# Patient Record
Sex: FEMALE | Race: WHITE | NOT HISPANIC OR LATINO | Employment: OTHER | URBAN - METROPOLITAN AREA
[De-identification: names, ages, dates, MRNs, and addresses within clinical notes are randomized per-mention and may not be internally consistent; named-entity substitution may affect disease eponyms.]

---

## 2017-12-04 ENCOUNTER — OFFICE VISIT (OUTPATIENT)
Dept: MEDICAL GROUP | Facility: MEDICAL CENTER | Age: 62
End: 2017-12-04
Attending: NURSE PRACTITIONER
Payer: MEDICAID

## 2017-12-04 VITALS
TEMPERATURE: 98.3 F | HEART RATE: 68 BPM | SYSTOLIC BLOOD PRESSURE: 102 MMHG | BODY MASS INDEX: 20.83 KG/M2 | DIASTOLIC BLOOD PRESSURE: 60 MMHG | RESPIRATION RATE: 16 BRPM | WEIGHT: 125 LBS | OXYGEN SATURATION: 97 % | HEIGHT: 65 IN

## 2017-12-04 DIAGNOSIS — Z13.1 SCREENING FOR DIABETES MELLITUS: ICD-10-CM

## 2017-12-04 DIAGNOSIS — G40.909 SEIZURE DISORDER (HCC): ICD-10-CM

## 2017-12-04 DIAGNOSIS — Z13.220 SCREENING CHOLESTEROL LEVEL: ICD-10-CM

## 2017-12-04 DIAGNOSIS — E03.9 ACQUIRED HYPOTHYROIDISM: ICD-10-CM

## 2017-12-04 DIAGNOSIS — Z12.39 SCREENING FOR BREAST CANCER: ICD-10-CM

## 2017-12-04 DIAGNOSIS — Z12.11 SCREEN FOR COLON CANCER: ICD-10-CM

## 2017-12-04 DIAGNOSIS — G25.81 RESTLESS LEG SYNDROME: ICD-10-CM

## 2017-12-04 DIAGNOSIS — Z13.21 ENCOUNTER FOR VITAMIN DEFICIENCY SCREENING: ICD-10-CM

## 2017-12-04 DIAGNOSIS — Z13.0 SCREENING, IRON DEFICIENCY ANEMIA: ICD-10-CM

## 2017-12-04 DIAGNOSIS — M19.90 ARTHRITIS: ICD-10-CM

## 2017-12-04 DIAGNOSIS — Z00.00 ROUTINE HEALTH MAINTENANCE: ICD-10-CM

## 2017-12-04 DIAGNOSIS — Z13.29 SCREENING FOR THYROID DISORDER: ICD-10-CM

## 2017-12-04 PROCEDURE — 99204 OFFICE O/P NEW MOD 45 MIN: CPT | Performed by: NURSE PRACTITIONER

## 2017-12-04 PROCEDURE — 99203 OFFICE O/P NEW LOW 30 MIN: CPT | Performed by: NURSE PRACTITIONER

## 2017-12-04 RX ORDER — LEVOTHYROXINE SODIUM 0.05 MG/1
50 TABLET ORAL
COMMUNITY
End: 2017-12-04 | Stop reason: SDUPTHER

## 2017-12-04 RX ORDER — BUMETANIDE 1 MG/1
1 TABLET ORAL DAILY
COMMUNITY
End: 2017-12-04 | Stop reason: SDUPTHER

## 2017-12-04 RX ORDER — BUMETANIDE 1 MG/1
1 TABLET ORAL DAILY
Qty: 30 TAB | Refills: 2 | Status: SHIPPED | OUTPATIENT
Start: 2017-12-04 | End: 2023-01-01

## 2017-12-04 RX ORDER — CLONAZEPAM 1 MG/1
1 TABLET ORAL 2 TIMES DAILY
Qty: 30 TAB | Refills: 0 | Status: SHIPPED | OUTPATIENT
Start: 2017-12-04 | End: 2018-05-09

## 2017-12-04 RX ORDER — LEVOTHYROXINE SODIUM 0.05 MG/1
50 TABLET ORAL
Qty: 30 TAB | Refills: 2 | Status: SHIPPED | OUTPATIENT
Start: 2017-12-04 | End: 2018-01-02 | Stop reason: SDUPTHER

## 2017-12-04 RX ORDER — PRAMIPEXOLE DIHYDROCHLORIDE 1 MG/1
1 TABLET ORAL 2 TIMES DAILY
COMMUNITY
End: 2017-12-04 | Stop reason: SDUPTHER

## 2017-12-04 RX ORDER — CLONAZEPAM 1 MG/1
0.5 TABLET ORAL
COMMUNITY
End: 2018-05-09

## 2017-12-04 RX ORDER — IBUPROFEN 600 MG/1
600 TABLET ORAL EVERY 6 HOURS PRN
COMMUNITY
End: 2017-12-04 | Stop reason: SDUPTHER

## 2017-12-04 RX ORDER — PRAMIPEXOLE DIHYDROCHLORIDE 1 MG/1
1 TABLET ORAL 2 TIMES DAILY
Qty: 60 TAB | Refills: 2 | Status: SHIPPED | OUTPATIENT
Start: 2017-12-04 | End: 2018-03-02 | Stop reason: SDUPTHER

## 2017-12-04 RX ORDER — LEVETIRACETAM 500 MG/1
500 TABLET ORAL 2 TIMES DAILY
COMMUNITY
End: 2017-12-04 | Stop reason: SDUPTHER

## 2017-12-04 RX ORDER — LEVETIRACETAM 500 MG/1
500 TABLET ORAL 2 TIMES DAILY
Qty: 60 TAB | Refills: 2 | Status: SHIPPED | OUTPATIENT
Start: 2017-12-04 | End: 2018-03-02 | Stop reason: SDUPTHER

## 2017-12-04 RX ORDER — IBUPROFEN 600 MG/1
600 TABLET ORAL EVERY 6 HOURS PRN
Qty: 30 TAB | Refills: 2 | Status: SHIPPED | OUTPATIENT
Start: 2017-12-04 | End: 2019-03-11

## 2017-12-04 ASSESSMENT — PATIENT HEALTH QUESTIONNAIRE - PHQ9: CLINICAL INTERPRETATION OF PHQ2 SCORE: 0

## 2017-12-05 NOTE — ASSESSMENT & PLAN NOTE
"Hx of RLS 2008.  States legs move a lot in bed at night.  Pt reports over time \"like electricity going down the legs\"  Taking Klonopin at night and Mirapex at night.  Controls the RLS she reports.  We discussed that I would prefer to wean her off the Clonazepam as she   Also uses marijuana frequently and have discussed need for   Controlled Substance agreement and UDS at next visit  If Pt is to continue Clonazepam at hs.  Pt understands.  "

## 2017-12-05 NOTE — ASSESSMENT & PLAN NOTE
Partial thyroidectomy due to nodule on thyroid.  Benign thyroid nodule removed 1994.  Has been our of her Synthroid 50 mcg/day for about 1 week.  States feels slightly less energy but otherwise okay.  We discussed re-starting and getting labs a few days before next appt  In ~ 4 weeks, Pt agrees.

## 2017-12-05 NOTE — ASSESSMENT & PLAN NOTE
Pt reportst Aug 2013 in [place]. Had Sz with noknown hx.  States Petit mal Seizures. Started on Keppra in 2013.  Unsure if had Sz in middle of night at one time but other than that not sure.  Denies known head trauma or drug use except Marijuana.

## 2017-12-05 NOTE — PROGRESS NOTES
"    Chief Complaint: New Patient, Med Refills    HPI:  Malaika presents to the clinic as New Patient.    Moved from [facility] to [place] recently    Her PMH includes  Arthritis  Hypothyroid  Partial Thyroidectomy  Seizure Disorder as adult  Restless Leg Syndrome (RLS)  Tobacco use  Marijuana use    [place]  Report   No Results    Arthritis  Pt reports 1992 joint pains.   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  States her hands have nodules  Worried she may have rheumatoid arthritis.    Acquired hypothyroidism  Partial thyroidectomy due to nodule on thyroid.  Benign thyroid nodule removed 1994.  Has been our of her Synthroid 50 mcg/day for about 1 week.  States feels slightly less energy but otherwise okay.  We discussed re-starting and getting labs a few days before next appt  In ~ 4 weeks, Pt agrees.    Seizure disorder (CMS-HCC)  Pt reportst Aug 2013 in [place]. Had Sz with noknown hx.  States Petit mal Seizures. Started on Keppra in 2013.  Unsure if had Sz in middle of night at one time but other than that not sure.  Denies known head trauma or drug use except Marijuana.    Restless leg syndrome  Hx of RLS 2008.  States legs move a lot in bed at night.  Pt reports over time \"like electricity going down the legs\"  Taking Klonopin at night and Mirapex at night.  Controls the RLS she reports.  We discussed that I would prefer to wean her off the Clonazepam as she   Also uses marijuana frequently and have discussed need for   Controlled Substance agreement and UDS at next visit  If Pt is to continue Clonazepam at hs.  Pt understands.      Patient Active Problem List    Diagnosis Date Noted   • Seizure disorder (CMS-HCC) 12/04/2017   • Acquired hypothyroidism 12/04/2017   • Restless leg syndrome 12/04/2017   • Arthritis 12/04/2017       Allergies:Sulfa drugs    Current Outpatient Prescriptions   Medication Sig Dispense Refill   • clonazepam (KLONOPIN) 1 MG Tab Take 0.5 mg by mouth every bedtime.     • pramipexole (MIRAPEX) 1 MG Tab " "Take 1 Tab by mouth 2 Times a Day. 60 Tab 2   • levetiracetam (KEPPRA) 500 MG Tab Take 1 Tab by mouth 2 times a day. 60 Tab 2   • bumetanide (BUMEX) 1 MG Tab Take 1 Tab by mouth every day. 30 Tab 2   • levothyroxine (SYNTHROID) 50 MCG Tab Take 1 Tab by mouth Every morning on an empty stomach. 30 Tab 2   • ibuprofen (MOTRIN) 600 MG Tab Take 1 Tab by mouth every 6 hours as needed. 30 Tab 2   • clonazepam (KLONOPIN) 1 MG Tab Take 1 Tab by mouth 2 times a day. 30 Tab 0     No current facility-administered medications for this visit.        Social History   Substance Use Topics   • Smoking status: Current Every Day Smoker     Packs/day: 0.25     Years: 15.00     Types: Cigarettes   • Smokeless tobacco: Never Used   • Alcohol use Yes      Comment: about drinks, wine or mixed drink        Family History   Problem Relation Age of Onset   • Stroke Mother    • Cancer Mother    • Cancer Maternal Aunt    • Stroke Maternal Grandmother    • Cancer Paternal Grandfather        ROS:  Review of Systems   See HPI Above    Exam:  Blood pressure 102/60, pulse 68, temperature 36.8 °C (98.3 °F), resp. rate 16, height 1.638 m (5' 4.5\"), weight 56.7 kg (125 lb), SpO2 97 %.  General:  Well nourished, well developed female in NAD  HENT:Head is grossly normal. PERRL.  Neck: Supple. Trachea is midline.  Pulmonary: Clear to ausculation .  Normal effort. No rales, ronchi, or wheezing.   Cardiovascular: Regular rate and rhythm.  Abdomen-Abdomen is soft, No tenderness.  Upper extremities- WEAK = . Good ROM, fingers with some joint deformity/enlargement.  Lower extremities- neg for edema, redness, tenderness.  Neuro- A & O x 4. Speech clear and appropriate.     Current medications, allergies, and problem list reviewed with patient and updated in  [facility] today.    Assessment/Plan:  1. Screening, iron deficiency anemia  CBC WITH DIFFERENTIAL    IRON/TOTAL IRON BIND    FERRITIN   2. Screening for diabetes mellitus  COMP METABOLIC PANEL    HEMOGLOBIN " A1C   3. Screening for thyroid disorder  TSH   4. Encounter for vitamin deficiency screening  VITAMIN D,25 HYDROXY    VITAMIN B12   5. Screening cholesterol level  LIPID PROFILE   6. Screen for colon cancer  OCCULT BLOOD FECES IMMUNOASSAY (FIT)   7. Screening for breast cancer  MA-SCREEN MAMMO W/CAD-BILAT   8. Seizure disorder (CMS-HCC)  LEVETIRACETAM (KEPPRA), S    REFERRAL TO NEUROLOGY    levetiracetam (KEPPRA) 500 MG Tab   9. Acquired hypothyroidism  levothyroxine (SYNTHROID) 50 MCG Tab   10. Restless leg syndrome  pramipexole (MIRAPEX) 1 MG Tab    bumetanide (BUMEX) 1 MG Tab    clonazepam (KLONOPIN) 1 MG Tab at hs  If need refill next month Pt will need to give Urine and provide Urine for UDS. Pt instructed about over use of alcohol or marijuana not allowed on our agreement nor is it safe with this medication.   11. Arthritis  RHEUMATOID ARTHRITIS FACTOR    ibuprofen (MOTRIN) 600 MG Tab   12. Routine health maintenance  HEPATITIS PANEL ACUTE(4 COMPONENTS)    HIV ANTIBODIES   Follow up in 4 weeks. Call or return if questions, concerns, or worsening condition.

## 2017-12-05 NOTE — ASSESSMENT & PLAN NOTE
Pt reports 1992 joint pains.   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  States her hands have nodules  Worried she may have rheumatoid arthritis.

## 2017-12-22 LAB — HBA1C MFR BLD: 5.2 % (ref ?–5.8)

## 2018-01-02 ENCOUNTER — OFFICE VISIT (OUTPATIENT)
Dept: MEDICAL GROUP | Facility: MEDICAL CENTER | Age: 63
End: 2018-01-02
Attending: NURSE PRACTITIONER
Payer: MEDICAID

## 2018-01-02 VITALS
WEIGHT: 131.8 LBS | BODY MASS INDEX: 21.18 KG/M2 | HEART RATE: 88 BPM | OXYGEN SATURATION: 98 % | HEIGHT: 66 IN | SYSTOLIC BLOOD PRESSURE: 121 MMHG | DIASTOLIC BLOOD PRESSURE: 61 MMHG | RESPIRATION RATE: 17 BRPM | TEMPERATURE: 98.5 F

## 2018-01-02 DIAGNOSIS — G25.81 RESTLESS LEG SYNDROME: ICD-10-CM

## 2018-01-02 DIAGNOSIS — E03.9 ACQUIRED HYPOTHYROIDISM: ICD-10-CM

## 2018-01-02 DIAGNOSIS — G40.909 SEIZURE DISORDER (HCC): ICD-10-CM

## 2018-01-02 DIAGNOSIS — G89.4 CHRONIC PAIN SYNDROME: ICD-10-CM

## 2018-01-02 DIAGNOSIS — M19.90 ARTHRITIS: ICD-10-CM

## 2018-01-02 DIAGNOSIS — R05.9 COUGH: ICD-10-CM

## 2018-01-02 PROCEDURE — 99213 OFFICE O/P EST LOW 20 MIN: CPT | Performed by: NURSE PRACTITIONER

## 2018-01-02 PROCEDURE — 99214 OFFICE O/P EST MOD 30 MIN: CPT | Performed by: NURSE PRACTITIONER

## 2018-01-02 RX ORDER — BENZONATATE 100 MG/1
100 CAPSULE ORAL 3 TIMES DAILY PRN
Qty: 30 CAP | Refills: 0 | Status: SHIPPED | OUTPATIENT
Start: 2018-01-02 | End: 2018-05-09

## 2018-01-02 RX ORDER — CODEINE PHOSPHATE/GUAIFENESIN 10-100MG/5
5 LIQUID (ML) ORAL 3 TIMES DAILY PRN
Qty: 120 ML | Refills: 0 | Status: SHIPPED | OUTPATIENT
Start: 2018-01-02 | End: 2018-01-09

## 2018-01-02 RX ORDER — CYCLOBENZAPRINE HCL 10 MG
10 TABLET ORAL NIGHTLY PRN
Qty: 30 TAB | Refills: 1 | Status: SHIPPED | OUTPATIENT
Start: 2018-01-02 | End: 2018-05-09

## 2018-01-02 RX ORDER — LEVOTHYROXINE SODIUM 0.05 MG/1
50 TABLET ORAL
Qty: 30 TAB | Refills: 2 | Status: SHIPPED | OUTPATIENT
Start: 2018-01-02 | End: 2018-06-22 | Stop reason: SDUPTHER

## 2018-01-02 RX ORDER — GABAPENTIN 100 MG/1
100 CAPSULE ORAL 3 TIMES DAILY
Qty: 90 CAP | Refills: 2 | Status: SHIPPED | OUTPATIENT
Start: 2018-01-02 | End: 2018-04-02 | Stop reason: SDUPTHER

## 2018-01-02 RX ORDER — CLONAZEPAM 0.5 MG/1
0.5 TABLET ORAL NIGHTLY PRN
Qty: 30 TAB | Refills: 0 | Status: SHIPPED | OUTPATIENT
Start: 2018-01-02 | End: 2018-02-01

## 2018-01-02 NOTE — ASSESSMENT & PLAN NOTE
Pt reports has been doing well but Mirapex is helping.   States has been on Klonopin 1 mg at hs for years.  I discussed tapering her off Klonopin and trying addition of Muscle Relaxant at hs.  Is not iron deficient per labs.  Also if needs ongoing Klonopin at next visit I instructed her we would for sure  Need to do Controlled Substance Agreement and obtain Urine for UDS prior to  Any more Klonopin RX's

## 2018-01-02 NOTE — ASSESSMENT & PLAN NOTE
We discussed her slightly low Keppra level.   Pt denies any recent Sz's. She reports that she did get a call  From neurology and was told Dr Kirkland would review her chart and they would call in early Jan 2018 to schedule an appt.

## 2018-01-02 NOTE — PROGRESS NOTES
"Malaika presents to the clinic for cough, body aches, nasal congestion, diarrhea x 1 week, and results    Her PMH includes:    Arthritis  Hypothyroid  Partial Thyroidectomy  Seizure Disorder as adult  Restless Leg Syndrome (RLS)  Tobacco use  Marijuana use     [place]  Report   Clonazepam 1 mg # 30 prescribed by me.      Review of Records shows:  12/4/17 Clinic New Patient appt and Med Refills. Labs ordered, Refer to Neurology for Seizures and Restless legs.  RX for Clonazepam for restless legs along with Requip.    Results Review:  12/22/17 [facility]- CBC normal, CMp normal except BS= 107, BUN= 30 GFR normal.                                      TSh= 0.50, Iron Levels okay, Keppra level = 2.1, Vit D= 60, Vit B12 = 1,319.                                       Hepatitis and HIV tests all negative.    Chief Complaint:    HPI:      Cough  Cough, body aches, nasal congestion and diarrhea for about a week.  States coughing up clear phlegm. Did have some chills.   States diarrhea is better. Body aches better but tired now.  Denies SOB now.     Acquired hypothyroidism  WE discussed her TSH and is taking 50 mcg/day.  Denies palpitations or wt loss or gain.    Restless leg syndrome  Pt reports has been doing well but Mirapex is helping.   States has been on Klonopin 1 mg at hs for years.  I discussed tapering her off Klonopin and trying addition of Muscle Relaxant at hs.  Is not iron deficient per labs.  Also if needs ongoing Klonopin at next visit I instructed her we would for sure  Need to do Controlled Substance Agreement and obtain Urine for UDS prior to  Any more Klonopin RX's      Chronic pain syndrome/\"Arthritis\"  Pt reports has chronic pain in hands, feet, hips and lower back.  We discussed that her Rheumatoid Arthritis test was negative.  Is interested in Pain Management  Is taking Motrin and helps slightly. I instructed her that I did not believe narcotics   Would be appropriate for her chronic pain and recommended "   Motrin and Gabapentin, but will refer to pain management.  IN past has used Gabapentin and it helped in past especially w legs cramps.    Seizure disorder (CMS-HCC)  We discussed her slightly low Keppra level.   Pt denies any recent Sz's. She reports that she did get a call  From neurology and was told Dr Kirkland would review her chart and they would call in early Jan 2018 to schedule an appt.        Patient Active Problem List    Diagnosis Date Noted   • Cough 01/02/2018   • Seizure disorder (CMS-HCC) 12/04/2017   • Acquired hypothyroidism 12/04/2017   • Restless leg syndrome 12/04/2017   • Chronic pain syndrome 12/04/2017       Allergies:Sulfa drugs    Current Outpatient Prescriptions   Medication Sig Dispense Refill   • benzonatate (TESSALON) 100 MG Cap Take 1 Cap by mouth 3 times a day as needed. 30 Cap 0   • guaifenesin-codeine (TUSSI-ORGANIDIN NR) 100-10 MG/5ML syrup Take 5 mL by mouth 3 times a day as needed for up to 7 days. 120 mL 0   • levothyroxine (SYNTHROID) 50 MCG Tab Take 1 Tab by mouth Every morning on an empty stomach. 30 Tab 2   • cyclobenzaprine (FLEXERIL) 10 MG Tab Take 1 Tab by mouth at bedtime as needed (to prevent restless legs). 30 Tab 1   • clonazepam (KLONOPIN) 0.5 MG Tab Take 1 Tab by mouth at bedtime as needed for up to 30 days. 30 Tab 0   • gabapentin (NEURONTIN) 100 MG Cap Take 1 Cap by mouth 3 times a day. 90 Cap 2   • clonazepam (KLONOPIN) 1 MG Tab Take 0.5 mg by mouth every bedtime.     • pramipexole (MIRAPEX) 1 MG Tab Take 1 Tab by mouth 2 Times a Day. 60 Tab 2   • levetiracetam (KEPPRA) 500 MG Tab Take 1 Tab by mouth 2 times a day. 60 Tab 2   • bumetanide (BUMEX) 1 MG Tab Take 1 Tab by mouth every day. 30 Tab 2   • ibuprofen (MOTRIN) 600 MG Tab Take 1 Tab by mouth every 6 hours as needed. 30 Tab 2   • clonazepam (KLONOPIN) 1 MG Tab Take 1 Tab by mouth 2 times a day. 30 Tab 0     No current facility-administered medications for this visit.        Social History   Substance Use  "Topics   • Smoking status: Current Every Day Smoker     Packs/day: 0.25     Years: 15.00     Types: Cigarettes   • Smokeless tobacco: Never Used   • Alcohol use Yes      Comment: about drinks, wine or mixed drink        Family History   Problem Relation Age of Onset   • Stroke Mother    • Cancer Mother    • Cancer Maternal Aunt    • Stroke Maternal Grandmother    • Cancer Paternal Grandfather        ROS:  Review of Systems   See HPI Above        Exam:  Blood pressure 121/61, pulse 88, temperature 36.9 °C (98.5 °F), resp. rate 17, height 1.676 m (5' 6\"), weight 59.8 kg (131 lb 12.8 oz), SpO2 98 %.  General:  Well nourished, well developed female in NAD  HENT:Head is grossly normal. PERRL.  Neck: Supple. Trachea is midline.  Pulmonary: Clear to ausculation .  Normal effort. No rales, ronchi, or wheezing.   Cardiovascular: Regular rate and rhythm.  Abdomen-Abdomen is soft, No tenderness.  Upper extremities- Strong = . Good ROM  Lower extremities- neg for edema, redness, tenderness.  Neuro- A & O x 4. Speech clear and appropriate.     Current medications, allergies, and problem list reviewed with patient and updated in  [facility] today.    Assessment/Plan:  1. Cough  benzonatate (TESSALON) 100 MG Cap    guaifenesin-codeine (TUSSI-ORGANIDIN NR) 100-10 MG/5ML syrup   2. Acquired hypothyroidism  levothyroxine (SYNTHROID) 50 MCG Tab-refill   3. Restless leg syndrome  cyclobenzaprine (FLEXERIL) 10 MG Tab at hs.  Continue Mirapex    clonazepam (KLONOPIN) 0.5 MG Tab-Reduction in dose to taper off.  If not able, then at next visit will need UDS and To agree and Sign Controlled Substance Agreement.   4. Arthritis  REFERRAL TO PAIN CLINIC    gabapentin (NEURONTIN) 100 MG Cap-START   5. Chronic pain syndrome  Refer To Pain Clinic, Gabapentin, Motrin   6. Seizure disorder (CMS-HCC)  Continue [facility].   Follow up in 1 month. Call or return if questions, concerns, or worsening condition.      "

## 2018-01-02 NOTE — ASSESSMENT & PLAN NOTE
Pt reports has chronic pain in hands, feet, hips and lower back.  We discussed that her Rheumatoid Arthritis test was negative.  Is interested in Pain Management  Is taking Motrin and helps slightly. I instructed her that I did not believe narcotics   Would be appropriate for her chronic pain and recommended   Motrin and Gabapentin, but will refer to pain management.  IN past has used Gabapentin and it helped in past especially w legs cramps.

## 2018-01-02 NOTE — ASSESSMENT & PLAN NOTE
Cough, body aches, nasal congestion and diarrhea for about a week.  States coughing up clear phlegm. Did have some chills.   States diarrhea is better. Body aches better but tired now.  Denies SOB now.

## 2018-01-03 ENCOUNTER — HOSPITAL ENCOUNTER (OUTPATIENT)
Dept: RADIOLOGY | Facility: MEDICAL CENTER | Age: 63
End: 2018-01-03
Attending: NURSE PRACTITIONER
Payer: MEDICAID

## 2018-01-03 DIAGNOSIS — Z12.39 SCREENING FOR BREAST CANCER: ICD-10-CM

## 2018-01-03 PROCEDURE — 77067 SCR MAMMO BI INCL CAD: CPT

## 2018-02-06 ENCOUNTER — OFFICE VISIT (OUTPATIENT)
Dept: MEDICAL GROUP | Facility: MEDICAL CENTER | Age: 63
End: 2018-02-06
Attending: NURSE PRACTITIONER
Payer: MEDICARE

## 2018-02-06 VITALS
OXYGEN SATURATION: 98 % | HEIGHT: 66 IN | RESPIRATION RATE: 16 BRPM | BODY MASS INDEX: 20.73 KG/M2 | WEIGHT: 129 LBS | DIASTOLIC BLOOD PRESSURE: 70 MMHG | HEART RATE: 80 BPM | TEMPERATURE: 97.8 F | SYSTOLIC BLOOD PRESSURE: 120 MMHG

## 2018-02-06 DIAGNOSIS — Z12.11 SCREEN FOR COLON CANCER: ICD-10-CM

## 2018-02-06 DIAGNOSIS — G89.4 CHRONIC PAIN SYNDROME: ICD-10-CM

## 2018-02-06 DIAGNOSIS — G47.9 SLEEP DIFFICULTIES: ICD-10-CM

## 2018-02-06 DIAGNOSIS — G25.81 RESTLESS LEG SYNDROME: ICD-10-CM

## 2018-02-06 DIAGNOSIS — M79.674 PAIN OF TOE OF RIGHT FOOT: ICD-10-CM

## 2018-02-06 DIAGNOSIS — E03.9 ACQUIRED HYPOTHYROIDISM: ICD-10-CM

## 2018-02-06 PROCEDURE — 99213 OFFICE O/P EST LOW 20 MIN: CPT | Performed by: NURSE PRACTITIONER

## 2018-02-06 PROCEDURE — 99214 OFFICE O/P EST MOD 30 MIN: CPT | Performed by: NURSE PRACTITIONER

## 2018-02-06 RX ORDER — TRAZODONE HYDROCHLORIDE 50 MG/1
50 TABLET ORAL NIGHTLY PRN
Qty: 30 TAB | Refills: 1 | Status: SHIPPED | OUTPATIENT
Start: 2018-02-06 | End: 2018-06-22 | Stop reason: SDUPTHER

## 2018-02-06 RX ORDER — HYDROXYZINE HYDROCHLORIDE 25 MG/1
25 TABLET, FILM COATED ORAL NIGHTLY PRN
Qty: 30 TAB | Refills: 1 | Status: SHIPPED | OUTPATIENT
Start: 2018-02-06 | End: 2018-11-27

## 2018-02-06 NOTE — ASSESSMENT & PLAN NOTE
Hx of chronic pain to hands, fee, hips and lower back, Neg Rh Factor blood test.  Previously referred on 1/2/18 to Pain Management, but on hold to process as requires   MRI Imaging prior to approval of Referral.   Pt reports

## 2018-02-06 NOTE — PROGRESS NOTES
Malaika presents to the clinic for f/u on Results, Chronic Pain, REstless legs    Her PMH includes:  Arthritis  Chronic Pain ( Hands, Feet, Low Back, Hips)  Hypothyroid  Partial Thyroidectomy  Seizure Disorder as adult  Restless Leg Syndrome (RLS)  Tobacco use  Marijuana use     [place]  Report   1/10/18 Robitussin w Codeine cough syrup 120 cc by me  1/2/18 Clonazepam 0.5 mg # 30 by me  12/4/17 Clonazepam 1 mg # 30 prescribed by me.       Referrals Approved:  Neurology- Dr Kirkland  Pain Management pending MRI, as will not process without this imaging.     Review of Records shows:  1/2/18 Clinc Appt for cough, Body aches, RX Tessalon, Robitussin w codeine, Klonopin and to taper down and off.  Refer to Pain Clinic, To start RX Gabapentin. To Call Neurology for appt r/t Seizure.  12/4/17 Clinic New Patient appt and Med Refills. Labs ordered, Refer to Neurology for Seizures and Restless legs.  RX for Clonazepam for restless legs along with Requip.     Results Review:  12/22/17 [facility]- CBC normal, CMp normal except BS= 107, BUN= 30 GFR normal.                                      TSh= 0.50, Iron Levels okay, Keppra level = 2.1, Vit D= 60, Vit B12 = 1,319.                                       Hepatitis and HIV tests all negative. A1c= 5.2    1/3/18 Mammogram Results - no signs of malignancy, f/u 1 yr for Mammogram.    Chief Complaint:    HPI:      Chronic pain syndrome  Hx of chronic pain to hands, fee, hips and lower back, Neg Rh Factor blood test.  Previously referred on 1/2/18 to Pain Management, but on hold to process as requires   MRI Imaging prior to approval of Referral.   Pt reports is taking Gabapentin and Motrin. Reports pain is tolerable.      Acquired hypothyroidism  Last TSH = 0.50 on 12/22/17 while on Levothyroxine 50 mcg/day.  She continues to take this amt. Denies fatigue or palpitations.        Restless leg syndrome  At last visit we discussed tapering of Klonopin and relying on Mirapex and adding Muscle  RElaxant( Flexeril) at bed time.  Pt reports Flexeril along w Mirapex is not working well enough for her RLS and she is having difficulty sleeping.  Discussed options possible - Atarax or Zanaflex but Pt asking for Clonazepam.    Pain of toe of right foot  Pt reports right 2nd toe pain.  Started as blister and then became larger.  Appears to be a corn.  Discussed tx w Liq Nitrogen and then polysporin and cotton to keep pressure or friction off.      Sleep difficulties  Pt reports she is having difficulty sleeping.  She reports flexeril is not helping.  Discussed not starting back on Clonazepam, but trial of   Atarax and if not helpful, trial of trazodone.  If unsuccessful, Pt to return and discuss other options.        Patient Active Problem List    Diagnosis Date Noted   • Pain of toe of right foot 02/06/2018   • Sleep difficulties 02/06/2018   • Cough 01/02/2018   • Seizure disorder (CMS-HCC) 12/04/2017   • Acquired hypothyroidism 12/04/2017   • Restless leg syndrome 12/04/2017   • Chronic pain syndrome 12/04/2017       Allergies:Sulfa drugs    Current Outpatient Prescriptions   Medication Sig Dispense Refill   • hydrOXYzine HCl (ATARAX) 25 MG Tab Take 1 Tab by mouth at bedtime as needed (for insomnia and restless legs). 30 Tab 1   • traZODone (DESYREL) 50 MG Tab Take 1 Tab by mouth at bedtime as needed for Sleep. 30 Tab 1   • levothyroxine (SYNTHROID) 50 MCG Tab Take 1 Tab by mouth Every morning on an empty stomach. 30 Tab 2   • cyclobenzaprine (FLEXERIL) 10 MG Tab Take 1 Tab by mouth at bedtime as needed (to prevent restless legs). 30 Tab 1   • gabapentin (NEURONTIN) 100 MG Cap Take 1 Cap by mouth 3 times a day. 90 Cap 2   • pramipexole (MIRAPEX) 1 MG Tab Take 1 Tab by mouth 2 Times a Day. 60 Tab 2   • levetiracetam (KEPPRA) 500 MG Tab Take 1 Tab by mouth 2 times a day. 60 Tab 2   • ibuprofen (MOTRIN) 600 MG Tab Take 1 Tab by mouth every 6 hours as needed. 30 Tab 2   • benzonatate (TESSALON) 100 MG Cap Take 1  "Cap by mouth 3 times a day as needed. 30 Cap 0   • clonazepam (KLONOPIN) 1 MG Tab Take 0.5 mg by mouth every bedtime.     • bumetanide (BUMEX) 1 MG Tab Take 1 Tab by mouth every day. 30 Tab 2   • clonazepam (KLONOPIN) 1 MG Tab Take 1 Tab by mouth 2 times a day. 30 Tab 0     No current facility-administered medications for this visit.        Social History   Substance Use Topics   • Smoking status: Current Every Day Smoker     Packs/day: 0.25     Years: 15.00     Types: Cigarettes   • Smokeless tobacco: Never Used   • Alcohol use Yes      Comment: about drinks, wine or mixed drink        Family History   Problem Relation Age of Onset   • Stroke Mother    • Cancer Mother    • Cancer Maternal Aunt    • Stroke Maternal Grandmother    • Cancer Paternal Grandfather        ROS:  Review of Systems   See HPI Above        Exam:  Blood pressure 120/70, pulse 80, temperature 36.6 °C (97.8 °F), resp. rate 16, height 1.676 m (5' 5.98\"), weight 58.5 kg (129 lb), SpO2 98 %.  General:  Well nourished, well developed female in NAD  HENT:Head is grossly normal. PERRL.  Neck: Supple. Trachea is midline.  Pulmonary: Clear to ausculation .  Normal effort. No rales, ronchi, or wheezing.   Cardiovascular: Regular rate and rhythm.  Abdomen-Abdomen is soft, No tenderness.  Upper extremities- Strong = . Good ROM  Lower extremities- neg for edema, redness, tenderness.  Neuro- A & O x 4. Speech clear and appropriate.     Current medications, allergies, and problem list reviewed with patient and updated in  [facility] today.    Assessment/Plan:  1. Chronic pain syndrome  Continue Motrin and Gabapentin.    2. Acquired hypothyroidism  Continue Synthroid 50 mcg/day   3. Screen for colon cancer  OCCULT BLOOD FECES IMMUNOASSAY (FIT)   4. Restless leg syndrome  Continue Mirapex, Stop Flexeril as not helping.   5. Pain of toe of right foot  Procedure: liquid Nitrogen to Corn/callous until blanched white x 2, polysporin applied, and non -adherent gauze " between adjacent toe to decrease friction. Pt tolerated well.  Wound care reviewed.   6. Sleep difficulties  hydrOXYzine HCl (ATARAX) 25 MG Tab Trial    traZODone (DESYREL) 50 MG Tab Trial   Patient given Contact info for Neurology-Dr Kirkland r/t seizures and instructed to call again for appt.  Follow up in 3 months. Call or return if questions, concerns, or worsening condition.

## 2018-02-06 NOTE — ASSESSMENT & PLAN NOTE
Last TSH = 0.50 on 12/22/17 while on Levothyroxine 50 mcg/day.  She continues to take this amt.

## 2018-02-06 NOTE — ASSESSMENT & PLAN NOTE
At last visit we discussed tapering of Klonopin and relying on Mirapex and adding Muscle RElaxant( Flexeril) at bed time.  Pt reports Flexeril along w Mirapex is not working well enough for her RLS and she is having difficulty sleeping.  Discussed options possible - Atarax or Zanaflex but Pt asking for Clonazepam.

## 2018-02-06 NOTE — ASSESSMENT & PLAN NOTE
Pt reports right 2nd toe pain.  Started as blister and then became larger.  Appears to be a corn.  Discussed tx w Liq Nitrogen and then polysporin and cotton to keep pressure or friction off.

## 2018-03-02 DIAGNOSIS — G40.909 SEIZURE DISORDER (HCC): ICD-10-CM

## 2018-03-02 DIAGNOSIS — G25.81 RESTLESS LEG SYNDROME: ICD-10-CM

## 2018-03-05 RX ORDER — LEVETIRACETAM 500 MG/1
500 TABLET ORAL 2 TIMES DAILY
Qty: 60 TAB | Refills: 2 | Status: SHIPPED | OUTPATIENT
Start: 2018-03-05 | End: 2018-05-09 | Stop reason: SDUPTHER

## 2018-03-05 RX ORDER — PRAMIPEXOLE DIHYDROCHLORIDE 1 MG/1
1 TABLET ORAL 2 TIMES DAILY
Qty: 60 TAB | Refills: 2 | Status: SHIPPED | OUTPATIENT
Start: 2018-03-05 | End: 2018-06-01 | Stop reason: SDUPTHER

## 2018-04-02 DIAGNOSIS — M19.90 ARTHRITIS: ICD-10-CM

## 2018-04-02 RX ORDER — GABAPENTIN 100 MG/1
100 CAPSULE ORAL 3 TIMES DAILY
Qty: 90 CAP | Refills: 2 | Status: SHIPPED | OUTPATIENT
Start: 2018-04-02 | End: 2018-07-02 | Stop reason: SDUPTHER

## 2018-05-09 ENCOUNTER — OFFICE VISIT (OUTPATIENT)
Dept: MEDICAL GROUP | Facility: MEDICAL CENTER | Age: 63
End: 2018-05-09
Payer: MEDICARE

## 2018-05-09 VITALS
SYSTOLIC BLOOD PRESSURE: 118 MMHG | RESPIRATION RATE: 16 BRPM | HEIGHT: 65 IN | HEART RATE: 76 BPM | TEMPERATURE: 97.5 F | BODY MASS INDEX: 23.49 KG/M2 | WEIGHT: 141 LBS | OXYGEN SATURATION: 97 % | DIASTOLIC BLOOD PRESSURE: 72 MMHG

## 2018-05-09 DIAGNOSIS — Z13.220 SCREENING CHOLESTEROL LEVEL: ICD-10-CM

## 2018-05-09 DIAGNOSIS — G25.81 RESTLESS LEG SYNDROME: ICD-10-CM

## 2018-05-09 DIAGNOSIS — E03.9 ACQUIRED HYPOTHYROIDISM: ICD-10-CM

## 2018-05-09 DIAGNOSIS — G40.909 SEIZURE DISORDER (HCC): ICD-10-CM

## 2018-05-09 DIAGNOSIS — Z00.00 MEDICARE ANNUAL WELLNESS VISIT, SUBSEQUENT: ICD-10-CM

## 2018-05-09 DIAGNOSIS — G89.29 CHRONIC PAIN OF LEFT KNEE: ICD-10-CM

## 2018-05-09 DIAGNOSIS — M25.50 ARTHRALGIA OF MULTIPLE JOINTS: ICD-10-CM

## 2018-05-09 DIAGNOSIS — Z78.0 MENOPAUSE: ICD-10-CM

## 2018-05-09 DIAGNOSIS — G89.4 CHRONIC PAIN SYNDROME: ICD-10-CM

## 2018-05-09 DIAGNOSIS — Z12.11 SCREEN FOR COLON CANCER: ICD-10-CM

## 2018-05-09 DIAGNOSIS — M25.562 CHRONIC PAIN OF LEFT KNEE: ICD-10-CM

## 2018-05-09 PROBLEM — R05.9 COUGH: Status: RESOLVED | Noted: 2018-01-02 | Resolved: 2018-05-09

## 2018-05-09 PROCEDURE — 99213 OFFICE O/P EST LOW 20 MIN: CPT | Mod: 25 | Performed by: NURSE PRACTITIONER

## 2018-05-09 PROCEDURE — G0438 PPPS, INITIAL VISIT: HCPCS | Performed by: NURSE PRACTITIONER

## 2018-05-09 RX ORDER — LEVETIRACETAM 500 MG/1
500 TABLET ORAL DAILY
Status: SHIPPED | DISCHARGE
Start: 2018-05-09 | End: 2018-08-27

## 2018-05-09 ASSESSMENT — PATIENT HEALTH QUESTIONNAIRE - PHQ9: CLINICAL INTERPRETATION OF PHQ2 SCORE: 0

## 2018-05-09 ASSESSMENT — ACTIVITIES OF DAILY LIVING (ADL): BATHING_REQUIRES_ASSISTANCE: 0

## 2018-05-09 NOTE — PROGRESS NOTES
CC: This is a former patient of the [facility] with renown who switches over because of coming off Medicaid and changing the Medicare. She is here for her annual Medicare wellness visit as well as need of referrals for her seizures and joint pain.    HPI:   Malaika presents today with the following.    1. Medicare annual wellness visit, subsequent  Screening performed below.    2. Seizure disorder (HCC)  Patient reports history of seizures 4 years ago and has had no seizures since then although she is on once a day Keppra. She has not seen a neurologist in a while. She feels she no longer has seizure problems and would like to get her 's license back but needs to see neurology first.    3. Acquired hypothyroidism  Patient currently on levothyroxine 50 µg and states her TSH has been stable for years.    4. Restless leg syndrome  Patient currently on Mirapex for this and finds it helpful.    5. Chronic pain syndrome  Patient has history of chronic pain syndrome which she states is related to her osteoarthritis. She states it affects multiple joints but mostly her hands and knees. She has had previous workup for rheumatoid arthritis which was negative. She currently treats with ibuprofen, trazodone and Neurontin but does not find this is helpful in relieving her pain. She has been using medical marijuana with limited success. She states she did well with Norco in the past but has been off the medicine since she is not going to pain management.    6. Arthralgia of multiple joints  Patient reports her pain is worse in her hand joints bilaterally but she also has been having issues with her knees. She has never been to rheumatology because she does not have rheumatoid arthritis.    7. Menopause  Patient due for bone density scan.    8. Screen for colon cancer  Patient reports her last colonoscopy was over 10 years ago.    9. Chronic pain of left knee  Patient has multiple joint pain as described above  but most recently she has been having more pain than usual in her left knee. It is with bending and flexing the knee and mostly on the medial aspect. She has not noticed redness or swelling. It has not given out on her. Ibuprofen helps with pain.    10. Screening cholesterol level  Patient will be due for yearly screening.      Depression Screening    Little interest or pleasure in doing things?  0 - not at all  Feeling down, depressed , or hopeless? 0 - not at all  Patient Health Questionnaire Score: 0     If depressive symptoms identified deferred to follow up visit unless specifically addressed in assessment and plan.    Interpretation of PHQ-9 Total Score   Score Severity   1-4 No Depression   5-9 Mild Depression   10-14 Moderate Depression   15-19 Moderately Severe Depression   20-27 Severe Depression    Screening for Cognitive Impairment    Three Minute Recall (apple, watch, denise)  3/3    Draw clock face with all 12 numbers set to the hand to show 10 minutes past 11 o'clock  1    Cognitive concerns identified deferred for follow up unless specifically addressed in assessment and plan.    Fall Risk Assessment    Has the patient had two or more falls in the last year or any fall with injury in the last year?  No    Safety Assessment    Throw rugs on floor.  Yes  Handrails on all stairs.  Yes  Good lighting in all hallways.  Yes  Difficulty hearing.     Patient counseled about all safety risks that were identified.    Functional Assessment ADLs    Are there any barriers preventing you from cooking for yourself or meeting nutritional needs?  No.    Are there any barriers preventing you from driving safely or obtaining transportation?  No.    Are there any barriers preventing you from using a telephone or calling for help?  No.    Are there any barriers preventing you from shopping?  No.    Are there any barriers preventing you from taking care of your own finances?  No.    Are there any barriers preventing you  from managing your medications?  No.    Are there any barriers preventing you from showering, bathing or dressing yourself?  No.    Are currently engaging any exercise or physical activity?  Yes.       Health Maintenance Summary                Annual Wellness Visit Overdue 1955     COLONOSCOPY Overdue 2/24/2003     BONE DENSITY Overdue 2/24/2018     IMM INFLUENZA Postponed 12/1/2018 Originally 9/1/2018. Patient Refused    IMM DTaP/Tdap/Td Vaccine Postponed 5/14/2021 Originally 2/24/1972. Patient Refused    IMM PNEUMOCOCCAL 65+ (ADULT) LOW/MEDIUM RISK SERIES Postponed 5/23/2023 Originally 2/24/2018. Patient Refused    MAMMOGRAM Next Due 1/3/2019      Done 1/3/2018 MA-SCREEN MAMMO W/CAD-BILAT          Patient Care Team:  SAVAGE Luna as PCP - General (Family Medicine)          Patient Active Problem List    Diagnosis Date Noted   • Arthralgia of multiple joints 05/09/2018   • Pain of toe of right foot 02/06/2018   • Sleep difficulties 02/06/2018   • Seizure disorder (HCC) 12/04/2017   • Acquired hypothyroidism 12/04/2017   • Restless leg syndrome 12/04/2017   • Chronic pain syndrome 12/04/2017       Current Outpatient Prescriptions   Medication Sig Dispense Refill   • Multiple Vitamins-Minerals (MULTIVITAMIN ADULT PO) Take  by mouth.     • Cholecalciferol (VITAMIN D3) 1000 units Cap Take  by mouth.     • levETIRAcetam (KEPPRA) 500 MG Tab Take 1 Tab by mouth every day.     • gabapentin (NEURONTIN) 100 MG Cap TAKE 1 CAP BY MOUTH 3 TIMES A DAY. 90 Cap 2   • pramipexole (MIRAPEX) 1 MG Tab TAKE 1 TAB BY MOUTH 2 TIMES A DAY. 60 Tab 2   • hydrOXYzine HCl (ATARAX) 25 MG Tab Take 1 Tab by mouth at bedtime as needed (for insomnia and restless legs). 30 Tab 1   • traZODone (DESYREL) 50 MG Tab Take 1 Tab by mouth at bedtime as needed for Sleep. 30 Tab 1   • levothyroxine (SYNTHROID) 50 MCG Tab Take 1 Tab by mouth Every morning on an empty stomach. 30 Tab 2   • bumetanide (BUMEX) 1 MG Tab Take 1 Tab by mouth every  "day. 30 Tab 2   • ibuprofen (MOTRIN) 600 MG Tab Take 1 Tab by mouth every 6 hours as needed. 30 Tab 2     No current facility-administered medications for this visit.          Allergies as of 05/09/2018 - Reviewed 05/09/2018   Allergen Reaction Noted   • Keflex  05/09/2018   • Sulfa drugs Hives 12/04/2017        ROS: As per HPI.    /72   Pulse 76   Temp 36.4 °C (97.5 °F)   Resp 16   Ht 1.651 m (5' 5\")   Wt 64 kg (141 lb)   SpO2 97%   BMI 23.46 kg/m²     Physical Exam:  Gen:         Alert and oriented, No apparent distress.  Neck:        No Lymphadenopathy or Bruits.  Lungs:     Clear to auscultation bilaterally  CV:          Regular rate and rhythm. No murmurs, rubs or gallops.  Abd:         Soft non tender, non distended. Normal active bowel sounds.  No  Hepatosplenomegaly, No pulsatile masses.                   Ext:          No clubbing, cyanosis, edema.  MS:           Joints of the hands bilaterally show swelling without calor but are tender to touch. Inspection of left knee reveals no edema or erythema with pain present on the medial aspect with flexion.      Assessment and Plan.   63 y.o. female with the following issues.    1. Medicare annual wellness visit, subsequent  Annual wellness topics discussed, review of chronic medical problems completed    - Initial Wellness Visit - Includes PPPS ()    2. Seizure disorder (HCC)  Patient is overdue for follow-up with neurology to see if she needs to continue on medication. She also would like to speak with them about getting her 's license back and states she has not had a seizure in 4 years.  - levETIRAcetam (KEPPRA) 500 MG Tab; Take 1 Tab by mouth every day.  - REFERRAL TO NEUROLOGY  - COMP METABOLIC PANEL; Future  - CBC WITHOUT DIFFERENTIAL; Future    3. Acquired hypothyroidism    - TSH; Future    4. Restless leg syndrome  Patient may continue on Mirapex which I will refill when due.    5. Chronic pain syndrome  Patient does not feel she is " getting adequate pain control with her gabapentin, trazodone, ibuprofen and marijuana and would like to be seen at a pain clinic now that she has insurance.  - REFERRAL TO PAIN CLINIC    6. Arthralgia of multiple joints  Patient's previous lab work apparently was negative for rheumatoid arthritis but she does appear to have osteoarthritis especially of her hands bilaterally. I will get x-rays and refer her to a pain clinic. She will continue with her current medicines.  - REFERRAL TO PAIN CLINIC  - DX-JOINT SURVEY-HANDS SINGLE VIEW; Future    7. Menopause    - DS-BONE DENSITY STUDY (DEXA); Future    8. Screen for colon cancer    - REFERRAL TO GI FOR COLONOSCOPY    9. Chronic pain of left knee  Patient will do x-ray and physical therapy and will also be following with pain management.  - DX-KNEE COMPLETE 4+ LEFT; Future  - REFERRAL TO PHYSICAL THERAPY Reason for Therapy: Eval/Treat/Report    10. Screening cholesterol level    - LIPID PROFILE; Future

## 2018-05-15 ENCOUNTER — HOSPITAL ENCOUNTER (OUTPATIENT)
Dept: RADIOLOGY | Facility: MEDICAL CENTER | Age: 63
End: 2018-05-15
Attending: NURSE PRACTITIONER
Payer: MEDICARE

## 2018-05-15 DIAGNOSIS — M25.562 CHRONIC PAIN OF LEFT KNEE: ICD-10-CM

## 2018-05-15 DIAGNOSIS — G89.29 CHRONIC PAIN OF LEFT KNEE: ICD-10-CM

## 2018-05-15 DIAGNOSIS — M25.50 ARTHRALGIA OF MULTIPLE JOINTS: ICD-10-CM

## 2018-05-15 PROCEDURE — 73564 X-RAY EXAM KNEE 4 OR MORE: CPT | Mod: LT

## 2018-05-15 PROCEDURE — 77077 JOINT SURVEY SINGLE VIEW: CPT

## 2018-05-22 ENCOUNTER — APPOINTMENT (OUTPATIENT)
Dept: RADIOLOGY | Facility: MEDICAL CENTER | Age: 63
End: 2018-05-22
Attending: NURSE PRACTITIONER
Payer: MEDICARE

## 2018-06-01 DIAGNOSIS — G25.81 RESTLESS LEG SYNDROME: ICD-10-CM

## 2018-06-01 DIAGNOSIS — G40.909 SEIZURE DISORDER (HCC): ICD-10-CM

## 2018-06-01 RX ORDER — LEVETIRACETAM 500 MG/1
500 TABLET ORAL 2 TIMES DAILY
Qty: 60 TAB | Refills: 2 | Status: SHIPPED | OUTPATIENT
Start: 2018-06-01 | End: 2018-11-27

## 2018-06-01 RX ORDER — PRAMIPEXOLE DIHYDROCHLORIDE 1 MG/1
1 TABLET ORAL 2 TIMES DAILY
Qty: 60 TAB | Refills: 2 | Status: SHIPPED | OUTPATIENT
Start: 2018-06-01 | End: 2018-09-17 | Stop reason: SDUPTHER

## 2018-06-20 ENCOUNTER — HOSPITAL ENCOUNTER (OUTPATIENT)
Dept: RADIOLOGY | Facility: MEDICAL CENTER | Age: 63
End: 2018-06-20
Attending: NURSE PRACTITIONER
Payer: MEDICARE

## 2018-06-20 DIAGNOSIS — Z78.0 MENOPAUSE: ICD-10-CM

## 2018-06-20 PROCEDURE — 77080 DXA BONE DENSITY AXIAL: CPT

## 2018-06-22 DIAGNOSIS — G47.9 SLEEP DIFFICULTIES: ICD-10-CM

## 2018-06-22 DIAGNOSIS — E03.9 ACQUIRED HYPOTHYROIDISM: ICD-10-CM

## 2018-06-22 RX ORDER — TRAZODONE HYDROCHLORIDE 50 MG/1
50 TABLET ORAL NIGHTLY PRN
Qty: 30 TAB | Refills: 0 | Status: SHIPPED | OUTPATIENT
Start: 2018-06-22 | End: 2018-08-27

## 2018-06-22 RX ORDER — LEVOTHYROXINE SODIUM 0.05 MG/1
50 TABLET ORAL
Qty: 30 TAB | Refills: 0 | Status: SHIPPED | OUTPATIENT
Start: 2018-06-22 | End: 2018-07-19 | Stop reason: SDUPTHER

## 2018-07-02 DIAGNOSIS — G47.9 SLEEP DIFFICULTIES: ICD-10-CM

## 2018-07-02 DIAGNOSIS — M19.90 ARTHRITIS: ICD-10-CM

## 2018-07-02 RX ORDER — GABAPENTIN 100 MG/1
CAPSULE ORAL
Qty: 90 CAP | Refills: 9 | Status: SHIPPED | OUTPATIENT
Start: 2018-07-02 | End: 2019-05-22 | Stop reason: SDUPTHER

## 2018-07-02 RX ORDER — TRAZODONE HYDROCHLORIDE 50 MG/1
50 TABLET ORAL NIGHTLY PRN
Qty: 90 TAB | Refills: 3 | Status: SHIPPED | OUTPATIENT
Start: 2018-07-02 | End: 2019-06-08 | Stop reason: SDUPTHER

## 2018-07-19 DIAGNOSIS — E03.9 ACQUIRED HYPOTHYROIDISM: ICD-10-CM

## 2018-07-19 RX ORDER — LEVOTHYROXINE SODIUM 0.05 MG/1
50 TABLET ORAL
Qty: 30 TAB | Refills: 9 | Status: SHIPPED | OUTPATIENT
Start: 2018-07-19 | End: 2019-04-19 | Stop reason: SDUPTHER

## 2018-08-27 ENCOUNTER — OFFICE VISIT (OUTPATIENT)
Dept: MEDICAL GROUP | Facility: MEDICAL CENTER | Age: 63
End: 2018-08-27
Payer: MEDICARE

## 2018-08-27 VITALS
OXYGEN SATURATION: 96 % | BODY MASS INDEX: 23.49 KG/M2 | TEMPERATURE: 98.2 F | HEART RATE: 75 BPM | WEIGHT: 141 LBS | SYSTOLIC BLOOD PRESSURE: 128 MMHG | RESPIRATION RATE: 16 BRPM | DIASTOLIC BLOOD PRESSURE: 70 MMHG | HEIGHT: 65 IN

## 2018-08-27 DIAGNOSIS — M81.0 AGE-RELATED OSTEOPOROSIS WITHOUT CURRENT PATHOLOGICAL FRACTURE: ICD-10-CM

## 2018-08-27 DIAGNOSIS — M25.50 ARTHRALGIA OF MULTIPLE JOINTS: ICD-10-CM

## 2018-08-27 DIAGNOSIS — G40.909 SEIZURE DISORDER (HCC): ICD-10-CM

## 2018-08-27 PROCEDURE — 99213 OFFICE O/P EST LOW 20 MIN: CPT | Performed by: NURSE PRACTITIONER

## 2018-08-27 RX ORDER — HYDROCODONE BITARTRATE AND ACETAMINOPHEN 7.5; 325 MG/1; MG/1
1-2 TABLET ORAL EVERY 6 HOURS PRN
COMMUNITY
End: 2021-07-21

## 2018-08-27 NOTE — PROGRESS NOTES
Subjective:      Malaika Garcia is a 63 y.o. female who presents with Follow-Up        CC: Patient here today for follow-up on seizure disorder and myalgias and arthralgias.    HPI Malaika Garcia      1. Age-related osteoporosis without current pathological fracture  Patient reports that she is now on some sort of daily injection through pain management for her osteoporosis but she cannot remember the name of it. She did have a bone density scan done in June which showed osteoporosis but did not follow back with us.    2. Seizure disorder (HCC)  Patient states she has not had a seizure since 2013 when she had her initial seizure and has not felt like she is going to have a seizure. She has been on Keppra for this but has been weaning it off herself has had no problems. Her main concern is being able to drive again so she needs to see neurology to discuss this.    3. Arthralgia of multiple joints  Patient was referred to pain management and is now on low-dose Norco for her chronic pain. Her hand joint survey showed diffuse osteoarthritis changes of both hands but no erosions. Her lab work was negative for CCP rheumatoid factor. She states her pain management doctor talked about the possibility of seeing rheumatology in the future.  Social History   Substance Use Topics   • Smoking status: Current Every Day Smoker     Packs/day: 0.50     Years: 15.00     Types: Cigarettes   • Smokeless tobacco: Never Used   • Alcohol use Yes      Comment: about drinks, wine or mixed drink      Past Medical History:   Diagnosis Date   • Arthritis    • RLS (restless legs syndrome)    • Seizure (HCC) 08/2013     Family History   Problem Relation Age of Onset   • Stroke Mother    • Cancer Mother    • Cancer Maternal Aunt    • Stroke Maternal Grandmother    • Cancer Paternal Grandfather        Review of Systems   Musculoskeletal: Positive for joint pain.   All other systems reviewed and are negative.         Objective:     /70   Pulse  "75   Temp 36.8 °C (98.2 °F)   Resp 16   Ht 1.651 m (5' 5\")   Wt 64 kg (141 lb)   SpO2 96%   BMI 23.46 kg/m²      Physical Exam   Constitutional: She is oriented to person, place, and time. She appears well-developed and well-nourished. No distress.   HENT:   Head: Normocephalic and atraumatic.   Right Ear: External ear normal.   Left Ear: External ear normal.   Nose: Nose normal.   Eyes: Right eye exhibits no discharge. Left eye exhibits no discharge.   Neck: Normal range of motion. Neck supple. No thyromegaly present.   Cardiovascular: Normal rate, regular rhythm and normal heart sounds.  Exam reveals no gallop and no friction rub.    No murmur heard.  Pulmonary/Chest: Effort normal and breath sounds normal. She has no wheezes. She has no rales.   Musculoskeletal: She exhibits no edema or tenderness.   Neurological: She is alert and oriented to person, place, and time. She displays normal reflexes.   Skin: Skin is warm and dry. No rash noted. She is not diaphoretic.   Psychiatric: She has a normal mood and affect. Her behavior is normal. Judgment and thought content normal.   Nursing note and vitals reviewed.              Assessment/Plan:     1. Age-related osteoporosis without current pathological fracture  I advised patient to contact me later regarding the medication she is being prescribed for another office for this. We discussed the risks for increased fracture until her bones improve.    2. Seizure disorder (HCC)  Patient states she has not had a seizure since 2013 and is coming off Keppra on her own. She would like to be able to drive again so she was referred to neurology to discuss this.  - REFERRAL TO NEUROLOGY    3. Arthralgia of multiple joints  Patient will continue to follow with pain management and I explained that with negative testing for rheumatoid arthritis, our rheumatologist typically will not see patient. They stated may be a referral through pain management would work best.      "

## 2018-09-17 DIAGNOSIS — G25.81 RESTLESS LEG SYNDROME: ICD-10-CM

## 2018-09-17 RX ORDER — PRAMIPEXOLE DIHYDROCHLORIDE 1 MG/1
TABLET ORAL
Qty: 60 TAB | Refills: 11 | Status: SHIPPED | OUTPATIENT
Start: 2018-09-17 | End: 2019-10-25 | Stop reason: SDUPTHER

## 2018-11-27 ENCOUNTER — OFFICE VISIT (OUTPATIENT)
Dept: NEUROLOGY | Facility: MEDICAL CENTER | Age: 63
End: 2018-11-27
Payer: MEDICARE

## 2018-11-27 VITALS
TEMPERATURE: 98 F | HEART RATE: 77 BPM | HEIGHT: 66 IN | OXYGEN SATURATION: 95 % | RESPIRATION RATE: 16 BRPM | DIASTOLIC BLOOD PRESSURE: 76 MMHG | WEIGHT: 141.8 LBS | SYSTOLIC BLOOD PRESSURE: 122 MMHG | BODY MASS INDEX: 22.79 KG/M2

## 2018-11-27 DIAGNOSIS — G40.909 SEIZURE DISORDER (HCC): Primary | ICD-10-CM

## 2018-11-27 PROCEDURE — 99205 OFFICE O/P NEW HI 60 MIN: CPT | Performed by: PSYCHIATRY & NEUROLOGY

## 2018-11-27 ASSESSMENT — ENCOUNTER SYMPTOMS
NECK PAIN: 1
MEMORY LOSS: 0
SEIZURES: 0
BACK PAIN: 1
LOSS OF CONSCIOUSNESS: 0
HEADACHES: 0
FALLS: 0
DIZZINESS: 0
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
## 2019-02-12 ENCOUNTER — PATIENT OUTREACH (OUTPATIENT)
Dept: HEALTH INFORMATION MANAGEMENT | Facility: OTHER | Age: 64
End: 2019-02-12

## 2019-02-12 NOTE — PROGRESS NOTES
Outcome: Left Message    Please transfer to Patient Outreach Team at [phone] when patient returns call.    WebIZ Checked & Epic Updated:  yes    HealthConnect Verified: yes    Attempt # 1

## 2019-02-21 NOTE — PROGRESS NOTES
1. Attempt #:1    2. HealthConnect Verified: yes    3. Verify PCP: yes    4. Review Care Team: yes    5. WebIZ Checked & Epic Updated: Yes      6. Reviewed/Updated the following with patient:       •   Communication Preference Obtained? YES       •   Preferred Pharmacy? YES       •   Preferred Lab? YES       •   Family History (document living status of immediate family members and if + hx of cancer, diabetes, hypertension, hyperlipidemia, heart attack, stroke) YES    7. Annual Wellness Visit Scheduling  · Scheduling Status:Scheduled     8. Care Gap Scheduling (Attempt to Schedule EACH Overdue Care Gap!)     Health Maintenance Due   Topic Date Due   • COLONOSCOPY  02/24/2003   • IMM ZOSTER VACCINES (2 of 2) 08/22/2018   • IMM INFLUENZA (1) 09/01/2018   • MAMMOGRAM  01/03/2019        Scheduled patient for Annual Wellness Visit/ mammogram     9. Zerply Activation: already active    10. Zerply Rosibel: no    11. Virtual Visits: no    12. Opt In to Text Messages: yes    13. Patient was advised: “This is a free wellness visit. The provider will screen for medical conditions to help you stay healthy. If you have other concerns to address you may be asked to discuss these at a separate visit or there may be an additional fee.”     14. Patient was informed to arrive 15 min prior to their scheduled appointment and bring in their medication bottles.

## 2019-03-07 ENCOUNTER — HOSPITAL ENCOUNTER (OUTPATIENT)
Dept: RADIOLOGY | Facility: REHABILITATION | Age: 64
End: 2019-03-07
Attending: PHYSICAL MEDICINE & REHABILITATION

## 2019-03-07 ENCOUNTER — HOSPITAL ENCOUNTER (OUTPATIENT)
Dept: PAIN MANAGEMENT | Facility: REHABILITATION | Age: 64
End: 2019-03-07
Attending: PHYSICAL MEDICINE & REHABILITATION
Payer: MEDICARE

## 2019-03-07 VITALS
OXYGEN SATURATION: 97 % | RESPIRATION RATE: 15 BRPM | TEMPERATURE: 97.2 F | WEIGHT: 147.27 LBS | HEART RATE: 60 BPM | DIASTOLIC BLOOD PRESSURE: 80 MMHG | HEIGHT: 66 IN | SYSTOLIC BLOOD PRESSURE: 145 MMHG | BODY MASS INDEX: 23.67 KG/M2

## 2019-03-07 PROCEDURE — 99152 MOD SED SAME PHYS/QHP 5/>YRS: CPT

## 2019-03-07 PROCEDURE — 700111 HCHG RX REV CODE 636 W/ 250 OVERRIDE (IP)

## 2019-03-07 PROCEDURE — G0260 INJ FOR SACROILIAC JT ANESTH: HCPCS

## 2019-03-07 PROCEDURE — 700117 HCHG RX CONTRAST REV CODE 255

## 2019-03-07 RX ORDER — LIDOCAINE HYDROCHLORIDE 10 MG/ML
INJECTION, SOLUTION EPIDURAL; INFILTRATION; INTRACAUDAL; PERINEURAL
Status: COMPLETED
Start: 2019-03-07 | End: 2019-03-07

## 2019-03-07 RX ORDER — MIDAZOLAM HYDROCHLORIDE 1 MG/ML
INJECTION INTRAMUSCULAR; INTRAVENOUS
Status: COMPLETED
Start: 2019-03-07 | End: 2019-03-07

## 2019-03-07 RX ORDER — DEXAMETHASONE SODIUM PHOSPHATE 10 MG/ML
INJECTION, SOLUTION INTRAMUSCULAR; INTRAVENOUS
Status: COMPLETED
Start: 2019-03-07 | End: 2019-03-07

## 2019-03-07 RX ORDER — KETOROLAC TROMETHAMINE 30 MG/ML
INJECTION, SOLUTION INTRAMUSCULAR; INTRAVENOUS
Status: COMPLETED
Start: 2019-03-07 | End: 2019-03-07

## 2019-03-07 RX ORDER — TRIAMCINOLONE ACETONIDE 40 MG/ML
INJECTION, SUSPENSION INTRA-ARTICULAR; INTRAMUSCULAR
Status: COMPLETED
Start: 2019-03-07 | End: 2019-03-07

## 2019-03-07 RX ORDER — LIDOCAINE HYDROCHLORIDE 20 MG/ML
INJECTION, SOLUTION EPIDURAL; INFILTRATION; INTRACAUDAL; PERINEURAL
Status: COMPLETED
Start: 2019-03-07 | End: 2019-03-07

## 2019-03-07 RX ADMIN — LIDOCAINE HYDROCHLORIDE 5 ML: 10 INJECTION, SOLUTION EPIDURAL; INFILTRATION; INTRACAUDAL; PERINEURAL at 13:13

## 2019-03-07 RX ADMIN — FENTANYL CITRATE 50 MCG: 50 INJECTION, SOLUTION INTRAMUSCULAR; INTRAVENOUS at 13:06

## 2019-03-07 RX ADMIN — IOHEXOL 5 ML: 240 INJECTION, SOLUTION INTRATHECAL; INTRAVASCULAR; INTRAVENOUS; ORAL at 13:16

## 2019-03-07 RX ADMIN — TRIAMCINOLONE ACETONIDE 40 MG: 40 INJECTION, SUSPENSION INTRA-ARTICULAR; INTRAMUSCULAR at 13:22

## 2019-03-07 RX ADMIN — DEXAMETHASONE SODIUM PHOSPHATE 10 MG: 10 INJECTION, SOLUTION INTRAMUSCULAR; INTRAVENOUS at 13:22

## 2019-03-07 RX ADMIN — MIDAZOLAM HYDROCHLORIDE 1 MG: 1 INJECTION, SOLUTION INTRAMUSCULAR; INTRAVENOUS at 13:06

## 2019-03-07 NOTE — NON-PROVIDER
Medication reconciliation reviewed with patient. Denied taking any blood thinners and  any anti- inflammatories medications. Home care form and verbal instruction given to patient and verbalized understanding.Patient had a .Stop bang score # 2  She's been off  Motrin for 2 days.  Dr. iMchaud made aware and assessed patient. . Hand off reported to . Gentiluomo WINSTON.

## 2019-03-07 NOTE — NON-PROVIDER
1330 PM    . Received ambulatory accompanied by RN.  Patient awake, alert and verbally responsive. Tolerated fluids well.  Ice pack applied to affected area. Patient able to  move all extremities without difficulty voluntarily and on command. Reviewed home care instructions and understood by patient.

## 2019-03-08 NOTE — NON-PROVIDER
Patient positioned pre-procedure by RNs and ,xray tech. Pillow placed under lower legs and feet for support.

## 2019-03-11 ENCOUNTER — OFFICE VISIT (OUTPATIENT)
Dept: MEDICAL GROUP | Facility: MEDICAL CENTER | Age: 64
End: 2019-03-11
Payer: MEDICARE

## 2019-03-11 ENCOUNTER — HOSPITAL ENCOUNTER (OUTPATIENT)
Dept: LAB | Facility: MEDICAL CENTER | Age: 64
End: 2019-03-11
Attending: NURSE PRACTITIONER
Payer: MEDICARE

## 2019-03-11 VITALS
BODY MASS INDEX: 22.82 KG/M2 | WEIGHT: 142 LBS | SYSTOLIC BLOOD PRESSURE: 118 MMHG | HEIGHT: 66 IN | RESPIRATION RATE: 16 BRPM | DIASTOLIC BLOOD PRESSURE: 72 MMHG | HEART RATE: 67 BPM | OXYGEN SATURATION: 95 %

## 2019-03-11 DIAGNOSIS — B07.0 PLANTAR WART: ICD-10-CM

## 2019-03-11 DIAGNOSIS — G40.909 SEIZURE DISORDER (HCC): ICD-10-CM

## 2019-03-11 DIAGNOSIS — E03.9 ACQUIRED HYPOTHYROIDISM: ICD-10-CM

## 2019-03-11 DIAGNOSIS — Z23 NEED FOR SHINGLES VACCINE: ICD-10-CM

## 2019-03-11 DIAGNOSIS — Z13.220 SCREENING CHOLESTEROL LEVEL: ICD-10-CM

## 2019-03-11 DIAGNOSIS — M46.1 SACROILIITIS, NOT ELSEWHERE CLASSIFIED (HCC): ICD-10-CM

## 2019-03-11 DIAGNOSIS — Z12.39 SCREENING FOR BREAST CANCER: ICD-10-CM

## 2019-03-11 PROBLEM — G89.4 CHRONIC PAIN SYNDROME: Status: RESOLVED | Noted: 2017-12-04 | Resolved: 2019-03-11

## 2019-03-11 LAB
ALBUMIN SERPL BCP-MCNC: 4.1 G/DL (ref 3.2–4.9)
ALBUMIN/GLOB SERPL: 1.5 G/DL
ALP SERPL-CCNC: 71 U/L (ref 30–99)
ALT SERPL-CCNC: 32 U/L (ref 2–50)
ANION GAP SERPL CALC-SCNC: 7 MMOL/L (ref 0–11.9)
AST SERPL-CCNC: 30 U/L (ref 12–45)
BILIRUB SERPL-MCNC: 0.6 MG/DL (ref 0.1–1.5)
BUN SERPL-MCNC: 18 MG/DL (ref 8–22)
CALCIUM SERPL-MCNC: 9.3 MG/DL (ref 8.5–10.5)
CHLORIDE SERPL-SCNC: 103 MMOL/L (ref 96–112)
CHOLEST SERPL-MCNC: 174 MG/DL (ref 100–199)
CO2 SERPL-SCNC: 27 MMOL/L (ref 20–33)
CREAT SERPL-MCNC: 0.82 MG/DL (ref 0.5–1.4)
FASTING STATUS PATIENT QL REPORTED: NORMAL
GLOBULIN SER CALC-MCNC: 2.8 G/DL (ref 1.9–3.5)
GLUCOSE SERPL-MCNC: 76 MG/DL (ref 65–99)
HDLC SERPL-MCNC: 75 MG/DL
LDLC SERPL CALC-MCNC: 83 MG/DL
POTASSIUM SERPL-SCNC: 3.9 MMOL/L (ref 3.6–5.5)
PROT SERPL-MCNC: 6.9 G/DL (ref 6–8.2)
SODIUM SERPL-SCNC: 137 MMOL/L (ref 135–145)
TRIGL SERPL-MCNC: 81 MG/DL (ref 0–149)
TSH SERPL DL<=0.005 MIU/L-ACNC: 0.66 UIU/ML (ref 0.38–5.33)

## 2019-03-11 PROCEDURE — 99213 OFFICE O/P EST LOW 20 MIN: CPT | Mod: 25 | Performed by: NURSE PRACTITIONER

## 2019-03-11 PROCEDURE — 80053 COMPREHEN METABOLIC PANEL: CPT

## 2019-03-11 PROCEDURE — 84443 ASSAY THYROID STIM HORMONE: CPT

## 2019-03-11 PROCEDURE — 90471 IMMUNIZATION ADMIN: CPT | Performed by: NURSE PRACTITIONER

## 2019-03-11 PROCEDURE — 8041 PR SCP AHA: Performed by: NURSE PRACTITIONER

## 2019-03-11 PROCEDURE — 36415 COLL VENOUS BLD VENIPUNCTURE: CPT

## 2019-03-11 PROCEDURE — 90750 HZV VACC RECOMBINANT IM: CPT | Performed by: NURSE PRACTITIONER

## 2019-03-11 PROCEDURE — 80061 LIPID PANEL: CPT

## 2019-03-11 ASSESSMENT — PATIENT HEALTH QUESTIONNAIRE - PHQ9: CLINICAL INTERPRETATION OF PHQ2 SCORE: 0

## 2019-03-11 ASSESSMENT — ENCOUNTER SYMPTOMS: BACK PAIN: 1

## 2019-03-11 NOTE — PROGRESS NOTES
Subjective:      Malaika Garcia is a 64 y.o. female who presents with Follow-Up (6 month )        CC: Patient here today for follow-up on seizure disorder, sacroiliitis, hypothyroidism, AHA visit, and wanting a wart treated with liquid nitrogen.    HPI Malaika Garcia        1. Seizure disorder (HCC)  Patient has history of possible seizure disorder for which she has been using antiseizure medicines for years.  She was referred to neurology recently for follow-up on this and there was some question as to whether she truly had seizures or syncopal episodes.  She was to go for a EEG study but she states it has not been ordered as of yet.  She reports no seizure activity    2. Sacroiliitis, not elsewhere classified (HCC)  Patient continues to follow with pain management which is prescribing her Norco and gabapentin for her chronic back pain and finds it helpful.    3. Acquired hypothyroidism  Patient on low-dose levothyroxine 50 mcg and did blood work today but results are not available as of time of visit.  She reports no fatigue.    4. Plantar wart  Patient states she has a wart like area on her left fourth toe in the web area where it rubs against her large toe.  She states she has had this frozen in the past with success and would like this done again because it is painful when it rubs.    5. Screening for breast cancer  Patient due for mammogram.    6. Need for shingles vaccine  Patient reports she had her first shingles vaccine elsewhere and needs her second one today.  Social History   Substance Use Topics   • Smoking status: Current Every Day Smoker     Packs/day: 0.50     Years: 15.00     Types: Cigarettes   • Smokeless tobacco: Never Used   • Alcohol use Yes      Comment: about drinks, wine or mixed drink      Current Outpatient Prescriptions   Medication Sig Dispense Refill   • pramipexole (MIRAPEX) 1 MG Tab TAKE 1 TABLET BY MOUTH TWICE A DAY 60 Tab 11   • HYDROcodone-acetaminophen (NORCO) 7.5-325 MG per tablet  "Take 1-2 Tabs by mouth every 6 hours as needed.     • levothyroxine (SYNTHROID) 50 MCG Tab TAKE 1 TAB BY MOUTH EVERY MORNING ON AN EMPTY STOMACH. 30 Tab 9   • gabapentin (NEURONTIN) 100 MG Cap TAKE 1 CAPSULE BY MOUTH THREE TIMES A DAY 90 Cap 9   • traZODone (DESYREL) 50 MG Tab TAKE 1 TAB BY MOUTH AT BEDTIME AS NEEDED FOR SLEEP. 90 Tab 3   • bumetanide (BUMEX) 1 MG Tab Take 1 Tab by mouth every day. 30 Tab 2   • Multiple Vitamins-Minerals (MULTIVITAMIN ADULT PO) Take  by mouth.     • Cholecalciferol (VITAMIN D3) 1000 units Cap Take  by mouth.       No current facility-administered medications for this visit.      Family History   Problem Relation Age of Onset   • Stroke Mother    • Cancer Mother    • Hypertension Mother    • Cancer Maternal Aunt    • Stroke Maternal Grandmother    • Hypertension Maternal Grandmother    • Cancer Paternal Grandfather      Past Medical History:   Diagnosis Date   • Arthritis    • RLS (restless legs syndrome)    • Seizure (HCC) 08/2013       Review of Systems   Musculoskeletal: Positive for back pain.   Skin: Positive for rash.   All other systems reviewed and are negative.         Objective:     /72 (BP Location: Right arm, Patient Position: Sitting, BP Cuff Size: Adult)   Pulse 67   Resp 16   Ht 1.676 m (5' 6\")   Wt 64.4 kg (142 lb)   SpO2 95%   BMI 22.92 kg/m²      Physical Exam   Constitutional: She is oriented to person, place, and time. She appears well-developed and well-nourished. No distress.   HENT:   Head: Normocephalic and atraumatic.   Right Ear: External ear normal.   Left Ear: External ear normal.   Nose: Nose normal.   Eyes: Right eye exhibits no discharge. Left eye exhibits no discharge.   Neck: Normal range of motion. Neck supple. No thyromegaly present.   Cardiovascular: Normal rate, regular rhythm and normal heart sounds.  Exam reveals no gallop and no friction rub.    No murmur heard.  Pulmonary/Chest: Effort normal and breath sounds normal. She has no " wheezes. She has no rales.   Musculoskeletal: She exhibits no edema or tenderness.   Neurological: She is alert and oriented to person, place, and time. She displays normal reflexes.   Skin: Skin is warm and dry. No rash noted. She is not diaphoretic.   There is a wartlike growth on the left fourth toe next to the fifth toe which is mildly tender but not erythematous.   Psychiatric: She has a normal mood and affect. Her behavior is normal. Judgment and thought content normal.   Nursing note and vitals reviewed.         Annual Health Assessment Questions:    1.  Are you currently engaging in any exercise or physical activity? Yes    2.  How would you describe your mood or emotional well-being today? good    3.  Have you had any falls in the last year? No    4.  Have you noticed any problems with your balance or had difficulty walking? No    5.  In the last six months have you experienced any leakage of urine? Yes    6. DPA/Advanced Directive: Patient does not have an Advanced Directive.  A packet and workshop information was given on Advanced Directives.     Assessment/Plan:     1. Seizure disorder (HCC)  Patient reports no further seizures for years but I do not see the EEG ordered as recommended by neurology so I did advise her to contact their office to set this up.    2. Sacroiliitis, not elsewhere classified (HCC)  Patient will continue to follow with pain management which is prescribing her Norco and she feels it is helping with her back pain.  She also is on gabapentin and trazodone.    3. Acquired hypothyroidism  I am awaiting her lab work to see if we need to adjust medication dosage.    4. Plantar wart  Area on her toe was treated with liquid nitrogen and if it does not improve I recommended podiatry.    5. Screening for breast cancer    - MA-SCREENING MAMMO BILAT W/TOMOSYNTHESIS W/CAD; Future    6. Need for shingles vaccine  I have placed the below orders and discussed them with an approved delegating  provider. The MA is performing the below orders under the direction of Dr. Nickerson    - Monico Vaccine (Shingrix)

## 2019-03-12 ENCOUNTER — APPOINTMENT (OUTPATIENT)
Dept: OTHER | Facility: IMAGING CENTER | Age: 64
End: 2019-03-12

## 2019-04-19 DIAGNOSIS — E03.9 ACQUIRED HYPOTHYROIDISM: ICD-10-CM

## 2019-04-19 RX ORDER — LEVOTHYROXINE SODIUM 0.05 MG/1
50 TABLET ORAL
Qty: 30 TAB | Refills: 10 | Status: SHIPPED | OUTPATIENT
Start: 2019-04-19 | End: 2020-02-26

## 2019-05-15 ENCOUNTER — OFFICE VISIT (OUTPATIENT)
Dept: MEDICAL GROUP | Facility: MEDICAL CENTER | Age: 64
End: 2019-05-15
Payer: MEDICARE

## 2019-05-15 VITALS
BODY MASS INDEX: 22.02 KG/M2 | SYSTOLIC BLOOD PRESSURE: 112 MMHG | DIASTOLIC BLOOD PRESSURE: 62 MMHG | OXYGEN SATURATION: 96 % | HEART RATE: 76 BPM | HEIGHT: 66 IN | WEIGHT: 137 LBS | TEMPERATURE: 97.2 F

## 2019-05-15 DIAGNOSIS — M46.1 SACROILIITIS, NOT ELSEWHERE CLASSIFIED (HCC): ICD-10-CM

## 2019-05-15 DIAGNOSIS — E03.9 ACQUIRED HYPOTHYROIDISM: ICD-10-CM

## 2019-05-15 DIAGNOSIS — G25.81 RESTLESS LEG SYNDROME: ICD-10-CM

## 2019-05-15 DIAGNOSIS — M81.0 AGE-RELATED OSTEOPOROSIS WITHOUT CURRENT PATHOLOGICAL FRACTURE: ICD-10-CM

## 2019-05-15 DIAGNOSIS — Z12.39 SCREENING FOR BREAST CANCER: ICD-10-CM

## 2019-05-15 DIAGNOSIS — Z86.69 HISTORY OF SEIZURE DISORDER: ICD-10-CM

## 2019-05-15 DIAGNOSIS — M25.50 ARTHRALGIA OF MULTIPLE JOINTS: ICD-10-CM

## 2019-05-15 DIAGNOSIS — Z00.00 MEDICARE ANNUAL WELLNESS VISIT, SUBSEQUENT: ICD-10-CM

## 2019-05-15 PROBLEM — M79.674 PAIN OF TOE OF RIGHT FOOT: Status: RESOLVED | Noted: 2018-02-06 | Resolved: 2019-05-15

## 2019-05-15 PROBLEM — G40.909 SEIZURE DISORDER (HCC): Status: RESOLVED | Noted: 2017-12-04 | Resolved: 2019-05-15

## 2019-05-15 PROBLEM — G47.9 SLEEP DIFFICULTIES: Status: RESOLVED | Noted: 2018-02-06 | Resolved: 2019-05-15

## 2019-05-15 PROCEDURE — G0439 PPPS, SUBSEQ VISIT: HCPCS | Performed by: NURSE PRACTITIONER

## 2019-05-15 RX ORDER — ALENDRONATE SODIUM 70 MG/1
70 TABLET ORAL
Qty: 4 TAB | Status: SHIPPED | DISCHARGE
Start: 2019-05-15 | End: 2020-10-20

## 2019-05-15 ASSESSMENT — ENCOUNTER SYMPTOMS: GENERAL WELL-BEING: GOOD

## 2019-05-15 ASSESSMENT — PATIENT HEALTH QUESTIONNAIRE - PHQ9: CLINICAL INTERPRETATION OF PHQ2 SCORE: 0

## 2019-05-15 ASSESSMENT — ACTIVITIES OF DAILY LIVING (ADL): BATHING_REQUIRES_ASSISTANCE: 0

## 2019-05-15 NOTE — PROGRESS NOTES
No chief complaint on file.        HPI:  Malaika is a 64 y.o. here for Medicare Annual Wellness Visit    1. Medicare annual wellness visit, subsequent  Screening performed below.    2. Acquired hypothyroidism  Patient currently on levothyroxine 50 mcg and her TSH from March was therapeutic at 0.6.  She reports no excessive anxiety or fatigue.    3. Restless leg syndrome  Patient on Mirapex and it appears to be working well for her.    4. Arthralgia of multiple joints  Patient currently on trazodone, Norco and gabapentin for this and feels it is helpful.    5. Age-related osteoporosis without current pathological fracture  Patient had bone density scan last year which showed osteoporosis and she is currently on Fosamax through another office.  She states they normally order her bone density scan and she has an appointment with them in the next month.  She reports no side effects from the Fosamax.    6. Sacroiliitis, not elsewhere classified (HCC)  Patient's follows with [facility] for this which is prescribing her opiates and she states that is unchanged.    7. Screening for breast cancer  Patient due for yearly screening.    8. History of seizure disorder  Patient has history of seizure disorder and she has not had a seizure since her initial one in the past and she states she did see neurology in the last year and was cleared to drive and does not need to take medications.    Patient Active Problem List    Diagnosis Date Noted   • Sacroiliitis, not elsewhere classified (HCC) 03/11/2019   • Age-related osteoporosis without current pathological fracture 08/27/2018   • Arthralgia of multiple joints 05/09/2018   • Sleep difficulties 02/06/2018   • Seizure disorder (HCC) 12/04/2017   • Acquired hypothyroidism 12/04/2017   • Restless leg syndrome 12/04/2017       Current Outpatient Prescriptions   Medication Sig Dispense Refill   • levothyroxine (SYNTHROID) 50 MCG Tab TAKE 1 TAB BY MOUTH EVERY MORNING ON AN EMPTY  STOMACH. 30 Tab 10   • pramipexole (MIRAPEX) 1 MG Tab TAKE 1 TABLET BY MOUTH TWICE A DAY 60 Tab 11   • HYDROcodone-acetaminophen (NORCO) 7.5-325 MG per tablet Take 1-2 Tabs by mouth every 6 hours as needed.     • gabapentin (NEURONTIN) 100 MG Cap TAKE 1 CAPSULE BY MOUTH THREE TIMES A DAY 90 Cap 9   • traZODone (DESYREL) 50 MG Tab TAKE 1 TAB BY MOUTH AT BEDTIME AS NEEDED FOR SLEEP. 90 Tab 3   • Multiple Vitamins-Minerals (MULTIVITAMIN ADULT PO) Take  by mouth.     • Cholecalciferol (VITAMIN D3) 1000 units Cap Take  by mouth.     • bumetanide (BUMEX) 1 MG Tab Take 1 Tab by mouth every day. 30 Tab 2     No current facility-administered medications for this visit.         Patient is taking medications as noted in medication list.  Current supplements as per medication list.     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 No, due for PREVNAR (PCV13) , TDAP and SHINGRIX (Shingles). Patient is interested in receiving NONE today.    She  reports that she has been smoking Cigarettes.  She has a 7.50 pack-year smoking history. She has never used smokeless tobacco. She reports that she drinks alcohol. She reports that she uses drugs, including Marijuana.  Ready to quit: No  Counseling given: Not Answered        DPA/Advanced directive:     ROS:    Gait: Uses no assistive device   Ostomy: No   Other tubes: No   Amputations: No   Chronic oxygen use No   Last eye exam 3 years ago   Wears hearing aids: No   : Denies any urinary leakage during the last 6 months        Depression Screening    Little interest or pleasure in doing things?  0 - not at all  Feeling down, depressed, or hopeless? 0 - not at all  Patient Health Questionnaire Score: 0    If depressive symptoms identified deferred to follow up visit unless specifically addressed in assessment and plan.    Interpretation of PHQ-9 Total  Score   Score Severity   1-4 No Depression   5-9 Mild Depression   10-14 Moderate Depression   15-19 Moderately Severe Depression   20-27 Severe Depression    Screening for Cognitive Impairment    Three Minute Recall (village, kitchen, baby)  3/3    Rich clock face with all 12 numbers and set the hands to show 10 past 10.  Yes 5/5  If cognitive concerns identified, deferred for follow up unless specifically addressed in assessment and plan.    Fall Risk Assessment    Has the patient had two or more falls in the last year or any fall with injury in the last year?  No  If fall risk identified, deferred for follow up unless specifically addressed in assessment and plan.    Safety Assessment    Throw rugs on floor.  No  Handrails on all stairs.  Yes  Good lighting in all hallways.  Yes  Difficulty hearing.  No  Patient counseled about all safety risks that were identified.    Functional Assessment ADLs    Are there any barriers preventing you from cooking for yourself or meeting nutritional needs?  No.    Are there any barriers preventing you from driving safely or obtaining transportation?  No.    Are there any barriers preventing you from using a telephone or calling for help?  No.    Are there any barriers preventing you from shopping?  No.    Are there any barriers preventing you from taking care of your own finances?  No.    Are there any barriers preventing you from managing your medications?  No.    Are there any barriers preventing you from showering, bathing or dressing yourself?  No.    Are you currently engaging in any exercise or physical activity?  Yes.  3 x per week   What is your perception of your health?  Good.    Health Maintenance Summary                IMM DTaP/Tdap/Td Vaccine Overdue 2/24/1972     IMM PNEUMOCOCCAL 65+ (ADULT) LOW/MEDIUM RISK SERIES Overdue 2/24/2018     MAMMOGRAM Overdue 1/3/2019      Done 1/3/2018 MA-SCREEN MAMMO W/CAD-BILAT    Annual Wellness Visit Overdue 5/10/2019      Done  "5/9/2018 Visit Dx: Medicare annual wellness visit, subsequent    IMM INFLUENZA Next Due 9/1/2019     COLONOSCOPY Next Due 4/8/2022      Done 4/8/2019 REFERRAL TO GI FOR COLONOSCOPY    BONE DENSITY Next Due 6/20/2023      Done 6/20/2018 DS-BONE DENSITY STUDY (DEXA)          Patient Care Team:  SAVAGE Luna as PCP - General (Family Medicine)  [facility]    Social History   Substance Use Topics   • Smoking status: Current Every Day Smoker     Packs/day: 0.50     Years: 15.00     Types: Cigarettes   • Smokeless tobacco: Never Used   • Alcohol use Yes      Comment: about drinks, wine or mixed drink      Family History   Problem Relation Age of Onset   • Stroke Mother    • Cancer Mother    • Hypertension Mother    • Cancer Maternal Aunt    • Stroke Maternal Grandmother    • Hypertension Maternal Grandmother    • Cancer Paternal Grandfather      She  has a past medical history of Arthritis; RLS (restless legs syndrome); and Seizure (HCC) (08/2013).   Past Surgical History:   Procedure Laterality Date   • THYROIDECTOMY  1994    partial           Exam:     /62 (BP Location: Left arm, Patient Position: Sitting, BP Cuff Size: Adult)   Pulse 76   Temp 36.2 °C (97.2 °F) (Temporal)   Ht 1.676 m (5' 6\")   Wt 62.1 kg (137 lb)   SpO2 96%  Body mass index is 22.11 kg/m².    Hearing good.    Dentition good  Alert, oriented in no acute distress.  Eye contact is good, speech goal directed, affect calm      Assessment and Plan. The following treatment and monitoring plan is recommended:         1. Medicare annual wellness visit, subsequent  Annual wellness topics discussed, review of chronic medical problems completed    - Subsequent Annual Wellness Visit - Includes PPPS ()    2. Acquired hypothyroidism  Patient's TSH therapeutic on current dose of levothyroxine 50 mcg and I recommended yearly testing.    3. Restless leg syndrome  Patient currently on Mirapex and feels it is helping and no change in dosing " needed.    4. Arthralgia of multiple joints  Patient currently being treated through [facility].    5. Age-related osteoporosis without current pathological fracture  Medication is coming through [facility] and I recommended she get her bone density scan next month either through their office or our office to see if the Fosamax is working.  I explained we usually keep people on this for 5 years and then stop and evaluate.  - alendronate (FOSAMAX) 70 MG Tab; Take 1 Tab by mouth every 7 days.  Dispense: 4 Tab    6. Sacroiliitis, not elsewhere classified (HCC)  Patient getting her Norco 3 times a day through [facility] and she is also taking gabapentin and trazodone for pain.    7. Screening for breast cancer    - MA-SCREENING MAMMO BILAT W/TOMOSYNTHESIS W/CAD; Future    8. History of seizure disorder  Patient no longer needing to use seizure medication and is cleared through neurology.    Services suggested: No services needed at this time  Health Care Screening recommendations as per orders if indicated.  Referrals offered: PT/OT/Nutrition counseling/Behavioral Health/Smoking cessation as per orders if indicated.    Discussion today about general wellness and lifestyle habits:    · Prevent falls and reduce trip hazards; Cautioned about securing or removing rugs.  · Have a working fire alarm and carbon monoxide detector;   · Engage in regular physical activity and social activities       Follow-up: No Follow-up on file.

## 2019-05-22 DIAGNOSIS — M19.90 ARTHRITIS: ICD-10-CM

## 2019-05-22 RX ORDER — GABAPENTIN 100 MG/1
CAPSULE ORAL
Qty: 90 CAP | Refills: 10 | Status: SHIPPED | OUTPATIENT
Start: 2019-05-22 | End: 2020-02-21

## 2019-06-08 DIAGNOSIS — G47.9 SLEEP DIFFICULTIES: ICD-10-CM

## 2019-06-10 RX ORDER — TRAZODONE HYDROCHLORIDE 50 MG/1
50 TABLET ORAL NIGHTLY PRN
Qty: 90 TAB | Refills: 3 | Status: SHIPPED | OUTPATIENT
Start: 2019-06-10 | End: 2020-07-13

## 2019-07-01 ENCOUNTER — OFFICE VISIT (OUTPATIENT)
Dept: MEDICAL GROUP | Facility: MEDICAL CENTER | Age: 64
End: 2019-07-01
Payer: MEDICARE

## 2019-07-01 VITALS
BODY MASS INDEX: 22.02 KG/M2 | RESPIRATION RATE: 16 BRPM | WEIGHT: 137 LBS | HEIGHT: 66 IN | OXYGEN SATURATION: 97 % | DIASTOLIC BLOOD PRESSURE: 72 MMHG | SYSTOLIC BLOOD PRESSURE: 124 MMHG | HEART RATE: 81 BPM

## 2019-07-01 DIAGNOSIS — S91.331A PUNCTURE WOUND OF RIGHT FOOT, INITIAL ENCOUNTER: ICD-10-CM

## 2019-07-01 DIAGNOSIS — Z23 NEED FOR TDAP VACCINATION: ICD-10-CM

## 2019-07-01 PROCEDURE — 90715 TDAP VACCINE 7 YRS/> IM: CPT | Performed by: NURSE PRACTITIONER

## 2019-07-01 PROCEDURE — 90471 IMMUNIZATION ADMIN: CPT | Performed by: NURSE PRACTITIONER

## 2019-07-01 PROCEDURE — 99213 OFFICE O/P EST LOW 20 MIN: CPT | Mod: 25 | Performed by: NURSE PRACTITIONER

## 2019-07-02 NOTE — PROGRESS NOTES
Subjective:      Malaika Garcia is a 64 y.o. female who presents with Other (pt step on a nail)        CC: Patient here today for puncture wound of her right foot.    HPI Malaika Garcia      1. Puncture wound of right foot, initial encounter  Patient reports that yesterday she was working in her yard when she excellently stepped on a nail that was in a board on the ground in her yard.  It went through her flip-flop and entered upper plantar surface of her right foot.  She states it bled slightly and she treated it with peroxide and has been trying to keep pressure off the area.  She did not see any foreign body entering her foot.  She denies spreading redness, purulent discharge or fever.    2. Need for Tdap vaccination  Patient believes it is been over 5 years since her last TD AP.  Current Outpatient Prescriptions   Medication Sig Dispense Refill   • traZODone (DESYREL) 50 MG Tab TAKE 1 TAB BY MOUTH AT BEDTIME AS NEEDED FOR SLEEP. 90 Tab 3   • gabapentin (NEURONTIN) 100 MG Cap TAKE 1 CAPSULE BY MOUTH THREE TIMES A DAY 90 Cap 10   • alendronate (FOSAMAX) 70 MG Tab Take 1 Tab by mouth every 7 days. 4 Tab    • levothyroxine (SYNTHROID) 50 MCG Tab TAKE 1 TAB BY MOUTH EVERY MORNING ON AN EMPTY STOMACH. 30 Tab 10   • pramipexole (MIRAPEX) 1 MG Tab TAKE 1 TABLET BY MOUTH TWICE A DAY 60 Tab 11   • HYDROcodone-acetaminophen (NORCO) 7.5-325 MG per tablet Take 1-2 Tabs by mouth every 6 hours as needed.     • Multiple Vitamins-Minerals (MULTIVITAMIN ADULT PO) Take  by mouth.     • Cholecalciferol (VITAMIN D3) 1000 units Cap Take  by mouth.     • bumetanide (BUMEX) 1 MG Tab Take 1 Tab by mouth every day. 30 Tab 2     No current facility-administered medications for this visit.      Social History   Substance Use Topics   • Smoking status: Current Every Day Smoker     Packs/day: 0.50     Years: 15.00     Types: Cigarettes   • Smokeless tobacco: Never Used   • Alcohol use Yes      Comment: about drinks, wine or mixed drink      Past  "Medical History:   Diagnosis Date   • Arthritis    • RLS (restless legs syndrome)    • Seizure (HCC) 08/2013     Family History   Problem Relation Age of Onset   • Stroke Mother    • Cancer Mother    • Hypertension Mother    • Cancer Maternal Aunt    • Stroke Maternal Grandmother    • Hypertension Maternal Grandmother    • Cancer Paternal Grandfather        Review of Systems   All other systems reviewed and are negative.         Objective:     /72 (BP Location: Right arm, Patient Position: Sitting, BP Cuff Size: Adult)   Pulse 81   Resp 16   Ht 1.676 m (5' 6\")   Wt 62.1 kg (137 lb)   SpO2 97%   BMI 22.11 kg/m²      Physical Exam   Constitutional: She is oriented to person, place, and time. She appears well-developed and well-nourished. No distress.   HENT:   Head: Normocephalic and atraumatic.   Right Ear: External ear normal.   Left Ear: External ear normal.   Nose: Nose normal.   Eyes: Right eye exhibits no discharge. Left eye exhibits no discharge.   Neck: Normal range of motion. Neck supple. No thyromegaly present.   Cardiovascular: Normal rate, regular rhythm and normal heart sounds.  Exam reveals no gallop and no friction rub.    No murmur heard.  Pulmonary/Chest: Effort normal and breath sounds normal. She has no wheezes. She has no rales.   Musculoskeletal: She exhibits no edema or tenderness.   Neurological: She is alert and oriented to person, place, and time. She displays normal reflexes.   Skin: Skin is warm and dry. No rash noted. She is not diaphoretic.   There is a puncture area on the plantar surface of the right foot.  There is scant discharge and mild tenderness.  No obvious foreign body seen.   Psychiatric: She has a normal mood and affect. Her behavior is normal. Judgment and thought content normal.   Nursing note and vitals reviewed.              Assessment/Plan:     1. Puncture wound of right foot, initial encounter  I reviewed with patient the dangers with a puncture to the foot " and she will receive a TD AP today.  I explained that the nail going through her foot where can introduce bacteria into the area and she will be placed on Cipro 500 mg twice daily for 5 days to cover for Pseudomonas.  Advised to stop the medicine if she develops any joint pain on the antibiotic.  The area was irrigated with sterile saline and then redressed.  Signs and symptoms of infection discussed.  I advised her to go to urgent care to have this probed if it does not improve.    2. Need for Tdap vaccination  I have placed the below orders and discussed them with an approved delegating provider. The MA is performing the below orders under the direction of Dr. Jenkins    - Tdap =>6yo IM

## 2019-07-25 ENCOUNTER — HOSPITAL ENCOUNTER (OUTPATIENT)
Dept: RADIOLOGY | Facility: MEDICAL CENTER | Age: 64
End: 2019-07-25
Attending: NURSE PRACTITIONER
Payer: MEDICARE

## 2019-07-25 DIAGNOSIS — Z12.39 SCREENING FOR BREAST CANCER: ICD-10-CM

## 2019-07-25 PROCEDURE — 77063 BREAST TOMOSYNTHESIS BI: CPT

## 2020-01-07 ENCOUNTER — PATIENT OUTREACH (OUTPATIENT)
Dept: HEALTH INFORMATION MANAGEMENT | Facility: OTHER | Age: 65
End: 2020-01-07

## 2020-01-07 NOTE — PROGRESS NOTES
1. HealthConnect Verified: yes    2. Verify PCP: yes    3. Review and add  to Care Team: yes    4. Reviewed/Updated the following with patient:       •   Communication Preference Obtained? YES  • MyChart Activation: already active       •   E-Mail Address Obtained? YES       •   Appointment Day and Time Preferences? YES       •   Preferred Pharmacy? YES       •   Preferred Lab? YES    6. Care Gap Scheduling (Attempt to Schedule EACH Overdue Care Gap!)        SCP PA introduction completed/ Pt declined flu shot/ Pt stated that has no questions or concerns at this time.

## 2020-05-20 DIAGNOSIS — Z13.6 SCREENING FOR CARDIOVASCULAR CONDITION: ICD-10-CM

## 2020-05-20 DIAGNOSIS — E03.9 ACQUIRED HYPOTHYROIDISM: ICD-10-CM

## 2020-05-20 DIAGNOSIS — G25.81 RESTLESS LEG SYNDROME: ICD-10-CM

## 2020-06-03 ENCOUNTER — PATIENT OUTREACH (OUTPATIENT)
Dept: HEALTH INFORMATION MANAGEMENT | Facility: OTHER | Age: 65
End: 2020-06-03

## 2020-07-01 ENCOUNTER — HOSPITAL ENCOUNTER (OUTPATIENT)
Dept: LAB | Facility: MEDICAL CENTER | Age: 65
End: 2020-07-01
Attending: NURSE PRACTITIONER
Payer: MEDICARE

## 2020-07-01 DIAGNOSIS — Z13.6 SCREENING FOR CARDIOVASCULAR CONDITION: ICD-10-CM

## 2020-07-01 DIAGNOSIS — E03.9 ACQUIRED HYPOTHYROIDISM: ICD-10-CM

## 2020-07-01 DIAGNOSIS — G25.81 RESTLESS LEG SYNDROME: ICD-10-CM

## 2020-07-01 LAB
ALBUMIN SERPL BCP-MCNC: 4.2 G/DL (ref 3.2–4.9)
ALBUMIN/GLOB SERPL: 1.5 G/DL
ALP SERPL-CCNC: 86 U/L (ref 30–99)
ALT SERPL-CCNC: 23 U/L (ref 2–50)
ANION GAP SERPL CALC-SCNC: 12 MMOL/L (ref 7–16)
AST SERPL-CCNC: 31 U/L (ref 12–45)
BILIRUB SERPL-MCNC: 0.3 MG/DL (ref 0.1–1.5)
BUN SERPL-MCNC: 31 MG/DL (ref 8–22)
CALCIUM SERPL-MCNC: 9.2 MG/DL (ref 8.5–10.5)
CHLORIDE SERPL-SCNC: 106 MMOL/L (ref 96–112)
CHOLEST SERPL-MCNC: 161 MG/DL (ref 100–199)
CO2 SERPL-SCNC: 22 MMOL/L (ref 20–33)
CREAT SERPL-MCNC: 0.86 MG/DL (ref 0.5–1.4)
FASTING STATUS PATIENT QL REPORTED: NORMAL
GLOBULIN SER CALC-MCNC: 2.8 G/DL (ref 1.9–3.5)
GLUCOSE SERPL-MCNC: 95 MG/DL (ref 65–99)
HDLC SERPL-MCNC: 92 MG/DL
LDLC SERPL CALC-MCNC: 61 MG/DL
POTASSIUM SERPL-SCNC: 4.5 MMOL/L (ref 3.6–5.5)
PROT SERPL-MCNC: 7 G/DL (ref 6–8.2)
SODIUM SERPL-SCNC: 140 MMOL/L (ref 135–145)
TRIGL SERPL-MCNC: 40 MG/DL (ref 0–149)
TSH SERPL DL<=0.005 MIU/L-ACNC: 0.88 UIU/ML (ref 0.38–5.33)

## 2020-07-01 PROCEDURE — 80061 LIPID PANEL: CPT

## 2020-07-01 PROCEDURE — 80053 COMPREHEN METABOLIC PANEL: CPT

## 2020-07-01 PROCEDURE — 36415 COLL VENOUS BLD VENIPUNCTURE: CPT

## 2020-07-01 PROCEDURE — 84443 ASSAY THYROID STIM HORMONE: CPT

## 2020-07-02 ENCOUNTER — APPOINTMENT (OUTPATIENT)
Dept: MEDICAL GROUP | Facility: MEDICAL CENTER | Age: 65
End: 2020-07-02
Payer: MEDICARE

## 2020-08-07 ENCOUNTER — APPOINTMENT (OUTPATIENT)
Dept: RADIOLOGY | Facility: MEDICAL CENTER | Age: 65
End: 2020-08-07
Attending: NURSE PRACTITIONER
Payer: MEDICARE

## 2020-08-07 DIAGNOSIS — Z12.31 VISIT FOR SCREENING MAMMOGRAM: ICD-10-CM

## 2020-08-25 ENCOUNTER — APPOINTMENT (OUTPATIENT)
Dept: RADIOLOGY | Facility: MEDICAL CENTER | Age: 65
End: 2020-08-25
Attending: NURSE PRACTITIONER
Payer: MEDICARE

## 2020-08-25 DIAGNOSIS — Z12.31 VISIT FOR SCREENING MAMMOGRAM: ICD-10-CM

## 2020-09-14 ENCOUNTER — HOSPITAL ENCOUNTER (OUTPATIENT)
Dept: RADIOLOGY | Facility: MEDICAL CENTER | Age: 65
End: 2020-09-14
Attending: NURSE PRACTITIONER
Payer: MEDICARE

## 2020-09-14 DIAGNOSIS — Z12.31 VISIT FOR SCREENING MAMMOGRAM: ICD-10-CM

## 2020-09-14 PROCEDURE — 77067 SCR MAMMO BI INCL CAD: CPT

## 2020-10-05 DIAGNOSIS — G25.81 RESTLESS LEG SYNDROME: ICD-10-CM

## 2020-10-05 RX ORDER — PRAMIPEXOLE DIHYDROCHLORIDE 1 MG/1
TABLET ORAL
Qty: 180 TAB | Refills: 2 | Status: CANCELLED | OUTPATIENT
Start: 2020-10-05

## 2020-10-05 NOTE — TELEPHONE ENCOUNTER
Patient not seen since July of last year and warnings have been sent on refills for other medicines that she needs to come in for any further refills.  I therefore cannot refill this medicine without a visit.

## 2020-10-08 DIAGNOSIS — G25.81 RESTLESS LEG SYNDROME: ICD-10-CM

## 2020-10-08 RX ORDER — PRAMIPEXOLE DIHYDROCHLORIDE 1 MG/1
1 TABLET ORAL 2 TIMES DAILY
Qty: 28 TAB | Refills: 0 | Status: SHIPPED | OUTPATIENT
Start: 2020-10-08 | End: 2020-10-20 | Stop reason: SDUPTHER

## 2020-10-08 NOTE — TELEPHONE ENCOUNTER
Patient not seen since July of last year and warnings have been sent with 3 other medication refills that she needs a visit and I do not see she has made an appointment yet.  I will fill this medicine for 14 days but I cannot fill any further medicines without a visit.

## 2020-10-15 ENCOUNTER — PATIENT OUTREACH (OUTPATIENT)
Dept: SCHEDULING | Facility: IMAGING CENTER | Age: 65
End: 2020-10-15

## 2020-10-15 SDOH — ECONOMIC STABILITY: TRANSPORTATION INSECURITY
IN THE PAST 12 MONTHS, HAS LACK OF TRANSPORTATION KEPT YOU FROM MEETINGS, WORK, OR FROM GETTING THINGS NEEDED FOR DAILY LIVING?: NO

## 2020-10-15 SDOH — ECONOMIC STABILITY: FOOD INSECURITY: WITHIN THE PAST 12 MONTHS, YOU WORRIED THAT YOUR FOOD WOULD RUN OUT BEFORE YOU GOT MONEY TO BUY MORE.: NEVER TRUE

## 2020-10-15 SDOH — ECONOMIC STABILITY: FOOD INSECURITY: WITHIN THE PAST 12 MONTHS, THE FOOD YOU BOUGHT JUST DIDN'T LAST AND YOU DIDN'T HAVE MONEY TO GET MORE.: NEVER TRUE

## 2020-10-15 SDOH — ECONOMIC STABILITY: TRANSPORTATION INSECURITY
IN THE PAST 12 MONTHS, HAS THE LACK OF TRANSPORTATION KEPT YOU FROM MEDICAL APPOINTMENTS OR FROM GETTING MEDICATIONS?: NO

## 2020-10-15 NOTE — PROGRESS NOTES
1. Attempt #:1    2. HealthConnect Verified: yes    3. Verify PCP: yes    4. Review Care Team: yes    5. WebIZ Checked & Epic Updated:   · Is patient due for Tdap? NO  · Is patient due for Shingles? NO    6. Reviewed/Updated the following with patient:       •   Communication Preference Obtained? YES       •   Preferred Pharmacy? YES       •   Preferred Lab? YES       •   Family History (document living status of immediate family members and if + hx of cancer, diabetes, hypertension, hyperlipidemia, heart attack, stroke) YES    7. Annual Wellness Visit Scheduling  · Scheduling Status:Scheduled     8. Care Gap Scheduling (Attempt to Schedule EACH Overdue Care Gap!)     Health Maintenance Due   Topic Date Due   • IMM PNEUMOCOCCAL VACCINE: 65+ Years (1 of 1 - PPSV23) 02/24/2018   • Annual Wellness Visit  05/15/2020   • IMM INFLUENZA (1) 09/01/2020        Scheduled patient for Annual Wellness Visit     9. MOGL Activation: already active    10. MOGL Rosibel: no    11. Virtual Visits: no    12. Opt In to Text Messages: yes    13. Patient was advised: “This is a free wellness visit. The provider will screen for medical conditions to help you stay healthy. If you have other concerns to address you may be asked to discuss these at a separate visit or there may be an additional fee.”     14. Patient was informed to arrive 15 min prior to their scheduled appointment and bring in their medication bottles.

## 2020-10-20 ENCOUNTER — OFFICE VISIT (OUTPATIENT)
Dept: MEDICAL GROUP | Facility: MEDICAL CENTER | Age: 65
End: 2020-10-20
Payer: MEDICARE

## 2020-10-20 ENCOUNTER — HOSPITAL ENCOUNTER (OUTPATIENT)
Dept: LAB | Facility: MEDICAL CENTER | Age: 65
End: 2020-10-20
Attending: NURSE PRACTITIONER
Payer: MEDICARE

## 2020-10-20 ENCOUNTER — HOSPITAL ENCOUNTER (OUTPATIENT)
Dept: RADIOLOGY | Facility: MEDICAL CENTER | Age: 65
End: 2020-10-20
Attending: NURSE PRACTITIONER
Payer: MEDICARE

## 2020-10-20 VITALS
WEIGHT: 146.7 LBS | OXYGEN SATURATION: 96 % | DIASTOLIC BLOOD PRESSURE: 68 MMHG | HEART RATE: 68 BPM | SYSTOLIC BLOOD PRESSURE: 112 MMHG | BODY MASS INDEX: 23.58 KG/M2 | TEMPERATURE: 97.6 F | HEIGHT: 66 IN

## 2020-10-20 DIAGNOSIS — F41.1 GAD (GENERALIZED ANXIETY DISORDER): ICD-10-CM

## 2020-10-20 DIAGNOSIS — Z86.69 HISTORY OF SEIZURE DISORDER: ICD-10-CM

## 2020-10-20 DIAGNOSIS — M46.1 SACROILIITIS, NOT ELSEWHERE CLASSIFIED (HCC): ICD-10-CM

## 2020-10-20 DIAGNOSIS — M25.50 ARTHRALGIA OF MULTIPLE JOINTS: ICD-10-CM

## 2020-10-20 DIAGNOSIS — Z00.00 MEDICARE ANNUAL WELLNESS VISIT, SUBSEQUENT: ICD-10-CM

## 2020-10-20 DIAGNOSIS — G25.81 RESTLESS LEG SYNDROME: ICD-10-CM

## 2020-10-20 DIAGNOSIS — E03.9 ACQUIRED HYPOTHYROIDISM: ICD-10-CM

## 2020-10-20 DIAGNOSIS — M81.0 AGE-RELATED OSTEOPOROSIS WITHOUT CURRENT PATHOLOGICAL FRACTURE: ICD-10-CM

## 2020-10-20 DIAGNOSIS — N39.3 STRESS INCONTINENCE OF URINE: ICD-10-CM

## 2020-10-20 DIAGNOSIS — G47.9 SLEEP DIFFICULTIES: ICD-10-CM

## 2020-10-20 DIAGNOSIS — M19.90 ARTHRITIS: ICD-10-CM

## 2020-10-20 LAB
APPEARANCE UR: ABNORMAL
BACTERIA #/AREA URNS HPF: NEGATIVE /HPF
BILIRUB UR QL STRIP.AUTO: NEGATIVE
COLOR UR: YELLOW
CRP SERPL HS-MCNC: 1.5 MG/L (ref 0–7.5)
EPI CELLS #/AREA URNS HPF: NEGATIVE /HPF
ERYTHROCYTE [SEDIMENTATION RATE] IN BLOOD BY WESTERGREN METHOD: 3 MM/HOUR (ref 0–30)
GLUCOSE UR STRIP.AUTO-MCNC: NEGATIVE MG/DL
HYALINE CASTS #/AREA URNS LPF: NORMAL /LPF
KETONES UR STRIP.AUTO-MCNC: NEGATIVE MG/DL
LEUKOCYTE ESTERASE UR QL STRIP.AUTO: NEGATIVE
MICRO URNS: ABNORMAL
NITRITE UR QL STRIP.AUTO: NEGATIVE
PH UR STRIP.AUTO: 7 [PH] (ref 5–8)
PROT UR QL STRIP: NEGATIVE MG/DL
RBC # URNS HPF: NORMAL /HPF
RBC UR QL AUTO: NEGATIVE
RHEUMATOID FACT SER IA-ACNC: 10 IU/ML (ref 0–14)
SP GR UR STRIP.AUTO: 1.02
UROBILINOGEN UR STRIP.AUTO-MCNC: 0.2 MG/DL
WBC #/AREA URNS HPF: NORMAL /HPF

## 2020-10-20 PROCEDURE — 85652 RBC SED RATE AUTOMATED: CPT

## 2020-10-20 PROCEDURE — 86431 RHEUMATOID FACTOR QUANT: CPT

## 2020-10-20 PROCEDURE — 36415 COLL VENOUS BLD VENIPUNCTURE: CPT

## 2020-10-20 PROCEDURE — 99213 OFFICE O/P EST LOW 20 MIN: CPT | Mod: 25 | Performed by: NURSE PRACTITIONER

## 2020-10-20 PROCEDURE — 86200 CCP ANTIBODY: CPT

## 2020-10-20 PROCEDURE — 86141 C-REACTIVE PROTEIN HS: CPT

## 2020-10-20 PROCEDURE — 77077 JOINT SURVEY SINGLE VIEW: CPT

## 2020-10-20 PROCEDURE — 81001 URINALYSIS AUTO W/SCOPE: CPT

## 2020-10-20 PROCEDURE — G0439 PPPS, SUBSEQ VISIT: HCPCS | Performed by: NURSE PRACTITIONER

## 2020-10-20 RX ORDER — LEVOTHYROXINE SODIUM 0.05 MG/1
50 TABLET ORAL
Qty: 90 TAB | Refills: 3 | Status: SHIPPED | OUTPATIENT
Start: 2020-10-20 | End: 2021-07-21 | Stop reason: SDUPTHER

## 2020-10-20 RX ORDER — MAGNESIUM GLUCONATE 27 MG(500)
500 TABLET ORAL 3 TIMES DAILY
COMMUNITY
End: 2023-01-01

## 2020-10-20 RX ORDER — GABAPENTIN 100 MG/1
100 CAPSULE ORAL 3 TIMES DAILY
Qty: 270 CAP | Refills: 3 | Status: SHIPPED | OUTPATIENT
Start: 2020-10-20 | End: 2021-07-21 | Stop reason: SDUPTHER

## 2020-10-20 RX ORDER — PRAMIPEXOLE DIHYDROCHLORIDE 1 MG/1
1 TABLET ORAL 2 TIMES DAILY
Qty: 180 TAB | Refills: 3 | Status: SHIPPED | OUTPATIENT
Start: 2020-10-20 | End: 2021-07-21 | Stop reason: SDUPTHER

## 2020-10-20 RX ORDER — TRAZODONE HYDROCHLORIDE 50 MG/1
TABLET ORAL
Qty: 90 TAB | Refills: 3 | Status: SHIPPED | OUTPATIENT
Start: 2020-10-20 | End: 2021-07-21 | Stop reason: SDUPTHER

## 2020-10-20 RX ORDER — HYDROCODONE BITARTRATE AND ACETAMINOPHEN 10; 325 MG/1; MG/1
1 TABLET ORAL EVERY 6 HOURS PRN
COMMUNITY
End: 2020-11-09

## 2020-10-20 ASSESSMENT — ACTIVITIES OF DAILY LIVING (ADL): BATHING_REQUIRES_ASSISTANCE: 0

## 2020-10-20 ASSESSMENT — PATIENT HEALTH QUESTIONNAIRE - PHQ9
5. POOR APPETITE OR OVEREATING: 0 - NOT AT ALL
SUM OF ALL RESPONSES TO PHQ QUESTIONS 1-9: 2
CLINICAL INTERPRETATION OF PHQ2 SCORE: 2

## 2020-10-20 ASSESSMENT — ENCOUNTER SYMPTOMS: GENERAL WELL-BEING: GOOD

## 2020-10-20 NOTE — PROGRESS NOTES
Chief Complaint   Patient presents with   • Annual Wellness Visit         HPI:  Malaika is a 65 y.o. here for Medicare Annual Wellness Visit as well as problems with worsening hand pain, stress incontinence of urine, anxiety, and osteoporosis treatment    1. Medicare annual wellness visit, subsequent  Screening performed below.    2. Stress incontinence of urine  Patient reports over the past year she has been having more issues with stress incontinence.  She states when she sneezes or coughs she will wet herself.  She has been having to use incontinence briefs.  She denies dysuria, hematuria or frequency.    3. Age-related osteoporosis without current pathological fracture  Patient has history of osteoporosis based on a bone density scan from 2018 showing osteoporosis with high risk of fracture.  She was placed on Fosamax but only took it for about 3 months because she had GI upset.  She has not been in for follow-up in a while.    4. Arthralgia of multiple joints  Patient states for a few years now she has been having bilateral hand joint pain.  She had lab work-up when she was going to the [facility] in 2017 but apparently she was rheumatoid negative.  Joint survey in 2018 showed multifocal diffuse osteoarthritis in both hands but no definite erosions.  She states her hand swelling and deformities are getting worse.  She would like to see a rheumatologist    5. YOSSI (generalized anxiety disorder)  Patient states she has been having more issues with anxiety over the last year and wonders if there is something she can take for this.  She is already on Norco through pain management.  She is not on an SSRI although she states she was on one in the past with limited success.    6. Acquired hypothyroidism  Most recent TSH therapeutic on current dose of levothyroxine    7. Restless leg syndrome  Patient using Mirapex 1 mg twice a day for years and states this is helpful.    8. Sacroiliitis, not elsewhere classified  ([facility])  Patient goes to pain management which is prescribing her Norco 3 times daily but she is also on gabapentin and trazodone.    9. History of seizure disorder  Patient reports she has not had a seizure since 2013 and was discharged from neurology    10. Arthritis  Patient continues to have arthritis type symptoms, mostly of the hands.    11. Sleep difficulties  Patient would like refills on her trazodone which he uses for sleep.  Patient Active Problem List    Diagnosis Date Noted   • History of seizure disorder 05/15/2019   • Sacroiliitis, not elsewhere classified (HCC) 03/11/2019   • Age-related osteoporosis without current pathological fracture 08/27/2018   • Arthralgia of multiple joints 05/09/2018   • Acquired hypothyroidism 12/04/2017   • Restless leg syndrome 12/04/2017       Current Outpatient Medications   Medication Sig Dispense Refill   • magnesium gluconate (MAG-G) 500 MG tablet Take 500 mg by mouth 3 times a day.     • pramipexole (MIRAPEX) 1 MG Tab Take 1 Tab by mouth 2 times a day. TAKE 1 TABLET BY MOUTH TWICE A DAY 28 Tab 0   • levothyroxine (SYNTHROID) 50 MCG Tab TAKE 1 TAB BY MOUTH EVERY MORNING ON AN EMPTY STOMACH. NEEDS APPT 14 Tab 0   • gabapentin (NEURONTIN) 100 MG Cap TAKE 1 CAPSULE BY MOUTH THREE TIMES A DAY 90 Cap 0   • traZODone (DESYREL) 50 MG Tab TAKE 1 TABLET BY MOUTH EVERY DAY AT BEDTIME AS NEEDED FOR SLEEP 14 Tab 0   • HYDROcodone-acetaminophen (NORCO) 7.5-325 MG per tablet Take 1-2 Tabs by mouth every 6 hours as needed.     • Multiple Vitamins-Minerals (MULTIVITAMIN ADULT PO) Take  by mouth.     • Cholecalciferol (VITAMIN D3) 1000 units Cap Take  by mouth.     • bumetanide (BUMEX) 1 MG Tab Take 1 Tab by mouth every day. 30 Tab 2   • alendronate (FOSAMAX) 70 MG Tab Take 1 Tab by mouth every 7 days. (Patient not taking: Reported on 10/20/2020) 4 Tab      No current facility-administered medications for this visit.         Patient is taking medications as noted in medication  list.  Current supplements as per medication list.     Allergies: Keflex and Sulfa drugs    Current social contact/activities:  PT states she lives with her fiance and they are looking to get a dog.    Is patient current with immunizations? No, due for FLU and PNEUMOVAX (PPSV23). Patient is interested in receiving NONE today.    She  reports that she has been smoking cigarettes. She has a 7.50 pack-year smoking history. She has never used smokeless tobacco. She reports current alcohol use of about 1.8 oz of alcohol per week. She reports current drug use. Drug: Marijuana.  Ready to quit: Not Answered  Counseling given: Not Answered        DPA/Advanced directive: Patient has Advanced Directive on file.     ROS:    Gait: Uses no assistive device   Ostomy: No   Other tubes: No   Amputations: No   Chronic oxygen use No   Last eye exam - 3 years ago  Wears hearing aids: No   : Reports urinary leakage during the last 6 months that has somewhat interfered with their daily activities or sleep.        Depression Screening    Little interest or pleasure in doing things?  1 - several days  Feeling down, depressed, or hopeless? 1 - several days  Patient Health Questionnaire Score: 2    If depressive symptoms identified deferred to follow up visit unless specifically addressed in assessment and plan.    Interpretation of PHQ-9 Total Score   Score Severity   1-4 No Depression   5-9 Mild Depression   10-14 Moderate Depression   15-19 Moderately Severe Depression   20-27 Severe Depression    Screening for Cognitive Impairment    Three Minute Recall (river, nation, finger)  3/3    Rich clock face with all 12 numbers and set the hands to show 10 past 11.  Yes 5/5  If cognitive concerns identified, deferred for follow up unless specifically addressed in assessment and plan.    Fall Risk Assessment    Has the patient had two or more falls in the last year or any fall with injury in the last year?  No  If fall risk identified, deferred  for follow up unless specifically addressed in assessment and plan.    Safety Assessment    Throw rugs on floor.  No  Handrails on all stairs.  Yes  Good lighting in all hallways.  Yes  Difficulty hearing.  No  Patient counseled about all safety risks that were identified.    Functional Assessment ADLs    Are there any barriers preventing you from cooking for yourself or meeting nutritional needs?  No.   Are there any barriers preventing you from driving safely or obtaining transportation?  No.   Are there any barriers preventing you from using a telephone or calling for help?  No.   Are there any barriers preventing you from shopping?  No.    Are there any barriers preventing you from taking care of your own finances?  No.   Are there any barriers preveting you from managing your medications?  No.   Are there any barriers preventing you from showering, bathing or dressing yourself?  No.    Are you currently engaging in any exercise or physical activity?  Yes.  Yard work  What is your perception of your health?  Good.    Health Maintenance Summary                IMM PNEUMOCOCCAL VACCINE: 65+ Years Overdue 2/24/2018     Annual Wellness Visit Overdue 5/15/2020      Done 5/15/2019 SUBSEQUENT ANNUAL WELLNESS VISIT-INCLUDES PPPS ()     Patient has more history with this topic...    IMM INFLUENZA Overdue 9/1/2020     MAMMOGRAM Next Due 9/14/2021      Done 9/14/2020 MA-SCREENING MAMMO BILAT W/TOMOSYNTHESIS W/CAD     Patient has more history with this topic...    COLONOSCOPY Next Due 4/8/2022      Done 4/8/2019 REFERRAL TO GI FOR COLONOSCOPY    BONE DENSITY Next Due 6/20/2023      Done 6/20/2018 DS-BONE DENSITY STUDY (DEXA)    IMM DTaP/Tdap/Td Vaccine Next Due 7/1/2029      Done 7/1/2019 Imm Admin: Tdap Vaccine          Patient Care Team:  SAVAGE Luna as PCP - General (Family Medicine)  Spine Milindada (Inactive)  Tashia Schaefer as      Social History     Tobacco Use   • Smoking status:  "Current Every Day Smoker     Packs/day: 0.50     Years: 15.00     Pack years: 7.50     Types: Cigarettes   • Smokeless tobacco: Never Used   Substance Use Topics   • Alcohol use: Yes     Alcohol/week: 1.8 oz     Types: 3 Standard drinks or equivalent per week     Comment: about drinks, wine or mixed drink    • Drug use: Yes     Types: Marijuana     Comment: for pain     Family History   Problem Relation Age of Onset   • Stroke Mother    • Cancer Mother    • Hypertension Mother    • Cancer Maternal Aunt    • Stroke Maternal Grandmother    • Hypertension Maternal Grandmother    • Cancer Paternal Grandfather      She  has a past medical history of Arthritis, RLS (restless legs syndrome), and Seizure (HCC) (08/2013).   Past Surgical History:   Procedure Laterality Date   • THYROIDECTOMY  1994    partial           Exam:     /68 (BP Location: Right arm, Patient Position: Sitting, BP Cuff Size: Adult)   Pulse 68   Temp 36.4 °C (97.6 °F) (Temporal)   Ht 1.676 m (5' 6\")   Wt 66.5 kg (146 lb 11.2 oz)   SpO2 96%  Body mass index is 23.68 kg/m².    Hearing good.    Dentition fair  Alert, oriented in no acute distress.  Eye contact is good, speech goal directed, affect calm  HEENT: TMs show no erythema, neck supple negative lymphadenopathy or thyromegaly  Chest: Clear bilaterally to A&P without wheeze or rhonchi.  Heart: Regular rate and rhythm without murmur  Musculoskeletal: Swelling deformity of the hand joints bilaterally.  Skin: Some scarring of the skin on the neck and arms.  No definitive rash.    Assessment and Plan. The following treatment and monitoring plan is recommended:          1. Medicare annual wellness visit, subsequent  Annual wellness topics discussed, review of chronic medical problems completed    - Subsequent Annual Wellness Visit - Includes PPPS ()    2. Stress incontinence of urine  I will check a urine but this sounds like a long-term problem and she will be referred to urology  - " REFERRAL TO UROLOGY  - URINALYSIS,CULTURE IF INDICATED; Future    3. Age-related osteoporosis without current pathological fracture  Patient could not tolerate Fosamax so we will send a referral to the infusion center and have her start on Prolia.  She will continue with calcium and vitamin D  - denosumab (PROLIA) 60 MG/ML Solution Prefilled Syringe; Inject 1 mL as instructed Once for 1 dose. Inject 1 mL as instructed once for 1 dose.  Inject 1 mL as instructed every 6 months for 3 doses.  Inject subcutaneously.  Time signed 1:52 PM.  Start date 10/20/20.  Dispense: 1.8 mL; Refill: 11    4. Arthralgia of multiple joints  Patient showing worsening deformities of the hands bilaterally so I will have her do a new x-ray of the hands as well as rheumatoid work-up and refer her to rheumatology.  - DX-JOINT SURVEY-HANDS SINGLE VIEW; Future  - RHEUMATOID ARTHRITIS FACTOR; Future  - CCP ANTIBODY; Future  - Sed Rate; Future  - CRP HIGH SENSITIVE (CARDIAC); Future  - REFERRAL TO RHEUMATOLOGY    5. YOSSI (generalized anxiety disorder)  Anxiety teaching included:    Discussed in depth with patient the pro's and con's of antidepressant therapy. I explained that it may take 2-4 wekks for the medication to take effect. Side effects discussed. Some people can have increased depression on these medications. If you should develope any homicidal or suicidal thoughts, you need to go to the emergency room immediatly for evaluation and possible admission. Otherwise I would like to see you back in two weeks to evaluate treatment success.    You should also start or continue counseling while on medication because antidepressents are a adjunct to treatment, not a substitute.  - sertraline (ZOLOFT) 50 MG Tab; Take 1 Tab by mouth every day.  Dispense: 30 Tab; Refill: 11    6. Acquired hypothyroidism    - levothyroxine (SYNTHROID) 50 MCG Tab; Take 1 Tab by mouth Every morning on an empty stomach.  Dispense: 90 Tab; Refill: 3    7. Restless leg  syndrome    - pramipexole (MIRAPEX) 1 MG Tab; Take 1 Tab by mouth 2 times a day. TAKE 1 TABLET BY MOUTH TWICE A DAY  Dispense: 180 Tab; Refill: 3    8. Sacroiliitis, not elsewhere classified (HCC)  Patient will continue to follow with pain management which is treating this.    9. History of seizure disorder  No reports of recent seizures    10. Arthritis  Patient feels the gabapentin has been helpful  - gabapentin (NEURONTIN) 100 MG Cap; Take 1 Cap by mouth 3 times a day.  Dispense: 270 Cap; Refill: 3    11. Sleep difficulties    - traZODone (DESYREL) 50 MG Tab; TAKE 1 TABLET BY MOUTH EVERY DAY AT BEDTIME AS NEEDED FOR SLEEP  Dispense: 90 Tab; Refill: 3    Services suggested: No services needed at this time  Health Care Screening recommendations as per orders if indicated.  Referrals offered: PT/OT/Nutrition counseling/Behavioral Health/Smoking cessation as per orders if indicated.    Discussion today about general wellness and lifestyle habits:    · Prevent falls and reduce trip hazards; Cautioned about securing or removing rugs.  · Have a working fire alarm and carbon monoxide detector;   · Engage in regular physical activity and social activities       Follow-up: No follow-ups on file.

## 2020-10-22 LAB — CCP IGG SERPL-ACNC: 10 UNITS (ref 0–19)

## 2020-11-09 DIAGNOSIS — F41.1 GAD (GENERALIZED ANXIETY DISORDER): ICD-10-CM

## 2020-11-23 ENCOUNTER — TELEPHONE (OUTPATIENT)
Dept: ONCOLOGY | Facility: MEDICAL CENTER | Age: 65
End: 2020-11-23

## 2020-11-23 NOTE — TELEPHONE ENCOUNTER
Patient unavailable to answer COVID-19 screening questions at this time. Left voicemail. Provided number for  along with education regarding updated check-in process.

## 2020-11-24 ENCOUNTER — HOSPITAL ENCOUNTER (OUTPATIENT)
Facility: MEDICAL CENTER | Age: 65
End: 2020-11-24
Attending: PHYSICIAN ASSISTANT
Payer: MEDICARE

## 2020-11-24 ENCOUNTER — OUTPATIENT INFUSION SERVICES (OUTPATIENT)
Dept: ONCOLOGY | Facility: MEDICAL CENTER | Age: 65
End: 2020-11-24
Attending: NURSE PRACTITIONER
Payer: MEDICARE

## 2020-11-24 VITALS
OXYGEN SATURATION: 98 % | WEIGHT: 151.46 LBS | SYSTOLIC BLOOD PRESSURE: 122 MMHG | TEMPERATURE: 97.2 F | DIASTOLIC BLOOD PRESSURE: 71 MMHG | HEART RATE: 72 BPM | RESPIRATION RATE: 18 BRPM | BODY MASS INDEX: 25.86 KG/M2 | HEIGHT: 64 IN

## 2020-11-24 DIAGNOSIS — M81.0 AGE-RELATED OSTEOPOROSIS WITHOUT CURRENT PATHOLOGICAL FRACTURE: ICD-10-CM

## 2020-11-24 LAB
CA-I BLD ISE-SCNC: 1.04 MMOL/L (ref 1.1–1.3)
CREAT BLD-MCNC: 0.8 MG/DL (ref 0.5–1.4)

## 2020-11-24 PROCEDURE — 96372 THER/PROPH/DIAG INJ SC/IM: CPT

## 2020-11-24 PROCEDURE — 700111 HCHG RX REV CODE 636 W/ 250 OVERRIDE (IP): Mod: JG | Performed by: NURSE PRACTITIONER

## 2020-11-24 PROCEDURE — 36415 COLL VENOUS BLD VENIPUNCTURE: CPT

## 2020-11-24 PROCEDURE — 82565 ASSAY OF CREATININE: CPT

## 2020-11-24 PROCEDURE — 87086 URINE CULTURE/COLONY COUNT: CPT

## 2020-11-24 PROCEDURE — 82330 ASSAY OF CALCIUM: CPT

## 2020-11-24 RX ADMIN — DENOSUMAB 60 MG: 60 INJECTION SUBCUTANEOUS at 14:17

## 2020-11-24 ASSESSMENT — PAIN DESCRIPTION - PAIN TYPE: TYPE: CHRONIC PAIN

## 2020-11-24 NOTE — LETTER
Infusion Services   [address] [place] [address]  ASHLEY Garza [address]  Phone: [phone]  Fax: [phone]              Dear Dr. Marcos,    Your patient, Malaika Garcia (: 1955), was scheduled at [facility].  Malaika's encounter diagnosis is:  1. Age-related osteoporosis without current pathological fracture  Nursing Communication    Nursing Communication    Nursing Communication    Calcium monitoring per protocol    denosumab (PROLIA) injection 60 mg    Nursing Communication    Nursing Communication    Nursing Communication    Calcium monitoring per protocol    ISTAT CREATININE    ISTAT IONIZED CA    ISTAT CREATININE    ISTAT IONIZED CA     She arrived for her appointment, and  the scheduled treatment was   given. These medications were administered to the patient: We administered denosumab..  Malaika Garcia  tolerated treatment. In addition, the following labs were drawn    Recent Results (from the past 24 hour(s))   ISTAT CREATININE    Collection Time: 20  2:02 PM   Result Value Ref Range    Istat Creatinine 0.8 0.5 - 1.4 mg/dL   ISTAT IONIZED CA    Collection Time: 20  2:02 PM   Result Value Ref Range    Istat Ionized Calcium 1.04 (L) 1.10 - 1.30 mmol/L            Her next appointment is rescheduled for 21.    For more information, you may review the nurse's progress notes in chart review under the notes section.       Sincerely,  Infusion Services

## 2020-11-24 NOTE — PROGRESS NOTES
Prolia administered per MD orders to L back of arm via subcutaneous injection.  Pt tolerated injection well and without incident. Band aid applied to injection site.  Pt observed for approximately 10 minutes for first dose.  No adverse effects observed or expressed.  Pt left infusion services in no apparent distress after completion of treatment, ambulatory.  Next appointment scheduled.

## 2020-11-24 NOTE — PROGRESS NOTES
Pt to infusion services ambulatory per self.  Pt here for scheduled Prolia injection.  Plan of care reviewed.  Pt verbalizes understanding.  Pt denies any s/sx of infection today.  Medication and possible side effects reviewed; medication handout provided.  Pt denies any recent or upcoming dental procedures or oral surgery.  Pt reports taking Calcium 1000 mg PO daily and Vitamin D 1000 I.U. PO daily.  Lab drawn from R-AC using #25G BD needle.  Pt tolerated well.  Pressure dressing applied.  Blood sample to lab for iSTAT calcium and creatinine.  Ionized calcium = 1.04.  Pt asymptomatic and denies any s/sx of hypocalcemia today.  Chart to pharmacy.

## 2020-11-27 LAB
BACTERIA UR CULT: NORMAL
SIGNIFICANT IND 70042: NORMAL
SITE SITE: NORMAL
SOURCE SOURCE: NORMAL

## 2021-03-03 DIAGNOSIS — Z23 NEED FOR VACCINATION: ICD-10-CM

## 2021-05-26 ENCOUNTER — OUTPATIENT INFUSION SERVICES (OUTPATIENT)
Dept: ONCOLOGY | Facility: MEDICAL CENTER | Age: 66
End: 2021-05-26
Attending: NURSE PRACTITIONER
Payer: MEDICARE

## 2021-05-26 VITALS
HEART RATE: 70 BPM | RESPIRATION RATE: 18 BRPM | SYSTOLIC BLOOD PRESSURE: 112 MMHG | WEIGHT: 157.19 LBS | HEIGHT: 65 IN | TEMPERATURE: 97.8 F | OXYGEN SATURATION: 95 % | DIASTOLIC BLOOD PRESSURE: 65 MMHG | BODY MASS INDEX: 26.19 KG/M2

## 2021-05-26 DIAGNOSIS — M81.0 AGE-RELATED OSTEOPOROSIS WITHOUT CURRENT PATHOLOGICAL FRACTURE: ICD-10-CM

## 2021-05-26 LAB
CA-I BLD ISE-SCNC: 1.16 MMOL/L (ref 1.1–1.3)
CREAT BLD-MCNC: 1 MG/DL (ref 0.5–1.4)

## 2021-05-26 PROCEDURE — 36415 COLL VENOUS BLD VENIPUNCTURE: CPT

## 2021-05-26 PROCEDURE — 82565 ASSAY OF CREATININE: CPT

## 2021-05-26 PROCEDURE — 82330 ASSAY OF CALCIUM: CPT

## 2021-05-26 PROCEDURE — 700111 HCHG RX REV CODE 636 W/ 250 OVERRIDE (IP): Mod: JG | Performed by: NURSE PRACTITIONER

## 2021-05-26 PROCEDURE — 96372 THER/PROPH/DIAG INJ SC/IM: CPT

## 2021-05-26 RX ADMIN — DENOSUMAB 60 MG: 60 INJECTION SUBCUTANEOUS at 14:37

## 2021-05-26 NOTE — PROGRESS NOTES
Pt arrives to \[facility]\"" for Prolia.  Pt denies any s/sx of infection or recent/planned dental procedures.  ISTAT calcium and creatinine drawn via 23g butterfly needle to R-AC.  Labs reviewed and ok to give Prolia per pharmacy.  Prolia given SC to back of [facility].  Scheduled pt for next appt in November.  Informed pt of appt time.  Pt dc home to self care.

## 2021-07-21 ENCOUNTER — OFFICE VISIT (OUTPATIENT)
Dept: MEDICAL GROUP | Facility: MEDICAL CENTER | Age: 66
End: 2021-07-21
Payer: MEDICARE

## 2021-07-21 VITALS
HEART RATE: 73 BPM | TEMPERATURE: 97.6 F | RESPIRATION RATE: 16 BRPM | OXYGEN SATURATION: 98 % | BODY MASS INDEX: 24.79 KG/M2 | DIASTOLIC BLOOD PRESSURE: 56 MMHG | SYSTOLIC BLOOD PRESSURE: 115 MMHG | WEIGHT: 147 LBS

## 2021-07-21 DIAGNOSIS — E03.9 ACQUIRED HYPOTHYROIDISM: ICD-10-CM

## 2021-07-21 DIAGNOSIS — G25.81 RESTLESS LEG SYNDROME: ICD-10-CM

## 2021-07-21 DIAGNOSIS — G47.9 SLEEP DIFFICULTIES: ICD-10-CM

## 2021-07-21 DIAGNOSIS — Z00.00 ENCOUNTER FOR MEDICARE ANNUAL WELLNESS EXAM: ICD-10-CM

## 2021-07-21 DIAGNOSIS — M46.1 SACROILIITIS, NOT ELSEWHERE CLASSIFIED (HCC): ICD-10-CM

## 2021-07-21 DIAGNOSIS — M81.0 AGE-RELATED OSTEOPOROSIS WITHOUT CURRENT PATHOLOGICAL FRACTURE: ICD-10-CM

## 2021-07-21 DIAGNOSIS — M19.90 ARTHRITIS: ICD-10-CM

## 2021-07-21 DIAGNOSIS — F41.1 GAD (GENERALIZED ANXIETY DISORDER): ICD-10-CM

## 2021-07-21 DIAGNOSIS — Z72.0 TOBACCO ABUSE: ICD-10-CM

## 2021-07-21 DIAGNOSIS — M25.50 ARTHRALGIA OF MULTIPLE JOINTS: ICD-10-CM

## 2021-07-21 PROCEDURE — 99214 OFFICE O/P EST MOD 30 MIN: CPT | Performed by: STUDENT IN AN ORGANIZED HEALTH CARE EDUCATION/TRAINING PROGRAM

## 2021-07-21 RX ORDER — PRAMIPEXOLE DIHYDROCHLORIDE 1 MG/1
1 TABLET ORAL 2 TIMES DAILY
Qty: 180 TABLET | Refills: 3 | Status: SHIPPED | OUTPATIENT
Start: 2021-07-21 | End: 2022-08-01 | Stop reason: SDUPTHER

## 2021-07-21 RX ORDER — LEVOTHYROXINE SODIUM 0.05 MG/1
50 TABLET ORAL
Qty: 90 TABLET | Refills: 3 | Status: SHIPPED | OUTPATIENT
Start: 2021-07-21 | End: 2022-08-29

## 2021-07-21 RX ORDER — TRAZODONE HYDROCHLORIDE 50 MG/1
TABLET ORAL
Qty: 90 TABLET | Refills: 3 | Status: SHIPPED | OUTPATIENT
Start: 2021-07-21 | End: 2022-08-01 | Stop reason: SDUPTHER

## 2021-07-21 RX ORDER — GABAPENTIN 100 MG/1
100 CAPSULE ORAL 3 TIMES DAILY
Qty: 270 CAPSULE | Refills: 3 | Status: SHIPPED | OUTPATIENT
Start: 2021-07-21 | End: 2022-05-10

## 2021-07-21 ASSESSMENT — PATIENT HEALTH QUESTIONNAIRE - PHQ9: CLINICAL INTERPRETATION OF PHQ2 SCORE: 0

## 2021-07-21 ASSESSMENT — ACTIVITIES OF DAILY LIVING (ADL): BATHING_REQUIRES_ASSISTANCE: 0

## 2021-07-21 ASSESSMENT — ENCOUNTER SYMPTOMS: GENERAL WELL-BEING: GOOD

## 2021-07-21 NOTE — PROGRESS NOTES
Chief Complaint   Patient presents with   • Annual Exam         HPI:  Malaika is a 66 y.o. here for Medicare Annual Wellness Visit    Medicare annual wellness visit, subsequent  Screening performed below.     Stress incontinence of urine  Patient continues to have stress incontinence.  Patient is following with urology [place] and working on solutions for this problem.  Patient continues to wear briefs.     Age-related osteoporosis without current pathological fracture  Patient has history of osteoporosis based on a bone density scan from 2018 showing osteoporosis with high risk of fracture.  Patient previously using Fosamax but stopped due to GI upset.  Patient has been seeing infusion clinic for Prolia injections.  Patient continues on calcium and vitamin D supplement.     Arthralgia of multiple joints  Patient states for a few years now she has been having bilateral hand joint pain.    Patient with lab work-up and joint survey last year.  Patient previously referred to rheumatology but was unable to follow-up Due to Covid and other life events.  She had lab work-up when she was going to the [facility] in 2017 but apparently she was rheumatoid negative.       YOSSI (generalized anxiety disorder)  Patient was started on sertraline 50 mg last year for anxiety.  Patient was previously on Norco's but is no longer taking these.  Patient has increased sertraline dose to 75 mg daily and states that she feels well maintained on this dose.  Patient denies any problems with this medication.       Acquired hypothyroidism  Most recent TSH therapeutic on current dose of levothyroxine     Restless leg syndrome  Patient using Mirapex 1 mg twice a day for years and states this is helpful.  Patient states that she needs trazodone in order to sleep because if she lays there awake the restless leg syndrome increases and makes it impossible to sleep.     Sacroiliitis, not elsewhere classified (HCC)  Patient goes to pain management  which is prescribing her Norco 3 times daily but she is also on gabapentin and trazodone.     History of seizure disorder  Patient reports she has not had a seizure since 2013 and was discharged from neurology     Arthritis  Patient continues to have arthritis type symptoms, mostly of the hands.     Sleep difficulties  Patient would like refills on her trazodone which shee continues to use for sleep    Tobacco abuse  Patient quit smoking in February 2021.  Patient states that she has not smoked since then.  Patient used patch to quit and stated it took for less than 6 weeks to completely stop smoking cigarettes.        Patient Active Problem List    Diagnosis Date Noted   • Tobacco abuse 07/21/2021   • History of seizure disorder 05/15/2019   • Sacroiliitis, not elsewhere classified (HCC) 03/11/2019   • Age-related osteoporosis without current pathological fracture 08/27/2018   • Arthralgia of multiple joints 05/09/2018   • Acquired hypothyroidism 12/04/2017   • Restless leg syndrome 12/04/2017       Current Outpatient Medications   Medication Sig Dispense Refill   • levothyroxine (SYNTHROID) 50 MCG Tab Take 1 tablet by mouth every morning on an empty stomach. 90 tablet 3   • gabapentin (NEURONTIN) 100 MG Cap Take 1 capsule by mouth 3 times a day. 270 capsule 3   • sertraline (ZOLOFT) 50 MG Tab Take 1.5 Tablets by mouth every day. 135 tablet 3   • traZODone (DESYREL) 50 MG Tab TAKE 1 TABLET BY MOUTH EVERY DAY AT BEDTIME AS NEEDED FOR SLEEP 90 tablet 3   • pramipexole (MIRAPEX) 1 MG Tab Take 1 tablet by mouth 2 times a day. TAKE 1 TABLET BY MOUTH TWICE A  tablet 3   • CALCIUM PO Take  by mouth.     • magnesium gluconate (MAG-G) 500 MG tablet Take 500 mg by mouth 3 times a day.     • Multiple Vitamins-Minerals (MULTIVITAMIN ADULT PO) Take  by mouth.     • Cholecalciferol (VITAMIN D3) 1000 units Cap Take  by mouth.     • bumetanide (BUMEX) 1 MG Tab Take 1 Tab by mouth every day. 30 Tab 2     No current  facility-administered medications for this visit.    Annual Health Assessment Questions:    1.  Are you currently engaging in any exercise or physical activity? No    2.  How would you describe your mood or emotional well-being today? good    3.  Have you had any falls in the last year? No    4.  Have you noticed any problems with your balance or had difficulty walking? Yes    5.  In the last six months have you experienced any leakage of urine? No    6. DPA/Advanced Directive: Patient has Durable Power of  on file.     Patient is taking medications as noted in medication list.  Current supplements as per medication list.     Allergies: Keflex and Sulfa drugs    Current social contact/activities: camping, motorcycle, lakes    Is patient current with immunizations? Yes.    She  reports that she has quit smoking. Her smoking use included cigarettes. She quit after 15.00 years of use. She has never used smokeless tobacco. She reports current alcohol use of about 1.8 oz of alcohol per week. She reports current drug use. Drug: Marijuana.  Counseling given: Not Answered  Comment: quit 4 months        DPA/Advanced directive: Patient has Durable Power of  on file.     ROS:    Gait: Uses no assistive device   Ostomy: No   Other tubes: No   Amputations: No   Chronic oxygen use No   Last eye exam    Wears hearing aids: No   : Reports urinary leakage during the last 6 months that has somewhat interfered with their daily activities or sleep.      Screening:    Depression Screening    Little interest or pleasure in doing things?  0 - not at all  Feeling down, depressed, or hopeless? 0 - not at all  Patient Health Questionnaire Score: 0    If depressive symptoms identified deferred to follow up visit unless specifically addressed in assessment and plan.    Interpretation of PHQ-9 Total Score   Score Severity   1-4 No Depression   5-9 Mild Depression   10-14 Moderate Depression   15-19 Moderately Severe Depression    20-27 Severe Depression    Screening for Cognitive Impairment    Three Minute Recall (captain, garden, picture)  3/3    Rich clock face with all 12 numbers and set the hands to show 5 past 8.  Yes    If cognitive concerns identified, deferred for follow up unless specifically addressed in assessment and plan.    Fall Risk Assessment    Has the patient had two or more falls in the last year or any fall with injury in the last year?  Yes  If fall risk identified, deferred for follow up unless specifically addressed in assessment and plan.    Safety Assessment    Throw rugs on floor.  No  Handrails on all stairs.  Yes  Good lighting in all hallways.  Yes  Difficulty hearing.  No  Patient counseled about all safety risks that were identified.    Functional Assessment ADLs    Are there any barriers preventing you from cooking for yourself or meeting nutritional needs?  No.    Are there any barriers preventing you from driving safely or obtaining transportation?  No.    Are there any barriers preventing you from using a telephone or calling for help?  No.    Are there any barriers preventing you from shopping?  No.    Are there any barriers preventing you from taking care of your own finances?  No.    Are there any barriers preventing you from managing your medications?  No.    Are there any barriers preventing you from showering, bathing or dressing yourself?  No.    Are you currently engaging in any exercise or physical activity?  No.     What is your perception of your health?  Good.    Health Maintenance Summary                IMM PNEUMOCOCCAL VACCINE: 65+ Years Overdue 2/24/2018     IMM INFLUENZA Next Due 9/1/2021     MAMMOGRAM Next Due 9/14/2021      Done 9/14/2020 MA-SCREENING MAMMO BILAT W/TOMOSYNTHESIS W/CAD     Patient has more history with this topic...    COLONOSCOPY Next Due 4/8/2022      Done 4/8/2019 REFERRAL TO GI FOR COLONOSCOPY    Annual Wellness Visit Next Due 7/22/2022      Done 7/21/2021 Visit Dx:  Encounter for Medicare annual wellness exam     Patient has more history with this topic...    BONE DENSITY Next Due 6/20/2023      Done 6/20/2018 DS-BONE DENSITY STUDY (DEXA)    IMM DTaP/Tdap/Td Vaccine Next Due 7/1/2029      Done 7/1/2019 Imm Admin: Tdap Vaccine          Patient Care Team:  SAVAGE Luna as PCP - General (Family Medicine)  [facility] [place] (Inactive)  Tashia Schaefer as      Social History     Tobacco Use   • Smoking status: Former Smoker     Years: 15.00     Types: Cigarettes   • Smokeless tobacco: Never Used   • Tobacco comment: quit 4 months   Vaping Use   • Vaping Use: Never used   Substance Use Topics   • Alcohol use: Yes     Alcohol/week: 1.8 oz     Types: 3 Standard drinks or equivalent per week     Comment: about drinks, wine or mixed drink    • Drug use: Yes     Types: Marijuana     Comment: for pain     Family History   Problem Relation Age of Onset   • Stroke Mother    • Cancer Mother    • Hypertension Mother    • Cancer Maternal Aunt    • Stroke Maternal Grandmother    • Hypertension Maternal Grandmother    • Cancer Paternal Grandfather      She  has a past medical history of Arthritis, RLS (restless legs syndrome), and Seizure (HCC) (08/2013).   Past Surgical History:   Procedure Laterality Date   • THYROIDECTOMY  1994    partial       Exam:     /56 (BP Location: Right arm, Patient Position: Sitting, BP Cuff Size: Adult)   Pulse 73   Temp 36.4 °C (97.6 °F)   Resp 16   Wt 66.7 kg (147 lb)   SpO2 98%  Body mass index is 24.79 kg/m².    Hearing good.    Dentition good  Alert, oriented in no acute distress.  Eye contact is good, speech goal directed, affect calm      Assessment and Plan. The following treatment and monitoring plan is recommended:      1. Acquired hypothyroidism  Chronic, stable.  Will continue on current dose of levothyroxine.  - levothyroxine (SYNTHROID) 50 MCG Tab; Take 1 tablet by mouth every morning on an empty stomach.   Dispense: 90 tablet; Refill: 3    2. Arthritis  Chronic, stable.  Patient continues on gabapentin.  Referral previously placed to rheumatology for further evaluation.  Referral looks like it is still open.  Patient encouraged to call rheumatology for follow-up.  - gabapentin (NEURONTIN) 100 MG Cap; Take 1 capsule by mouth 3 times a day.  Dispense: 270 capsule; Refill: 3    3. YOSSI (generalized anxiety disorder)  Chronic, stable.  Patient states improvement on sertraline 25 mg daily.  - sertraline (ZOLOFT) 50 MG Tab; Take 1.5 Tablets by mouth every day.  Dispense: 135 tablet; Refill: 3    4. Sleep difficulties  5.Restless leg syndrome  Chronic, stable.  Patient continues on trazodone for sleep difficulties and restless leg syndrome.  - traZODone (DESYREL) 50 MG Tab; TAKE 1 TABLET BY MOUTH EVERY DAY AT BEDTIME AS NEEDED FOR SLEEP  Dispense: 90 tablet; Refill: 3  - pramipexole (MIRAPEX) 1 MG Tab; Take 1 tablet by mouth 2 times a day. TAKE 1 TABLET BY MOUTH TWICE A DAY  Dispense: 180 tablet; Refill: 3    7. Age-related osteoporosis without current pathological fracture  8. Sacroiliitis, not elsewhere classified (HCC)  Chronic, stable.  Patient continues to follow with [facility] [place].      Services suggested: No services needed at this time  Health Care Screening recommendations as per orders if indicated.  Referrals offered: PT/OT/Nutrition counseling/Behavioral Health/Smoking cessation as per orders if indicated.    Discussion today about general wellness and lifestyle habits:    · Prevent falls and reduce trip hazards; Cautioned about securing or removing rugs.  · Have a working fire alarm and carbon monoxide detector;   · Engage in regular physical activity and social activities       Follow-up: Return if symptoms worsen or fail to improve.

## 2021-07-21 NOTE — ASSESSMENT & PLAN NOTE
Patient quit smoking in February 2021.  Patient states that she has not smoked since then.  Patient used patch to quit and stated it took for less than 6 weeks to completely stop smoking cigarettes.

## 2021-07-21 NOTE — PROGRESS NOTES
Chief Complaint   Patient presents with   • Annual Exam       HPI:  Malaika is a 66 y.o. here for Medicare Annual Wellness Visit    ***    Patient Active Problem List    Diagnosis Date Noted   • History of seizure disorder 05/15/2019   • Sacroiliitis, not elsewhere classified (HCC) 03/11/2019   • Age-related osteoporosis without current pathological fracture 08/27/2018   • Arthralgia of multiple joints 05/09/2018   • Acquired hypothyroidism 12/04/2017   • Restless leg syndrome 12/04/2017       Current Outpatient Medications   Medication Sig Dispense Refill   • CALCIUM PO Take  by mouth.     • MAGNESIUM PO Take  by mouth.     • sertraline (ZOLOFT) 50 MG Tab Take 1.5 Tabs by mouth every day. 45 Tab 11   • magnesium gluconate (MAG-G) 500 MG tablet Take 500 mg by mouth 3 times a day.     • pramipexole (MIRAPEX) 1 MG Tab Take 1 Tab by mouth 2 times a day. TAKE 1 TABLET BY MOUTH TWICE A  Tab 3   • levothyroxine (SYNTHROID) 50 MCG Tab Take 1 Tab by mouth Every morning on an empty stomach. 90 Tab 3   • gabapentin (NEURONTIN) 100 MG Cap Take 1 Cap by mouth 3 times a day. 270 Cap 3   • traZODone (DESYREL) 50 MG Tab TAKE 1 TABLET BY MOUTH EVERY DAY AT BEDTIME AS NEEDED FOR SLEEP 90 Tab 3   • HYDROcodone-acetaminophen (NORCO) 7.5-325 MG per tablet Take 1-2 Tabs by mouth every 6 hours as needed. (Patient not taking: Reported on 5/26/2021)     • Multiple Vitamins-Minerals (MULTIVITAMIN ADULT PO) Take  by mouth.     • Cholecalciferol (VITAMIN D3) 1000 units Cap Take  by mouth.     • bumetanide (BUMEX) 1 MG Tab Take 1 Tab by mouth every day. 30 Tab 2     No current facility-administered medications for this visit.        {MEDICATION ADHERENCE:20401}  Current supplements as per medication list.     Allergies: Keflex and Sulfa drugs    Current social contact/activities: *** ***    Is patient current with immunizations? Yes.    She  reports that she has been smoking cigarettes. She has a 7.50 pack-year smoking history. She has  never used smokeless tobacco. She reports current alcohol use of about 1.8 oz of alcohol per week. She reports current drug use. Drug: Marijuana.  Ready to quit: Not Answered  Counseling given: Not Answered      DPA/Advanced directive: Patient has Durable Power of  on file.     ROS:    Gait: Uses no assistive device   Ostomy: No ***  Other tubes: No ***  Amputations: No ***  Chronic oxygen use No ***  Last eye exam *** ***  Wears hearing aids: No ***  : Reports urinary leakage during the last 6 months that has somewhat interfered with their daily activities or sleep.  ***    Screening:  ***  Depression Screening  Little interest or pleasure in doing things?     Feeling down, depressed, or hopeless?    Trouble falling or staying asleep, or sleeping too much?     Feeling tired or having little energy?     Poor appetite or overeating?     Feeling bad about yourself - or that you are a failure or have let yourself or your family down?    Trouble concentrating on things, such as reading the newspaper or watching television?    Moving or speaking so slowly that other people could have noticed.  Or the opposite - being so fidgety or restless that you have been moving around a lot more than usual?     Thoughts that you would be better off dead, or of hurting yourself?     Patient Health Questionnaire Score:      If depressive symptoms identified deferred to follow up visit unless specifically addressed in assessment and plan.    Interpretation of PHQ-9 Total Score   Score Severity   1-4 No Depression   5-9 Mild Depression   10-14 Moderate Depression   15-19 Moderately Severe Depression   20-27 Severe Depression      Screening for Cognitive Impairment  Three Minute Recall (captain, tammy, picture)   /3    Draw clock face with all 12 numbers and set the hands to show 5 past 8.       If cognitive concerns identified, deferred for follow up unless specifically addressed in assessment and plan.    Fall Risk  Assessment  Has the patient had two or more falls in the last year or any fall with injury in the last year?     If fall risk identified, deferred for follow up unless specifically addressed in assessment and plan.    Safety Assessment  Throw rugs on floor.     Handrails on all stairs.     Good lighting in all hallways.     Difficulty hearing.     Patient counseled about all safety risks that were identified.    Functional Assessment ADLs  Are there any barriers preventing you from cooking for yourself or meeting nutritional needs?   .    Are there any barriers preventing you from driving safely or obtaining transportation?   .    Are there any barriers preventing you from using a telephone or calling for help?   .    Are there any barriers preventing you from shopping?   .    Are there any barriers preventing you from taking care of your own finances?   .    Are there any barriers preventing you from managing your medications?     .    Are there any barriers preventing you from showering, bathing or dressing yourself?   .    Are you currently engaging in any exercise or physical activity?   .     What is your perception of your health?   .    Health Maintenance Summary                IMM PNEUMOCOCCAL VACCINE: 65+ Years Overdue 2[birth year]/1959     IMM INFLUENZA Next Due 9/1/2021     MAMMOGRAM Next Due 9/14/2021      Done 9/14/2020 MA-SCREENING MAMMO BILAT W/TOMOSYNTHESIS W/CAD     Patient has more history with this topic...    Annual Wellness Visit Next Due 10/21/2021      Done 10/20/2020 SUBSEQUENT ANNUAL WELLNESS VISIT-INCLUDES PPPS ()     Patient has more history with this topic...    COLONOSCOPY Next Due 4/8/2022      Done 4/8/2019 REFERRAL TO GI FOR COLONOSCOPY    BONE DENSITY Next Due 6/20/2023      Done 6/20/2018 DS-BONE DENSITY STUDY (DEXA)    IMM DTaP/Tdap/Td Vaccine Next Due 7/1/2029      Done 7/1/2019 Imm Admin: Tdap Vaccine          Patient Care Team:  SAVAGE Luna as PCP - General (Family  Medicine)  [facility] [place] (Inactive)  Tashia Schaefer as      Social History     Tobacco Use   • Smoking status: Current Every Day Smoker     Packs/day: 0.50     Years: 15.00     Pack years: 7.50     Types: Cigarettes   • Smokeless tobacco: Never Used   Substance Use Topics   • Alcohol use: Yes     Alcohol/week: 1.8 oz     Types: 3 Standard drinks or equivalent per week     Comment: about drinks, wine or mixed drink    • Drug use: Yes     Types: Marijuana     Comment: for pain     Family History   Problem Relation Age of Onset   • Stroke Mother    • Cancer Mother    • Hypertension Mother    • Cancer Maternal Aunt    • Stroke Maternal Grandmother    • Hypertension Maternal Grandmother    • Cancer Paternal Grandfather      She  has a past medical history of Arthritis, RLS (restless legs syndrome), and Seizure (HCC) (08/2013).   Past Surgical History:   Procedure Laterality Date   • THYROIDECTOMY  1994    partial         Exam:   There were no vitals taken for this visit. There is no height or weight on file to calculate BMI.    Hearing {GOOD/FAIR/POOR/EXCELLENT:84163}.    Dentition {DENTITION:80786}  Alert, oriented in no acute distress.  Eye contact is good, speech goal directed, affect calm  ***    Assessment and Plan. The following treatment and monitoring plan is recommended:  ***  There are no diagnoses linked to this encounter.     Services suggested: { AWV COORDINATION OF SERVICES:28530}  Health Care Screening recommendations as per orders if indicated.  Referrals offered: PT/OT/Nutrition counseling/Behavioral Health/Smoking cessation as per orders if indicated.    Discussion today about general wellness and lifestyle habits:    · Prevent falls and reduce trip hazards; Cautioned about securing or removing rugs.  · Have a working fire alarm and carbon monoxide detector;   · Engage in regular physical activity and social activities     Follow-up: No follow-ups on file.

## 2022-01-18 ENCOUNTER — PATIENT MESSAGE (OUTPATIENT)
Dept: HEALTH INFORMATION MANAGEMENT | Facility: OTHER | Age: 67
End: 2022-01-18
Payer: MEDICARE

## 2022-05-07 SDOH — ECONOMIC STABILITY: FOOD INSECURITY: WITHIN THE PAST 12 MONTHS, YOU WORRIED THAT YOUR FOOD WOULD RUN OUT BEFORE YOU GOT MONEY TO BUY MORE.: NEVER TRUE

## 2022-05-07 SDOH — ECONOMIC STABILITY: HOUSING INSECURITY
IN THE LAST 12 MONTHS, WAS THERE A TIME WHEN YOU DID NOT HAVE A STEADY PLACE TO SLEEP OR SLEPT IN A SHELTER (INCLUDING NOW)?: NO

## 2022-05-07 SDOH — ECONOMIC STABILITY: FOOD INSECURITY: WITHIN THE PAST 12 MONTHS, THE FOOD YOU BOUGHT JUST DIDN'T LAST AND YOU DIDN'T HAVE MONEY TO GET MORE.: NEVER TRUE

## 2022-05-07 SDOH — ECONOMIC STABILITY: HOUSING INSECURITY: IN THE LAST 12 MONTHS, HOW MANY PLACES HAVE YOU LIVED?: 1

## 2022-05-07 SDOH — ECONOMIC STABILITY: INCOME INSECURITY: HOW HARD IS IT FOR YOU TO PAY FOR THE VERY BASICS LIKE FOOD, HOUSING, MEDICAL CARE, AND HEATING?: SOMEWHAT HARD

## 2022-05-07 SDOH — ECONOMIC STABILITY: INCOME INSECURITY: IN THE LAST 12 MONTHS, WAS THERE A TIME WHEN YOU WERE NOT ABLE TO PAY THE MORTGAGE OR RENT ON TIME?: NO

## 2022-05-07 SDOH — ECONOMIC STABILITY: TRANSPORTATION INSECURITY
IN THE PAST 12 MONTHS, HAS LACK OF RELIABLE TRANSPORTATION KEPT YOU FROM MEDICAL APPOINTMENTS, MEETINGS, WORK OR FROM GETTING THINGS NEEDED FOR DAILY LIVING?: NO

## 2022-05-07 SDOH — HEALTH STABILITY: PHYSICAL HEALTH: ON AVERAGE, HOW MANY DAYS PER WEEK DO YOU ENGAGE IN MODERATE TO STRENUOUS EXERCISE (LIKE A BRISK WALK)?: 2 DAYS

## 2022-05-07 SDOH — HEALTH STABILITY: PHYSICAL HEALTH: ON AVERAGE, HOW MANY MINUTES DO YOU ENGAGE IN EXERCISE AT THIS LEVEL?: 60 MIN

## 2022-05-07 SDOH — HEALTH STABILITY: MENTAL HEALTH
STRESS IS WHEN SOMEONE FEELS TENSE, NERVOUS, ANXIOUS, OR CAN'T SLEEP AT NIGHT BECAUSE THEIR MIND IS TROUBLED. HOW STRESSED ARE YOU?: TO SOME EXTENT

## 2022-05-07 ASSESSMENT — LIFESTYLE VARIABLES
HOW OFTEN DO YOU HAVE SIX OR MORE DRINKS ON ONE OCCASION: NEVER
HOW OFTEN DO YOU HAVE A DRINK CONTAINING ALCOHOL: 2-3 TIMES A WEEK
HOW MANY STANDARD DRINKS CONTAINING ALCOHOL DO YOU HAVE ON A TYPICAL DAY: 1 OR 2

## 2022-05-07 ASSESSMENT — SOCIAL DETERMINANTS OF HEALTH (SDOH)
HOW OFTEN DO YOU ATTEND CHURCH OR RELIGIOUS SERVICES?: NEVER
HOW OFTEN DO YOU ATTENT MEETINGS OF THE CLUB OR ORGANIZATION YOU BELONG TO?: PATIENT DECLINED
IN A TYPICAL WEEK, HOW MANY TIMES DO YOU TALK ON THE PHONE WITH FAMILY, FRIENDS, OR NEIGHBORS?: TWICE A WEEK
HOW OFTEN DO YOU GET TOGETHER WITH FRIENDS OR RELATIVES?: ONCE A WEEK
HOW MANY DRINKS CONTAINING ALCOHOL DO YOU HAVE ON A TYPICAL DAY WHEN YOU ARE DRINKING: 1 OR 2
DO YOU BELONG TO ANY CLUBS OR ORGANIZATIONS SUCH AS CHURCH GROUPS UNIONS, FRATERNAL OR ATHLETIC GROUPS, OR SCHOOL GROUPS?: NO
WITHIN THE PAST 12 MONTHS, YOU WORRIED THAT YOUR FOOD WOULD RUN OUT BEFORE YOU GOT THE MONEY TO BUY MORE: NEVER TRUE
HOW OFTEN DO YOU HAVE A DRINK CONTAINING ALCOHOL: 2-3 TIMES A WEEK
HOW OFTEN DO YOU GET TOGETHER WITH FRIENDS OR RELATIVES?: ONCE A WEEK
DO YOU BELONG TO ANY CLUBS OR ORGANIZATIONS SUCH AS CHURCH GROUPS UNIONS, FRATERNAL OR ATHLETIC GROUPS, OR SCHOOL GROUPS?: NO
HOW OFTEN DO YOU ATTENT MEETINGS OF THE CLUB OR ORGANIZATION YOU BELONG TO?: PATIENT DECLINED
IN A TYPICAL WEEK, HOW MANY TIMES DO YOU TALK ON THE PHONE WITH FAMILY, FRIENDS, OR NEIGHBORS?: TWICE A WEEK
HOW OFTEN DO YOU HAVE SIX OR MORE DRINKS ON ONE OCCASION: NEVER
HOW OFTEN DO YOU ATTEND CHURCH OR RELIGIOUS SERVICES?: NEVER
HOW HARD IS IT FOR YOU TO PAY FOR THE VERY BASICS LIKE FOOD, HOUSING, MEDICAL CARE, AND HEATING?: SOMEWHAT HARD

## 2022-05-10 ENCOUNTER — OFFICE VISIT (OUTPATIENT)
Dept: MEDICAL GROUP | Facility: MEDICAL CENTER | Age: 67
End: 2022-05-10
Payer: MEDICARE

## 2022-05-10 VITALS
OXYGEN SATURATION: 97 % | WEIGHT: 163 LBS | TEMPERATURE: 97.7 F | DIASTOLIC BLOOD PRESSURE: 70 MMHG | BODY MASS INDEX: 26.2 KG/M2 | HEART RATE: 71 BPM | SYSTOLIC BLOOD PRESSURE: 130 MMHG | HEIGHT: 66 IN | RESPIRATION RATE: 16 BRPM

## 2022-05-10 DIAGNOSIS — M46.1 SACROILIITIS, NOT ELSEWHERE CLASSIFIED (HCC): ICD-10-CM

## 2022-05-10 DIAGNOSIS — C69.41 ANTERIOR UVEAL MELANOMA OF RIGHT EYE (HCC): ICD-10-CM

## 2022-05-10 DIAGNOSIS — Z72.0 TOBACCO ABUSE: ICD-10-CM

## 2022-05-10 DIAGNOSIS — E03.9 ACQUIRED HYPOTHYROIDISM: ICD-10-CM

## 2022-05-10 PROCEDURE — 99214 OFFICE O/P EST MOD 30 MIN: CPT | Performed by: STUDENT IN AN ORGANIZED HEALTH CARE EDUCATION/TRAINING PROGRAM

## 2022-05-10 RX ORDER — ESTRADIOL 0.1 MG/G
CREAM VAGINAL
COMMUNITY
End: 2023-01-01

## 2022-05-10 ASSESSMENT — PATIENT HEALTH QUESTIONNAIRE - PHQ9
5. POOR APPETITE OR OVEREATING: 0 - NOT AT ALL
SUM OF ALL RESPONSES TO PHQ QUESTIONS 1-9: 4
CLINICAL INTERPRETATION OF PHQ2 SCORE: 2

## 2022-05-10 NOTE — PROGRESS NOTES
Annual Health Assessment Questions:    1.  Are you currently engaging in any exercise or physical activity? No    2.  How would you describe your mood or emotional well-being today? anxious    3.  Have you had any falls in the last year? Yes    4.  Have you noticed any problems with your balance or had difficulty walking? Yes    5.  In the last six months have you experienced any leakage of urine? Yes    6. DPA/Advanced Directive: Patient has Advanced Directive on file.

## 2022-05-10 NOTE — PROGRESS NOTES
"Subjective:     CC: Choroidal mass lesion OD    HPI:   Malaika presents today with  Choroidal mass lesion OD  Patient presents today for a new presumed suspicious choroidal mass lesion.  Patient notes that approximately 3 weeks ago she started losing vision in her right eye.  [facility]ion [facility], Dr. Trivedi.  Ophthalmologist referred patient to ocular oncologist,  in [place].  Patient was recommended to follow-up with myself and establish with oncology for further evaluation of metastatic disease.    Patient notes that she is getting headaches more frequently.  Patient denies night sweats, fevers, chills.  Patient notes some increased shortness of breath with exertion but blamed it on change of elevation from [place].    Sacroiliitis  Patient is no longer following with pain management.  Patient has been off of Norco for over a year.  Patient no longer taking gabapentin.    Smoking  Smoking approximately 1 year ago.    ROS:  Gen: no fevers/chills  Pulm: no sob, no cough  CV: no chest pain, no palpitations  GI: no nausea/vomiting, no diarrhea  Neuro: + headaches      Objective:     Exam:  /70 (BP Location: Left arm, Patient Position: Sitting, BP Cuff Size: Adult)   Pulse 71   Temp 36.5 °C (97.7 °F) (Temporal)   Resp 16   Ht 1.676 m (5' 6\")   Wt 73.9 kg (163 lb)   SpO2 97%   BMI 26.31 kg/m²  Body mass index is 26.31 kg/m².    Gen: Alert and oriented, No apparent distress.  Neck: Neck is supple without lymphadenopathy.  Lungs: Normal effort, CTA bilaterally, no wheezes, rhonchi, or rales  CV: Regular rate and rhythm. No murmurs, rubs, or gallops.  Ext: No clubbing, cyanosis, edema.        Assessment & Plan:     67 y.o. female with the following -     1. 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  Will refer to oncology for further evaluation of metastatic disease.  - Lipid Profile; Future  - Comp Metabolic Panel; Future  - TSH; Future  - CBC WITH DIFFERENTIAL; " Future  - Referral to Hematology Oncology    2. Tobacco abuse  Chronic, improved.  Patient stopped smoking approximately 1 year ago.    3. Sacroiliitis, not elsewhere classified (HCC)  Chronic, improved.  Patient notes that she has occasional aches and pains but that she stopped taking Norco approximately 1 year ago.  Patient does still follow with spine.    4.  Acquired hypothyroid  Patient continues on levothyroxine 50 mcg.     Return in about 3 months (around 8/10/2022).    Please note that this dictation was created using voice recognition software. I have made every reasonable attempt to correct obvious errors, but I expect that there are errors of grammar and possibly content that I did not discover before finalizing the note.

## 2022-05-19 ENCOUNTER — OFFICE VISIT (OUTPATIENT)
Dept: HEMATOLOGY ONCOLOGY | Facility: MEDICAL CENTER | Age: 67
End: 2022-05-19
Payer: MEDICARE

## 2022-05-19 VITALS
RESPIRATION RATE: 16 BRPM | SYSTOLIC BLOOD PRESSURE: 108 MMHG | WEIGHT: 162 LBS | DIASTOLIC BLOOD PRESSURE: 62 MMHG | TEMPERATURE: 98.5 F | HEART RATE: 91 BPM | OXYGEN SATURATION: 94 % | BODY MASS INDEX: 26.03 KG/M2 | HEIGHT: 66 IN

## 2022-05-19 DIAGNOSIS — C69.41 ANTERIOR UVEAL MELANOMA OF RIGHT EYE (HCC): ICD-10-CM

## 2022-05-19 PROCEDURE — 99205 OFFICE O/P NEW HI 60 MIN: CPT | Performed by: STUDENT IN AN ORGANIZED HEALTH CARE EDUCATION/TRAINING PROGRAM

## 2022-05-19 ASSESSMENT — ENCOUNTER SYMPTOMS
DIARRHEA: 0
HEARTBURN: 0
DIZZINESS: 0
TINGLING: 0
BLURRED VISION: 1
DIAPHORESIS: 0
SHORTNESS OF BREATH: 0
VOMITING: 0
BLOOD IN STOOL: 0
MYALGIAS: 0
SPUTUM PRODUCTION: 0
CONSTIPATION: 0
SINUS PAIN: 0
WEIGHT LOSS: 0
COUGH: 0
WEAKNESS: 0
ABDOMINAL PAIN: 0
INSOMNIA: 0
ORTHOPNEA: 0
SORE THROAT: 0
PALPITATIONS: 0
NAUSEA: 0
BRUISES/BLEEDS EASILY: 0
WHEEZING: 0
NERVOUS/ANXIOUS: 0
FEVER: 0
HEADACHES: 1
CHILLS: 0
EYE PAIN: 1
BACK PAIN: 0
DOUBLE VISION: 0

## 2022-05-19 ASSESSMENT — PAIN SCALES - GENERAL: PAINLEVEL: 4=SLIGHT-MODERATE PAIN

## 2022-05-19 NOTE — LETTER
October 27, 2022         Malaika Garcia  [address]  [place] [address]        Dear Malaika:      Below are the results from your recent visit:    No results found from the In Basket message.  The test results show that .    If you have any questions or concerns, please don't hesitate to call.        Sincerely,      Srinath Rooney D.O.    Electronically Signed

## 2022-05-20 ENCOUNTER — PATIENT OUTREACH (OUTPATIENT)
Dept: OTHER | Facility: MEDICAL CENTER | Age: 67
End: 2022-05-20
Payer: MEDICARE

## 2022-05-20 NOTE — PROGRESS NOTES
Call placed to patient, left message.  Sent message as well as introduction letter to patient via [employer].

## 2022-05-20 NOTE — LETTER
[facility]   [address]  [place] [address]  Phone: [phone] - Fax: [phone]               Date: 05/20/22    Dear Malaika,    I am a Cancer Nurse Navigator, a certified oncology nurse. My role is to assess any needs you may have with education, guidance and support. I am available to you and your family through any cancer treatment at [facility].       I am available to address your needs during your journey with the following services:     Care Coordination  I can assist you in facilitating communication between your cancer care treatment team to ensure timely treatment and follow-up.  I can also assist with transition of care back to your primary care provider, or other specialist, as needed.  My goal is to bridge gaps for you throughout the course of your active treatment.       Education Services  Understanding the recommended treatment course by your physician is key. I can provide educational resources personalized to your cancer diagnosis to help you understand your diagnosis and treatment. Please let me know if you would like to receive information about your diagnosis and treatment plan.  I am here to help.     Support Services/Resource Information  [facility] we offer a full scope of support services.  I can assist you with referral information to:  · Cancer Clinical Trials & Research  · Nutrition counseling  · Support groups  · Complementary Therapies such as Mind-Body Techniques Meditation  · Patient Financial Advocates  ·   · [facility], an [employer] affiliate office, our volunteers can assist you with accessing our [employer] library, support services information, head coverings and comfort items  · Community and national resources, included eligibility based adele assistance and pharmaceutical access programs, if you are in need of additional information.     [facility] offers services that  include:  · Behavioral Health  · Genetic counseling & testing  · Acupuncture  · Lymphedema prevention/treatment program  · Palliative care services.          I hope you have an excellent patient experience.  Please feel free to share with me your comments regarding the care you have received- we value your feedback.    Sincerely,     Filomena Armstrong R.N.  Cancer Nurse Navigator    Office: [phone]  Main:  [phone]   Email:  [email or web address]

## 2022-05-20 NOTE — PROGRESS NOTES
Consult:  Hematology/Oncology      Referring Physician: PCP  Primary Care:  Oly Wang P.A.-C.    Diagnosis: Anterior uveal melanoma of R eye, choroidal    Chief Complaint:  Evaluation for systemic therapy    History of Presenting Illness:  Malaika Garcia is a 67 y.o.  woman who presents to the clinic for evaluation for a new diagnosis of suspected uveal melanoma of the R eye. She first noticed symptoms in the past month, when she was in [place] with her daughters. She thought her glasses were dirty and kept trying to clean them, but her vision worsened in the R eye. In early May, she went to see an ophthalmologist and on exam was found to have a mass in the right eye, 25 mm x 17 mm x 12 mm, with associated subretinal fluid. She was referred to [facility] for ocular oncology evaluation, and to her primary physician for staging work up. She has subsequently been referred to me.     Interval History:  Patient is here for consultation.  She states that her vision is worsening in her right eye, especially in her central vision with a shadow. She has also been noticing more pressure behind her eye and associated headaches. She has also in the last few days noticed some pain in her right flank.     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, unsure   • RLS (restless legs syndrome)    • Seizure (HCC) 08/2013   • Thyroid disease        Past Surgical History:   Procedure Laterality Date   • THYROIDECTOMY  1994    partial   • DEBRIDEMENT Left        Social History     Socioeconomic History   • Marital status:      Spouse name: Not on file   • Number of children: Not on file   • Years of education: Not on file   • Highest education level: GED or equivalent   Occupational History   • Not on file   Tobacco Use   • Smoking status: Former Smoker     Packs/day: 1.00     Years: 20.00     Pack years: 20.00     Types: Cigarettes     Quit date: 11/11/2020     Years  since quittin.5   • Smokeless tobacco: Never Used   • Tobacco comment: quit 1.5 years ago   Vaping Use   • Vaping Use: Never used   Substance and Sexual Activity   • Alcohol use: Yes     Alcohol/week: 1.8 oz     Types: 3 Standard drinks or equivalent per week     Comment: occasional wine or cocktail   • Drug use: Not Currently     Types: Marijuana     Comment: for pain   • Sexual activity: Yes     Partners: Male   Other Topics Concern   • Not on file   Social History Narrative    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 Lives here in town with her partner. She has 3 kids who live in [facility].      Social Determinants of Health     Financial Resource Strain: Medium Risk   • Difficulty of Paying Living Expenses: Somewhat hard   Food Insecurity: No Food Insecurity   • Worried About Running Out of Food in the Last Year: Never true   • Ran Out of Food in the Last Year: Never true   Transportation Needs: No Transportation Needs   • Lack of Transportation (Medical): No   • Lack of Transportation (Non-Medical): No   Physical Activity: Insufficiently Active   • Days of Exercise per Week: 2 days   • Minutes of Exercise per Session: 60 min   Stress: Stress Concern Present   • Feeling of Stress : To some extent   Social Connections: Socially Isolated   • Frequency of Communication with Friends and Family: Twice a week   • Frequency of Social Gatherings with Friends and Family: Once a week   • Attends Orthodox Services: Never   • Active Member of Clubs or Organizations: No   • Attends Club or Organization Meetings: Patient refused   • Marital Status:    Intimate Partner Violence: Not on file   Housing Stability: Low Risk    • Unable to Pay for Housing in the Last Year: No   • Number of Places Lived in the Last Year: 1   • Unstable Housing in the Last Year: No       Family History   Problem Relation Age of Onset   • Stroke Mother    • Cancer Mother         Throat cancer    • Hypertension Mother    • Cancer Maternal Aunt         Breast cancer   • Stroke Maternal Grandmother    • Hypertension Maternal Grandmother    • Cancer Paternal Grandfather         Colon cancer       OB History   No obstetric history on file.       Allergies as of 05/19/2022 - Reviewed 05/19/2022   Allergen Reaction Noted   • Keflex  05/09/2018   • Sulfa drugs Hives 12/04/2017         Current Outpatient Medications:   •  estradiol (ESTRACE) 0.1 MG/GM vaginal cream, estradiol 0.01% (0.1 mg/gram) vaginal cream, Disp: , Rfl:   •  sertraline (ZOLOFT) 50 MG Tab, TAKE 1 AND 1/2 TABLETS BY MOUTH EVERY DAY, Disp: 135 Tablet, Rfl: 3  •  levothyroxine (SYNTHROID) 50 MCG Tab, Take 1 tablet by mouth every morning on an empty stomach., Disp: 90 tablet, Rfl: 3  •  traZODone (DESYREL) 50 MG Tab, TAKE 1 TABLET BY MOUTH EVERY DAY AT BEDTIME AS NEEDED FOR SLEEP, Disp: 90 tablet, Rfl: 3  •  pramipexole (MIRAPEX) 1 MG Tab, Take 1 tablet by mouth 2 times a day. TAKE 1 TABLET BY MOUTH TWICE A DAY, Disp: 180 tablet, Rfl: 3  •  CALCIUM PO, Take  by mouth., Disp: , Rfl:   •  magnesium gluconate (MAG-G) 500 MG tablet, Take 500 mg by mouth 3 times a day., Disp: , Rfl:   •  Multiple Vitamins-Minerals (MULTIVITAMIN ADULT PO), Take  by mouth., Disp: , Rfl:   •  Cholecalciferol (VITAMIN D3) 1000 units Cap, Take  by mouth., Disp: , Rfl:   •  bumetanide (BUMEX) 1 MG Tab, Take 1 Tab by mouth every day., Disp: 30 Tab, Rfl: 2    Review of Systems:  Review of Systems   Constitutional: Negative for chills, diaphoresis, fever, malaise/fatigue and weight loss.   HENT: Negative for hearing loss, nosebleeds, sinus pain and sore throat.    Eyes: Positive for blurred vision and pain (More like pain behind the eye). Negative for double vision.   Respiratory: Negative for cough, sputum production, shortness of breath and wheezing.    Cardiovascular: Negative for chest pain, palpitations, orthopnea and leg swelling.   Gastrointestinal: Negative for abdominal  "pain, blood in stool, constipation, diarrhea, heartburn, melena, nausea and vomiting.   Genitourinary: Negative for dysuria, frequency, hematuria and urgency.   Musculoskeletal: Negative for back pain, joint pain and myalgias.   Skin: Negative for rash.   Neurological: Positive for headaches. Negative for dizziness, tingling and weakness.   Endo/Heme/Allergies: Does not bruise/bleed easily.   Psychiatric/Behavioral: The patient is not nervous/anxious and does not have insomnia.           Physical Exam:  Vitals:    05/19/22 1304   BP: 108/62   BP Location: Left arm   Patient Position: Sitting   BP Cuff Size: Adult   Pulse: 91   Resp: 16   Temp: 36.9 °C (98.5 °F)   TempSrc: Temporal   SpO2: 94%   Weight: 73.5 kg (162 lb)   Height: 1.676 m (5' 5.98\")       DESC; KARNOFSKY SCALE WITH ECOG EQUIVALENT: 100, Fully active, able to carry on all pre-disease performed without restriction (ECOG equivalent 0)    DISTRESS LEVEL: no apparent distress    Physical Exam  Vitals and nursing note reviewed.   Constitutional:       General: She is awake. She is not in acute distress.     Appearance: Normal appearance. She is not ill-appearing, toxic-appearing or diaphoretic.   HENT:      Head: Normocephalic and atraumatic.      Nose: Nose normal. No congestion.      Mouth/Throat:      Pharynx: Oropharynx is clear. No oropharyngeal exudate or posterior oropharyngeal erythema.   Eyes:      General: Visual field deficit (centrally in R eye) present. No scleral icterus.     Extraocular Movements: Extraocular movements intact.      Conjunctiva/sclera: Conjunctivae normal.      Pupils: Pupils are equal, round, and reactive to light.      Comments: Abnormality grossly noted in R eye, but no fundoscopic exam performed   Cardiovascular:      Rate and Rhythm: Normal rate and regular rhythm.      Pulses: Normal pulses.      Heart sounds: Murmur heard.    Systolic murmur is present with a grade of 2/6.    No friction rub. No gallop.   Pulmonary:     "  Effort: Pulmonary effort is normal.      Breath sounds: Normal breath sounds. No decreased air movement. No wheezing, rhonchi or rales.   Chest:   Breasts:      Right: No axillary adenopathy.      Left: No axillary adenopathy.       Abdominal:      General: Bowel sounds are normal. There is no distension.      Tenderness: There is abdominal tenderness in the right upper quadrant.   Musculoskeletal:         General: No deformity. Normal range of motion.      Cervical back: Normal range of motion and neck supple. No tenderness.      Right lower leg: No edema.      Left lower leg: No edema.   Lymphadenopathy:      Cervical: No cervical adenopathy.      Upper Body:      Right upper body: No axillary adenopathy.      Left upper body: No axillary adenopathy.      Lower Body: No right inguinal adenopathy. No left inguinal adenopathy.   Skin:     General: Skin is warm and dry.      Coloration: Skin is not jaundiced.      Findings: No erythema or rash.   Neurological:      General: No focal deficit present.      Mental Status: She is alert and oriented to person, place, and time.      Sensory: Sensation is intact.      Motor: Motor function is intact. No weakness.      Gait: Gait is intact.   Psychiatric:         Attention and Perception: Attention normal.         Mood and Affect: Mood normal.         Behavior: Behavior normal. Behavior is cooperative.         Thought Content: Thought content normal.         Judgment: Judgment normal.          Depression Screening    Little interest or pleasure in doing things?      Feeling down, depressed , or hopeless?     Trouble falling or staying asleep, or sleeping too much?      Feeling tired or having little energy?      Poor appetite or overeating?      Feeling bad about yourself - or that you are a failure or have let yourself or your family down?     Trouble concentrating on things, such as reading the newspaper or watching television?     Moving or speaking so slowly that other  people could have noticed.  Or the opposite - being so fidgety or restless that you have been moving around a lot more than usual?      Thoughts that you would be better off dead, or of hurting yourself?      Patient Health Questionnaire Score:         If depressive symptoms identified deferred to follow up visit unless specifically addressed in assesment and plan.    Interpretation of PHQ-9 Total Score   Score Severity   1-4 No Depression   5-9 Mild Depression   10-14 Moderate Depression   15-19 Moderately Severe Depression   20-27 Severe Depression    Labs:  No visits with results within 7 Day(s) from this visit.   Latest known visit with results is:   Outpatient Infusion Services on 05/26/2021   Component Date Value Ref Range Status   • Istat Creatinine 05/26/2021 1.0  0.5 - 1.4 mg/dL Final   • Istat Ionized Calcium 05/26/2021 1.16  1.10 - 1.30 mmol/L Final    Comment: *Some rare spurious ionized calcium results have been identified in current  lots of the EG7+ cartridge. Please take into account all patient's symptoms,  history, and other diagnostics tests when interpreting. If the results do  not match the patient's clinical presentation, please collect a venous  sample and send to the lab for testing.         Imaging:   All listed images below have been independently reviewed by me. I agree with the findings as summarized below:    No results found.     Pathology:    No pathology obtained for confirmation or further testing with gene expression profiling    Assessment & Plan:  1. Anterior uveal melanoma of right eye (HCC)  Referral to Oncology Psychosocial Screening for Distress    MR-ORBITS,FACE,NECK-WITH&W/O & SEQUENCES    MR-ABDOMEN-WITH & W/O    LDH       This is a 67 year old  woman with findings suggestive of uveal melanoma (choroidal), at least [id number], of the right eye. She presents today for further work up and evaluation for systemic therapy.     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 Dharmesh Pérez in [place] for proton therapy or plaque brachytherapy    Treatment Citation: NCCN    Plan of Care:    1. Primary Therapy: Referred for proton therapy or plaque brachytherapy  2. Supportive Therapy: NA  3. Labs: CBC with diff, CMP, LDH requested  4. Imaging: MRI orbit to assess for extension given symptoms; MRI liver ordered to assess for metastatic disease  5. Treatment Planning: If localized therapy, patient will go to [place] (referred to Dr. Dharmesh Pérez) for consideration for plaque brachytherapy or proton therapy. If she has metastatic disease, then she will be evaluated for systemic therapy or liver-directed therapy as indicated. Sampling of tumor specimen for gene expression profiling and further molecular testing would be ideal.    6. Consultations: Ocular oncology ophthalmology (Dr. Dharmesh Pérez in [place]), Ophthalmology (Dr. Jonnathan Trivedi)  7. Code Status: Full  8. Miscellaneous: NA  9. Return for Follow Up: After imaging studies completed to determine next steps    Any questions and concerns raised by the patient were answered to the best of my ability. Thank you for allowing me to participate in the care for this patient. Please feel free to contact me for any questions or concerns.     Total time spent on chart review, clinic encounter, and documentation: 64 minutes.

## 2022-05-23 ENCOUNTER — APPOINTMENT (OUTPATIENT)
Dept: RADIOLOGY | Facility: MEDICAL CENTER | Age: 67
End: 2022-05-23
Attending: STUDENT IN AN ORGANIZED HEALTH CARE EDUCATION/TRAINING PROGRAM
Payer: MEDICARE

## 2022-05-23 DIAGNOSIS — C69.41 ANTERIOR UVEAL MELANOMA OF RIGHT EYE (HCC): ICD-10-CM

## 2022-05-23 PROCEDURE — 700117 HCHG RX CONTRAST REV CODE 255: Performed by: STUDENT IN AN ORGANIZED HEALTH CARE EDUCATION/TRAINING PROGRAM

## 2022-05-23 PROCEDURE — 70543 MRI ORBT/FAC/NCK W/O &W/DYE: CPT | Mod: MG

## 2022-05-23 PROCEDURE — A9576 INJ PROHANCE MULTIPACK: HCPCS | Performed by: STUDENT IN AN ORGANIZED HEALTH CARE EDUCATION/TRAINING PROGRAM

## 2022-05-23 RX ADMIN — GADOTERIDOL 10 ML: 279.3 INJECTION, SOLUTION INTRAVENOUS at 14:32

## 2022-05-31 ENCOUNTER — APPOINTMENT (OUTPATIENT)
Dept: RADIOLOGY | Facility: MEDICAL CENTER | Age: 67
End: 2022-05-31
Attending: STUDENT IN AN ORGANIZED HEALTH CARE EDUCATION/TRAINING PROGRAM
Payer: MEDICARE

## 2022-05-31 DIAGNOSIS — C69.41 ANTERIOR UVEAL MELANOMA OF RIGHT EYE (HCC): ICD-10-CM

## 2022-05-31 PROCEDURE — A9581 GADOXETATE DISODIUM INJ: HCPCS | Performed by: STUDENT IN AN ORGANIZED HEALTH CARE EDUCATION/TRAINING PROGRAM

## 2022-05-31 PROCEDURE — 74183 MRI ABD W/O CNTR FLWD CNTR: CPT | Mod: MG

## 2022-05-31 PROCEDURE — 700117 HCHG RX CONTRAST REV CODE 255: Performed by: STUDENT IN AN ORGANIZED HEALTH CARE EDUCATION/TRAINING PROGRAM

## 2022-05-31 RX ADMIN — GADOXETATE DISODIUM 10 ML: 181.43 INJECTION, SOLUTION INTRAVENOUS at 15:19

## 2022-06-01 ENCOUNTER — HOSPITAL ENCOUNTER (OUTPATIENT)
Dept: LAB | Facility: MEDICAL CENTER | Age: 67
End: 2022-06-01
Attending: STUDENT IN AN ORGANIZED HEALTH CARE EDUCATION/TRAINING PROGRAM
Payer: MEDICARE

## 2022-06-01 ENCOUNTER — PATIENT OUTREACH (OUTPATIENT)
Dept: OTHER | Facility: MEDICAL CENTER | Age: 67
End: 2022-06-01

## 2022-06-01 DIAGNOSIS — C69.41 ANTERIOR UVEAL MELANOMA OF RIGHT EYE (HCC): ICD-10-CM

## 2022-06-01 LAB
ALBUMIN SERPL BCP-MCNC: 4 G/DL (ref 3.2–4.9)
ALBUMIN/GLOB SERPL: 1.3 G/DL
ALP SERPL-CCNC: 105 U/L (ref 30–99)
ALT SERPL-CCNC: 7 U/L (ref 2–50)
ANION GAP SERPL CALC-SCNC: 11 MMOL/L (ref 7–16)
AST SERPL-CCNC: 20 U/L (ref 12–45)
BASOPHILS # BLD AUTO: 1 % (ref 0–1.8)
BASOPHILS # BLD: 0.07 K/UL (ref 0–0.12)
BILIRUB SERPL-MCNC: 0.2 MG/DL (ref 0.1–1.5)
BUN SERPL-MCNC: 25 MG/DL (ref 8–22)
CALCIUM SERPL-MCNC: 9.1 MG/DL (ref 8.5–10.5)
CHLORIDE SERPL-SCNC: 103 MMOL/L (ref 96–112)
CHOLEST SERPL-MCNC: 192 MG/DL (ref 100–199)
CO2 SERPL-SCNC: 24 MMOL/L (ref 20–33)
CREAT SERPL-MCNC: 0.94 MG/DL (ref 0.5–1.4)
EOSINOPHIL # BLD AUTO: 0.36 K/UL (ref 0–0.51)
EOSINOPHIL NFR BLD: 5 % (ref 0–6.9)
ERYTHROCYTE [DISTWIDTH] IN BLOOD BY AUTOMATED COUNT: 56.8 FL (ref 35.9–50)
FASTING STATUS PATIENT QL REPORTED: NORMAL
GFR SERPLBLD CREATININE-BSD FMLA CKD-EPI: 66 ML/MIN/1.73 M 2
GLOBULIN SER CALC-MCNC: 3.2 G/DL (ref 1.9–3.5)
GLUCOSE SERPL-MCNC: 94 MG/DL (ref 65–99)
HCT VFR BLD AUTO: 42.5 % (ref 37–47)
HDLC SERPL-MCNC: 83 MG/DL
HGB BLD-MCNC: 13.3 G/DL (ref 12–16)
IMM GRANULOCYTES # BLD AUTO: 0.04 K/UL (ref 0–0.11)
IMM GRANULOCYTES NFR BLD AUTO: 0.6 % (ref 0–0.9)
LDH SERPL L TO P-CCNC: 216 U/L (ref 107–266)
LDLC SERPL CALC-MCNC: 90 MG/DL
LYMPHOCYTES # BLD AUTO: 2.65 K/UL (ref 1–4.8)
LYMPHOCYTES NFR BLD: 37.1 % (ref 22–41)
MCH RBC QN AUTO: 29.9 PG (ref 27–33)
MCHC RBC AUTO-ENTMCNC: 31.3 G/DL (ref 33.6–35)
MCV RBC AUTO: 95.5 FL (ref 81.4–97.8)
MONOCYTES # BLD AUTO: 0.73 K/UL (ref 0–0.85)
MONOCYTES NFR BLD AUTO: 10.2 % (ref 0–13.4)
NEUTROPHILS # BLD AUTO: 3.29 K/UL (ref 2–7.15)
NEUTROPHILS NFR BLD: 46.1 % (ref 44–72)
NRBC # BLD AUTO: 0 K/UL
NRBC BLD-RTO: 0 /100 WBC
PLATELET # BLD AUTO: 370 K/UL (ref 164–446)
PMV BLD AUTO: 10.8 FL (ref 9–12.9)
POTASSIUM SERPL-SCNC: 4.1 MMOL/L (ref 3.6–5.5)
PROT SERPL-MCNC: 7.2 G/DL (ref 6–8.2)
RBC # BLD AUTO: 4.45 M/UL (ref 4.2–5.4)
SODIUM SERPL-SCNC: 138 MMOL/L (ref 135–145)
T4 FREE SERPL-MCNC: 1.21 NG/DL (ref 0.93–1.7)
TRIGL SERPL-MCNC: 97 MG/DL (ref 0–149)
TSH SERPL DL<=0.005 MIU/L-ACNC: 0.95 UIU/ML (ref 0.38–5.33)
WBC # BLD AUTO: 7.1 K/UL (ref 4.8–10.8)

## 2022-06-01 PROCEDURE — 85025 COMPLETE CBC W/AUTO DIFF WBC: CPT

## 2022-06-01 PROCEDURE — 36415 COLL VENOUS BLD VENIPUNCTURE: CPT

## 2022-06-01 PROCEDURE — 83615 LACTATE (LD) (LDH) ENZYME: CPT

## 2022-06-01 PROCEDURE — 80061 LIPID PANEL: CPT

## 2022-06-01 PROCEDURE — 80053 COMPREHEN METABOLIC PANEL: CPT

## 2022-06-01 PROCEDURE — 84439 ASSAY OF FREE THYROXINE: CPT

## 2022-06-01 PROCEDURE — 84443 ASSAY THYROID STIM HORMONE: CPT

## 2022-06-01 NOTE — PROGRESS NOTES
Pt called wanting reassurance after reading results of scan.  She stated feels a little less stressed and relieved since report was not indicating any metastasis in liver.  Read impression reported by radiologist and agreed with her.  She will be seeing Dr Pérez specialists as well for options on treatment.  Encouraged her to reach out to navigator for additional questions or needs.

## 2022-06-02 ENCOUNTER — TELEPHONE (OUTPATIENT)
Dept: HEMATOLOGY ONCOLOGY | Facility: MEDICAL CENTER | Age: 67
End: 2022-06-02
Payer: MEDICARE

## 2022-06-02 ENCOUNTER — TELEPHONE (OUTPATIENT)
Dept: MEDICAL GROUP | Facility: MEDICAL CENTER | Age: 67
End: 2022-06-02
Payer: MEDICARE

## 2022-06-02 ENCOUNTER — PATIENT OUTREACH (OUTPATIENT)
Dept: OTHER | Facility: MEDICAL CENTER | Age: 67
End: 2022-06-02
Payer: MEDICARE

## 2022-06-02 NOTE — TELEPHONE ENCOUNTER
ANNUAL WELLNESS VISIT PRE-VISIT PLANNING    Phone Number Called: [phone] (home)   Call outcome: Called patient. No answer. Left Voice Message.  Message: Appointment scheduled with Provider Felipe Friday, 06/03/2022, check-in: 12:05 PM, [address], [place].     Left message for patient to call back regarding pre-visit planning. Please transfer call to [phone].      1.  Reviewed notes from the last office visit: Yes    2.  If any orders were ordered or intended to be done prior to visit (i.e. 6 mos follow-up), do we have results/consult notes or has patient scheduled?        •  Labs - Labs ordered, completed on 06/01/2022 and results are in chart.  Note: If patient appointment is for lab review and patient did not complete labs, check with provider if OK to reschedule patient until labs completed.       •  Imaging - Imaging was not ordered at last office visit.       •  Referrals - Referral ordered, patient was seen and consult notes are in chart. Care Teams updated  YES.    -Hematology Oncology: Please reference New Patient Office Visit Progress Notes with Provider Srinath Rooney D.O[facility], encounter date 05/19/2022. Referral Status Authorized    3.  Immunizations were updated in Epic using Reconcile Outside Information activity? Yes       •  Is patient due for Tdap? NO       •  Is patient due for Shingrix? NO    4.  Patient is due for the following Health Maintenance Topics:   Health Maintenance Due   Topic Date Due   • IMM PNEUMOCOCCAL VACCINE: 65+ Years (1 - PCV) Never done   • MAMMOGRAM  09/14/2021   • COLORECTAL CANCER SCREENING  04/08/2022       5.  Reviewed/Updated the following with patient:       •   Preferred Pharmacy? Called patient, no answer, not reviewed with patient.        •   Preferred Lab? Called patient, no answer, not reviewed with patient.        •   Preferred Communication? Called patient, no answer, not reviewed with patient.        •   Allergies? Called patient, no  answer, not reviewed with patient.        •   Medications? Called patient, no answer, not reviewed with patient.        •   Social History? Called patient, no answer, not reviewed with patient.        •   Family History (document living status of immediate family members and if + hx of  cancer, diabetes, hypertension, hyperlipidemia, heart attack, stroke) Called patient, no answer, not reviewed with patient.     6.  Care Team Updated:       •   DME Company (gait device, O2, CPAP, etc.): Called patient, no answer, not reviewed with patient.        •   Other Specialists (eye doctor, derm, GYN, cardiology, endo, etc): Called patient, no answer, not reviewed with patient.     7.  Patient was not advised: “This is a free wellness visit. The provider will screen for medical conditions to help you stay healthy. If you have other concerns to address you may be asked to discuss these at a separate visit or there may be an additional fee.”  Called patient, no answer, not reviewed with patient.     8.  AHA (Puls[employer]) form printed for Provider? No, patient does not have any open alerts, Compliant

## 2022-06-02 NOTE — TELEPHONE ENCOUNTER
Per the request of Dr. Rooney, second attempt to reach pt via telephone regarding her recent MRI results. Detailed voicemail left per Dr. Rooney, MRI of her liver did not show metastases, advised pt to start treatment in [place] asap per previous discussion with provider, and referrals placed by provider previously. Pt asked to return RN phone call, office contact information left.

## 2022-06-02 NOTE — PROGRESS NOTES
On June 2nd, 2022, Oncology Social Worker Lauren Lo attempted telephone contact with pt.  OSW Wally left voicemail message for pt. requesting pt. call back at earliest convenience.  OSW Wally left contact information in voicemail message.

## 2022-06-03 ENCOUNTER — OFFICE VISIT (OUTPATIENT)
Dept: MEDICAL GROUP | Facility: MEDICAL CENTER | Age: 67
End: 2022-06-03
Payer: MEDICARE

## 2022-06-03 VITALS
RESPIRATION RATE: 16 BRPM | DIASTOLIC BLOOD PRESSURE: 72 MMHG | WEIGHT: 164 LBS | HEIGHT: 66 IN | SYSTOLIC BLOOD PRESSURE: 124 MMHG | TEMPERATURE: 98.2 F | HEART RATE: 67 BPM | BODY MASS INDEX: 26.36 KG/M2 | OXYGEN SATURATION: 96 %

## 2022-06-03 DIAGNOSIS — M46.1 SACROILIITIS, NOT ELSEWHERE CLASSIFIED (HCC): ICD-10-CM

## 2022-06-03 DIAGNOSIS — Z72.0 TOBACCO ABUSE: ICD-10-CM

## 2022-06-03 DIAGNOSIS — M81.0 AGE-RELATED OSTEOPOROSIS WITHOUT CURRENT PATHOLOGICAL FRACTURE: ICD-10-CM

## 2022-06-03 DIAGNOSIS — Z12.31 ENCOUNTER FOR SCREENING MAMMOGRAM FOR MALIGNANT NEOPLASM OF BREAST: ICD-10-CM

## 2022-06-03 DIAGNOSIS — Z86.69 HISTORY OF SEIZURE DISORDER: ICD-10-CM

## 2022-06-03 DIAGNOSIS — E03.9 ACQUIRED HYPOTHYROIDISM: ICD-10-CM

## 2022-06-03 DIAGNOSIS — G25.81 RESTLESS LEG SYNDROME: ICD-10-CM

## 2022-06-03 DIAGNOSIS — C69.41 ANTERIOR UVEAL MELANOMA OF RIGHT EYE (HCC): ICD-10-CM

## 2022-06-03 DIAGNOSIS — F41.1 GAD (GENERALIZED ANXIETY DISORDER): ICD-10-CM

## 2022-06-03 PROCEDURE — G0439 PPPS, SUBSEQ VISIT: HCPCS | Performed by: STUDENT IN AN ORGANIZED HEALTH CARE EDUCATION/TRAINING PROGRAM

## 2022-06-03 RX ORDER — GABAPENTIN 100 MG/1
CAPSULE ORAL
COMMUNITY
Start: 2008-06-20 | End: 2022-08-01

## 2022-06-03 ASSESSMENT — FIBROSIS 4 INDEX: FIB4 SCORE: 1.41

## 2022-06-03 ASSESSMENT — ENCOUNTER SYMPTOMS: GENERAL WELL-BEING: GOOD

## 2022-06-03 ASSESSMENT — ACTIVITIES OF DAILY LIVING (ADL): BATHING_REQUIRES_ASSISTANCE: 0

## 2022-06-03 NOTE — PROGRESS NOTES
Chief Complaint   Patient presents with   • Annual Exam       HPI:  Malaika Garcia is a 67 y.o. here for Medicare Annual Wellness Visit     Chorodial mass lesion OD  Patient referred to ocular oncologist in [place].  She has follow-up next week.    Sacroiliitis  Patient no longer following with pain management.  Patient has been off Norco for a year.    Smoking  Patient quit smoking approximately 1 year ago.    Stress incontinence of urine  Patient continues to have stress incontinence.  Patient is following with u[facility] [place] and working on solutions for this problem.  Patient continues to wear briefs.     Age-related osteoporosis without current pathological fracture  Patient has history of osteoporosis based on a bone density scan from 2018 showing osteoporosis with high risk of fracture.  Patient previously using Fosamax but stopped due to GI upset.  Patient has been seeing infusion clinic for Prolia injections.  Patient continues on calcium and vitamin D supplement.     YOSSI (generalized anxiety disorder)  Patient has been well controlled on sertraline 75 mg.  Due to recent diagnosis patient notes that her anxiety has been worse and she has had a difficult time sleeping.  Patient is interested in increasing her sertraline to 100 mg.  We will try increase.     Acquired hypothyroidism  Most recent TSH therapeutic on current dose of levothyroxine     Restless leg syndrome  Patient using Mirapex 1 mg twice a day for years and states this is helpful.  Patient states that she needs trazodone in order to sleep because if she lays there awake the restless leg syndrome increases and makes it impossible to sleep.  Patient using trazodone 50 mg.     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 180 Tablet 3   • estradiol (ESTRACE) 0.1 MG/GM vaginal cream estradiol 0.01% (0.1 mg/gram) vaginal cream     • levothyroxine (SYNTHROID) 50 MCG Tab Take 1 tablet by mouth every morning on an empty stomach. 90 tablet 3   • traZODone (DESYREL) 50 MG Tab TAKE 1 TABLET BY MOUTH EVERY DAY AT BEDTIME AS NEEDED FOR SLEEP 90 tablet 3   • pramipexole (MIRAPEX) 1 MG Tab Take 1 tablet by mouth 2 times a day. TAKE 1 TABLET BY MOUTH TWICE A  tablet 3   • CALCIUM PO Take  by mouth.     • magnesium gluconate (MAG-G) 500 MG tablet Take 500 mg by mouth 3 times a day.     • Multiple Vitamins-Minerals (MULTIVITAMIN ADULT PO) Take  by mouth.     • Cholecalciferol (VITAMIN D3) 1000 units Cap Take  by mouth.     • bumetanide (BUMEX) 1 MG Tab Take 1 Tab by mouth every day. 30 Tab 2     No current facility-administered medications for this visit.          Current supplements as per medication list.     Allergies: Keflex and Sulfa drugs    Current social contact/activities: Yes, camping, workin in yard, chickens    She  reports that she quit smoking about 18 months ago. Her smoking use included cigarettes. She has a 20.00 pack-year smoking history. She has never used smokeless tobacco. She reports current alcohol use of about 1.8 oz of alcohol per week. She reports previous drug use. Drug: Marijuana.  Counseling given: Yes  Comment: quit 1.5 years ago      DPA/Advanced Directive:  Patient has Advanced Directive on file.     ROS:    Gait: Uses no assistive device  Ostomy:  No  Other tubes: No  Amputations: No  Chronic oxygen use: No  Last eye exam: May 5  Wears hearing aids: No   : Denies any urinary leakage during the last 6 months    Screening:    Depression Screening  Little interest or pleasure in doing things?     Feeling down, depressed , or hopeless?    Trouble falling or staying asleep, or sleeping too much?     Feeling tired or having little energy?     Poor appetite or overeating?     Feeling bad about yourself - or that you are a failure or have let yourself or your family down?    Trouble concentrating on things, such as reading the newspaper or watching television?    Moving or speaking so slowly that other people could have noticed.  Or the opposite - being so fidgety or restless that you have been moving around a lot more than usual?     Thoughts that you would be better off dead, or of hurting yourself?     Patient Health Questionnaire Score:      If depressive symptoms identified deferred to follow up visit unless specifically addressed in assessment and plan.    Interpretation of PHQ-9 Total Score   Score Severity   1-4 No Depression   5-9 Mild Depression   10-14 Moderate Depression   15-19 Moderately Severe Depression   20-27 Severe Depression    Screening for Cognitive Impairment  Three Minute Recall (daughter, heaven, mountain) 3/3    Rich clock face with all 12 numbers and set the hands to show 10 past 11.  Yes    Cognitive concerns identified deferred for follow up unless specifically addressed in assessment and plan.    Fall Risk Assessment  Has the patient had two or more falls in the last year or any fall with injury in the last year?  Yes    Safety Assessment  Throw rugs on floor.  Yes  Handrails on all stairs.  Yes  Good lighting in all hallways.  Yes  Difficulty hearing.  No  Patient counseled about all safety risks that were identified.    Functional Assessment ADLs  Are there any barriers preventing you from cooking for yourself or meeting nutritional  needs?  No.    Are there any barriers preventing you from driving safely or obtaining transportation?  No.    Are there any barriers preventing you from using a telephone or calling for help?  No.    Are there any barriers preventing you from shopping?  No.    Are there any barriers preventing you from taking care of your own finances?  No.    Are there any barriers preventing you from managing your medications?  No.    Are there any barriers preventing you from showering, bathing or dressing yourself? No.    Are you currently engaging in any exercise or physical activity?  Yes.     What is your perception of your health? Good.    Health Maintenance Summary          Overdue - IMM PNEUMOCOCCAL VACCINE: 65+ Years (1 - PCV) Overdue - never done    No completion history exists for this topic.          Ordered - MAMMOGRAM (Yearly) Ordered on 6/3/2022    09/14/2020  MA-SCREENING MAMMO BILAT W/TOMOSYNTHESIS W/CAD    07/25/2019  MA-SCREENING MAMMO BILAT W/TOMOSYNTHESIS W/CAD    01/03/2018  MA-SCREEN MAMMO W/CAD-BILAT          Overdue - COLORECTAL CANCER SCREENING (COLONOSCOPY - Every 3 Years) Overdue since 4/8/2022 04/08/2019  REFERRAL TO GI FOR COLONOSCOPY          Annual Wellness Visit (Every 366 Days) Next due on 7/22/2022 07/21/2021  Level of Service: MO ANNUAL WELLNESS VISIT-INCLUDES PPPS SUBSEQUE*    07/21/2021  Visit Dx: Encounter for Medicare annual wellness exam    10/20/2020  Subsequent Annual Wellness Visit - Includes PPPS ()    10/20/2020  Visit Dx: Medicare annual wellness visit, subsequent    05/15/2019  Subsequent Annual Wellness Visit - Includes PPPS ()    Only the first 5 history entries have been loaded, but more history exists.          IMM INFLUENZA (Season Ended) Next due on 9/1/2022    No completion history exists for this topic.          BONE DENSITY (Every 5 Years) Next due on 6/20/2023 06/20/2018  DS-BONE DENSITY STUDY (DEXA)          IMM DTaP/Tdap/Td Vaccine (2 - Td or Tdap)  Next due on 2019  Imm Admin: Tdap Vaccine          HEPATITIS C SCREENING  Completed    2017  HEPATITIS PANEL ACUTE(4 COMPONENTS)          IMM ZOSTER VACCINES (Series Information) Completed    2019  Imm Admin: Zoster Vaccine Recombinant (RZV) (SHINGRIX)    2018  Imm Admin: Zoster Vaccine Recombinant (RZV) (SHINGRIX)          COVID-19 Vaccine (Series Information) Completed    2021  Imm Admin: PFIZER PURPLE CAP SARS-COV-2 VACCINATION (12+)    2021  Imm Admin: Moderna SARS-CoV-2 Vaccine    2021  Imm Admin: Moderna SARS-CoV-2 Vaccine          IMM HEP B VACCINE (Series Information) Aged Out    No completion history exists for this topic.          IMM MENINGOCOCCAL VACCINE (MCV4) (Series Information) Aged Out    No completion history exists for this topic.          Discontinued - PAP SMEAR  Discontinued    No completion history exists for this topic.                Patient Care Team:  Oly Wang P.A.-C. as PCP - General (Physician Assistant)  SAVAGE Luna as PCP - H Paneled  [facility] [place] (Inactive)  Tashia Schaefer as    Srinath Rooney D.O. (Internal Medicine)  Filomena Armstrong R.N. as Nurse Navigator (Hematology & Oncology)      Social History     Tobacco Use   • Smoking status: Former Smoker     Packs/day: 1.00     Years: 20.00     Pack years: 20.00     Types: Cigarettes     Quit date: 2020     Years since quittin.5   • Smokeless tobacco: Never Used   • Tobacco comment: quit 1.5 years ago   Vaping Use   • Vaping Use: Never used   Substance Use Topics   • Alcohol use: Yes     Alcohol/week: 1.8 oz     Types: 3 Standard drinks or equivalent per week     Comment: occasional wine or cocktail   • Drug use: Not Currently     Types: Marijuana     Comment: for pain     Family History   Problem Relation Age of Onset   • Stroke Mother    • Cancer Mother         Throat cancer   • Hypertension Mother    • Cancer Maternal Aunt          "Breast cancer   • Stroke Maternal Grandmother    • Hypertension Maternal Grandmother    • Cancer Paternal Grandfather         Colon cancer     She  has a past medical history of Arthritis, RLS (restless legs syndrome), Seizure (HCC) (08/2013), and Thyroid disease.   Past Surgical History:   Procedure Laterality Date   • THYROIDECTOMY  1994    partial   • DEBRIDEMENT Left        Exam:   /72 (BP Location: Right arm, Patient Position: Sitting, BP Cuff Size: Adult)   Pulse 67   Temp 36.8 °C (98.2 °F) (Temporal)   Resp 16   Ht 1.676 m (5' 6\")   Wt 74.4 kg (164 lb)   SpO2 96%  Body mass index is 26.47 kg/m².    Hearing good.    Dentition good  Alert, oriented in no acute distress.  Eye contact is good, speech goal directed, affect calm    Assessment and Plan. The following treatment and monitoring plan is recommended:    1. Encounter for screening mammogram for malignant neoplasm of breast  - MA-SCREENING MAMMO BILAT W/TOMOSYNTHESIS W/CAD; Future    2. YOSSI (generalized anxiety disorder)  - sertraline (ZOLOFT) 50 MG Tab; Take 2 Tablets by mouth every day.  Dispense: 180 Tablet; Refill: 3    3. 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 Sacroiliitis, not elsewhere classified (HCC)    5. Tobacco abuse    6. History of seizure disorder    7. Age-related osteoporosis without current pathological fracture    8. Restless leg syndrome    9. Acquired hypothyroidism    Other orders  - gabapentin (NEURONTIN) 100 MG Cap      Services suggested: No services needed at this time  Health Care Screening: Age-appropriate preventive services recommended by USPTF and ACIP covered by Medicare were discussed today. Services ordered if indicated and agreed upon by the patient.  Referrals offered: Community-based lifestyle interventions to reduce health risks and promote self-management and wellness, fall prevention, nutrition, physical activity, tobacco-use cessation, weight loss, and mental health services as per orders if " indicated.    Discussion today about general wellness and lifestyle habits:    · Prevent falls and reduce trip hazards; Cautioned about securing or removing rugs.  · Have a working fire alarm and carbon monoxide detector;   · Engage in regular physical activity and social activities     Follow-up: Return in about 3 months (around 9/3/2022).

## 2022-06-13 ENCOUNTER — TELEPHONE (OUTPATIENT)
Dept: HEMATOLOGY ONCOLOGY | Facility: MEDICAL CENTER | Age: 67
End: 2022-06-13
Payer: MEDICARE

## 2022-06-13 DIAGNOSIS — C69.41 ANTERIOR UVEAL MELANOMA OF RIGHT EYE (HCC): ICD-10-CM

## 2022-06-13 NOTE — TELEPHONE ENCOUNTER
Patient called to let us know she saw the specialist and they have her going into surgery next week and will be doing therapy, but she does need a CT scan on the chest w/o contrast as they did not do it on the last one. She stated that she must have it done prior to the surgery and would need Dr. Rooney to order it. I let her know that I can pass on the request to Dr. Rooney and should he approve I would schedule it. She agreed and verbalized understanding

## 2022-06-13 NOTE — TELEPHONE ENCOUNTER
LVM for patient to let her know that CT has been scheduled and to call back with any questions or concerns

## 2022-06-15 ENCOUNTER — HOSPITAL ENCOUNTER (OUTPATIENT)
Dept: LAB | Facility: MEDICAL CENTER | Age: 67
End: 2022-06-15
Attending: OPHTHALMOLOGY
Payer: MEDICARE

## 2022-06-15 LAB — POTASSIUM SERPL-SCNC: 4.8 MMOL/L (ref 3.6–5.5)

## 2022-06-15 PROCEDURE — 84132 ASSAY OF SERUM POTASSIUM: CPT

## 2022-06-15 PROCEDURE — 36415 COLL VENOUS BLD VENIPUNCTURE: CPT

## 2022-06-16 ENCOUNTER — HOSPITAL ENCOUNTER (OUTPATIENT)
Dept: CARDIOLOGY | Facility: MEDICAL CENTER | Age: 67
End: 2022-06-16
Attending: OPHTHALMOLOGY
Payer: MEDICARE

## 2022-06-16 ENCOUNTER — HOSPITAL ENCOUNTER (OUTPATIENT)
Dept: RADIOLOGY | Facility: MEDICAL CENTER | Age: 67
End: 2022-06-16
Attending: STUDENT IN AN ORGANIZED HEALTH CARE EDUCATION/TRAINING PROGRAM
Payer: MEDICARE

## 2022-06-16 DIAGNOSIS — C69.41 ANTERIOR UVEAL MELANOMA OF RIGHT EYE (HCC): ICD-10-CM

## 2022-06-16 LAB — EKG IMPRESSION: NORMAL

## 2022-06-16 PROCEDURE — 93005 ELECTROCARDIOGRAM TRACING: CPT | Performed by: OPHTHALMOLOGY

## 2022-06-16 PROCEDURE — 93010 ELECTROCARDIOGRAM REPORT: CPT | Performed by: INTERNAL MEDICINE

## 2022-06-16 PROCEDURE — 71250 CT THORAX DX C-: CPT | Mod: MG

## 2022-07-25 ENCOUNTER — TELEPHONE (OUTPATIENT)
Dept: HEALTH INFORMATION MANAGEMENT | Facility: OTHER | Age: 67
End: 2022-07-25

## 2022-08-01 ENCOUNTER — HOSPITAL ENCOUNTER (OUTPATIENT)
Dept: RADIOLOGY | Facility: MEDICAL CENTER | Age: 67
End: 2022-08-01
Attending: STUDENT IN AN ORGANIZED HEALTH CARE EDUCATION/TRAINING PROGRAM
Payer: MEDICARE

## 2022-08-01 DIAGNOSIS — Z12.31 ENCOUNTER FOR SCREENING MAMMOGRAM FOR MALIGNANT NEOPLASM OF BREAST: ICD-10-CM

## 2022-08-01 PROCEDURE — 77063 BREAST TOMOSYNTHESIS BI: CPT

## 2022-11-03 ENCOUNTER — TELEPHONE (OUTPATIENT)
Dept: HEMATOLOGY ONCOLOGY | Facility: MEDICAL CENTER | Age: 67
End: 2022-11-03

## 2022-11-03 NOTE — TELEPHONE ENCOUNTER
Left a message for the patient to call and schedule a follow up appt with Dr Rooney, for surveillance.

## 2023-01-01 ENCOUNTER — HOSPITAL ENCOUNTER (EMERGENCY)
Facility: MEDICAL CENTER | Age: 68
End: 2023-11-11
Attending: STUDENT IN AN ORGANIZED HEALTH CARE EDUCATION/TRAINING PROGRAM
Payer: MEDICARE

## 2023-01-01 ENCOUNTER — TELEPHONE (OUTPATIENT)
Dept: HEMATOLOGY ONCOLOGY | Facility: MEDICAL CENTER | Age: 68
End: 2023-01-01

## 2023-01-01 ENCOUNTER — HOSPITAL ENCOUNTER (OUTPATIENT)
Dept: HEMATOLOGY ONCOLOGY | Facility: MEDICAL CENTER | Age: 68
End: 2023-11-06
Attending: STUDENT IN AN ORGANIZED HEALTH CARE EDUCATION/TRAINING PROGRAM
Payer: MEDICARE

## 2023-01-01 ENCOUNTER — HOME CARE VISIT (OUTPATIENT)
Dept: HOSPICE | Facility: HOSPICE | Age: 68
End: 2023-01-01
Payer: MEDICARE

## 2023-01-01 ENCOUNTER — TELEPHONE (OUTPATIENT)
Dept: HEMATOLOGY ONCOLOGY | Facility: MEDICAL CENTER | Age: 68
End: 2023-01-01
Payer: MEDICARE

## 2023-01-01 ENCOUNTER — TELEPHONE (OUTPATIENT)
Dept: HEALTH INFORMATION MANAGEMENT | Facility: OTHER | Age: 68
End: 2023-01-01

## 2023-01-01 ENCOUNTER — APPOINTMENT (OUTPATIENT)
Dept: RADIOLOGY | Facility: MEDICAL CENTER | Age: 68
DRG: 378 | End: 2023-01-01
Attending: FAMILY MEDICINE
Payer: MEDICARE

## 2023-01-01 ENCOUNTER — PHARMACY VISIT (OUTPATIENT)
Dept: PHARMACY | Facility: MEDICAL CENTER | Age: 68
End: 2023-01-01
Payer: COMMERCIAL

## 2023-01-01 ENCOUNTER — APPOINTMENT (OUTPATIENT)
Dept: RADIOLOGY | Facility: MEDICAL CENTER | Age: 68
DRG: 378 | End: 2023-01-01
Attending: EMERGENCY MEDICINE
Payer: MEDICARE

## 2023-01-01 ENCOUNTER — TELEMEDICINE (OUTPATIENT)
Dept: MEDICAL GROUP | Facility: MEDICAL CENTER | Age: 68
End: 2023-01-01
Payer: MEDICARE

## 2023-01-01 ENCOUNTER — PHARMACY VISIT (OUTPATIENT)
Dept: PHARMACY | Facility: MEDICAL CENTER | Age: 68
End: 2023-01-01

## 2023-01-01 ENCOUNTER — PATIENT OUTREACH (OUTPATIENT)
Dept: MEDICAL GROUP | Facility: MEDICAL CENTER | Age: 68
End: 2023-01-01
Payer: MEDICARE

## 2023-01-01 ENCOUNTER — ANESTHESIA EVENT (OUTPATIENT)
Dept: SURGERY | Facility: MEDICAL CENTER | Age: 68
DRG: 378 | End: 2023-01-01
Payer: MEDICARE

## 2023-01-01 ENCOUNTER — ANESTHESIA (OUTPATIENT)
Dept: SURGERY | Facility: MEDICAL CENTER | Age: 68
DRG: 378 | End: 2023-01-01
Payer: MEDICARE

## 2023-01-01 ENCOUNTER — HOSPICE ADMISSION (OUTPATIENT)
Dept: HOSPICE | Facility: HOSPICE | Age: 68
End: 2023-01-01
Payer: MEDICARE

## 2023-01-01 ENCOUNTER — HOSPITAL ENCOUNTER (INPATIENT)
Facility: MEDICAL CENTER | Age: 68
LOS: 5 days | DRG: 378 | End: 2023-10-29
Attending: EMERGENCY MEDICINE | Admitting: STUDENT IN AN ORGANIZED HEALTH CARE EDUCATION/TRAINING PROGRAM
Payer: MEDICARE

## 2023-01-01 VITALS — RESPIRATION RATE: 16 BRPM

## 2023-01-01 VITALS
WEIGHT: 180 LBS | TEMPERATURE: 97.4 F | OXYGEN SATURATION: 91 % | BODY MASS INDEX: 28.93 KG/M2 | HEART RATE: 88 BPM | SYSTOLIC BLOOD PRESSURE: 131 MMHG | RESPIRATION RATE: 18 BRPM | DIASTOLIC BLOOD PRESSURE: 60 MMHG | HEIGHT: 66 IN

## 2023-01-01 VITALS
TEMPERATURE: 96.7 F | HEIGHT: 66 IN | WEIGHT: 177.4 LBS | HEART RATE: 82 BPM | SYSTOLIC BLOOD PRESSURE: 118 MMHG | BODY MASS INDEX: 28.51 KG/M2 | DIASTOLIC BLOOD PRESSURE: 78 MMHG | RESPIRATION RATE: 16 BRPM | OXYGEN SATURATION: 94 %

## 2023-01-01 VITALS
WEIGHT: 175 LBS | DIASTOLIC BLOOD PRESSURE: 65 MMHG | RESPIRATION RATE: 16 BRPM | SYSTOLIC BLOOD PRESSURE: 109 MMHG | HEART RATE: 80 BPM | HEIGHT: 66 IN | BODY MASS INDEX: 28.12 KG/M2 | OXYGEN SATURATION: 93 % | TEMPERATURE: 97.6 F

## 2023-01-01 VITALS — WEIGHT: 175 LBS | BODY MASS INDEX: 28.12 KG/M2 | HEIGHT: 66 IN

## 2023-01-01 DIAGNOSIS — Z51.5 HOSPICE CARE: ICD-10-CM

## 2023-01-01 DIAGNOSIS — C43.9 METASTATIC MELANOMA (HCC): ICD-10-CM

## 2023-01-01 DIAGNOSIS — K62.6 RECTAL ULCER: ICD-10-CM

## 2023-01-01 DIAGNOSIS — R56.9 SEIZURE (HCC): ICD-10-CM

## 2023-01-01 DIAGNOSIS — C69.41 ANTERIOR UVEAL MELANOMA OF RIGHT EYE (HCC): ICD-10-CM

## 2023-01-01 DIAGNOSIS — R10.13 EPIGASTRIC PAIN: ICD-10-CM

## 2023-01-01 DIAGNOSIS — G89.3 CANCER ASSOCIATED PAIN: ICD-10-CM

## 2023-01-01 DIAGNOSIS — C43.9 METASTATIC MELANOMA (HCC): Primary | ICD-10-CM

## 2023-01-01 DIAGNOSIS — K92.2 GASTROINTESTINAL HEMORRHAGE, UNSPECIFIED GASTROINTESTINAL HEMORRHAGE TYPE: ICD-10-CM

## 2023-01-01 DIAGNOSIS — G89.3 CANCER RELATED PAIN: ICD-10-CM

## 2023-01-01 DIAGNOSIS — C79.9 METASTATIC MALIGNANT NEOPLASM, UNSPECIFIED SITE (HCC): ICD-10-CM

## 2023-01-01 DIAGNOSIS — K25.7 CHRONIC GASTRIC ULCER WITHOUT HEMORRHAGE AND WITHOUT PERFORATION: ICD-10-CM

## 2023-01-01 LAB
ALBUMIN SERPL BCP-MCNC: 2.6 G/DL (ref 3.2–4.9)
ALBUMIN SERPL BCP-MCNC: 2.6 G/DL (ref 3.2–4.9)
ALBUMIN SERPL BCP-MCNC: 2.9 G/DL (ref 3.2–4.9)
ALBUMIN SERPL BCP-MCNC: 3.5 G/DL (ref 3.2–4.9)
ALBUMIN SERPL BCP-MCNC: 3.6 G/DL (ref 3.2–4.9)
ALBUMIN/GLOB SERPL: 0.8 G/DL
ALBUMIN/GLOB SERPL: 1 G/DL
ALBUMIN/GLOB SERPL: 1.1 G/DL
ALP SERPL-CCNC: 200 U/L (ref 30–99)
ALP SERPL-CCNC: 200 U/L (ref 30–99)
ALP SERPL-CCNC: 229 U/L (ref 30–99)
ALP SERPL-CCNC: 249 U/L (ref 30–99)
ALP SERPL-CCNC: 279 U/L (ref 30–99)
ALT SERPL-CCNC: 35 U/L (ref 2–50)
ALT SERPL-CCNC: 43 U/L (ref 2–50)
ALT SERPL-CCNC: 53 U/L (ref 2–50)
ALT SERPL-CCNC: 58 U/L (ref 2–50)
ALT SERPL-CCNC: 81 U/L (ref 2–50)
AMPHET UR QL SCN: NEGATIVE
ANION GAP SERPL CALC-SCNC: 10 MMOL/L (ref 7–16)
ANION GAP SERPL CALC-SCNC: 10 MMOL/L (ref 7–16)
ANION GAP SERPL CALC-SCNC: 11 MMOL/L (ref 7–16)
ANION GAP SERPL CALC-SCNC: 11 MMOL/L (ref 7–16)
ANION GAP SERPL CALC-SCNC: 12 MMOL/L (ref 7–16)
ANION GAP SERPL CALC-SCNC: 17 MMOL/L (ref 7–16)
APPEARANCE UR: CLEAR
AST SERPL-CCNC: 106 U/L (ref 12–45)
AST SERPL-CCNC: 121 U/L (ref 12–45)
AST SERPL-CCNC: 202 U/L (ref 12–45)
AST SERPL-CCNC: 59 U/L (ref 12–45)
AST SERPL-CCNC: 85 U/L (ref 12–45)
BACTERIA #/AREA URNS HPF: NEGATIVE /HPF
BACTERIA UR CULT: NORMAL
BARBITURATES UR QL SCN: NEGATIVE
BASOPHILS # BLD AUTO: 0.5 % (ref 0–1.8)
BASOPHILS # BLD AUTO: 0.5 % (ref 0–1.8)
BASOPHILS # BLD AUTO: 0.6 % (ref 0–1.8)
BASOPHILS # BLD: 0.04 K/UL (ref 0–0.12)
BASOPHILS # BLD: 0.05 K/UL (ref 0–0.12)
BASOPHILS # BLD: 0.06 K/UL (ref 0–0.12)
BENZODIAZ UR QL SCN: NEGATIVE
BILIRUB SERPL-MCNC: 0.3 MG/DL (ref 0.1–1.5)
BILIRUB SERPL-MCNC: 0.4 MG/DL (ref 0.1–1.5)
BILIRUB SERPL-MCNC: 0.5 MG/DL (ref 0.1–1.5)
BILIRUB SERPL-MCNC: 0.6 MG/DL (ref 0.1–1.5)
BILIRUB SERPL-MCNC: 0.7 MG/DL (ref 0.1–1.5)
BILIRUB UR QL STRIP.AUTO: NEGATIVE
BUN SERPL-MCNC: 10 MG/DL (ref 8–22)
BUN SERPL-MCNC: 10 MG/DL (ref 8–22)
BUN SERPL-MCNC: 14 MG/DL (ref 8–22)
BUN SERPL-MCNC: 14 MG/DL (ref 8–22)
BUN SERPL-MCNC: 16 MG/DL (ref 8–22)
BUN SERPL-MCNC: 9 MG/DL (ref 8–22)
BZE UR QL SCN: NEGATIVE
CALCIUM ALBUM COR SERPL-MCNC: 11 MG/DL (ref 8.5–10.5)
CALCIUM ALBUM COR SERPL-MCNC: 8.8 MG/DL (ref 8.5–10.5)
CALCIUM ALBUM COR SERPL-MCNC: 8.8 MG/DL (ref 8.5–10.5)
CALCIUM ALBUM COR SERPL-MCNC: 9.3 MG/DL (ref 8.5–10.5)
CALCIUM ALBUM COR SERPL-MCNC: 9.3 MG/DL (ref 8.5–10.5)
CALCIUM SERPL-MCNC: 10.6 MG/DL (ref 8.5–10.5)
CALCIUM SERPL-MCNC: 7.7 MG/DL (ref 8.5–10.5)
CALCIUM SERPL-MCNC: 8.2 MG/DL (ref 8.5–10.5)
CALCIUM SERPL-MCNC: 8.2 MG/DL (ref 8.5–10.5)
CALCIUM SERPL-MCNC: 8.4 MG/DL (ref 8.5–10.5)
CALCIUM SERPL-MCNC: 8.5 MG/DL (ref 8.5–10.5)
CANNABINOIDS UR QL SCN: NEGATIVE
CHLORIDE SERPL-SCNC: 101 MMOL/L (ref 96–112)
CHLORIDE SERPL-SCNC: 102 MMOL/L (ref 96–112)
CHLORIDE SERPL-SCNC: 107 MMOL/L (ref 96–112)
CHLORIDE SERPL-SCNC: 99 MMOL/L (ref 96–112)
CO2 SERPL-SCNC: 19 MMOL/L (ref 20–33)
CO2 SERPL-SCNC: 20 MMOL/L (ref 20–33)
CO2 SERPL-SCNC: 21 MMOL/L (ref 20–33)
CO2 SERPL-SCNC: 22 MMOL/L (ref 20–33)
CO2 SERPL-SCNC: 23 MMOL/L (ref 20–33)
CO2 SERPL-SCNC: 24 MMOL/L (ref 20–33)
COLOR UR: ABNORMAL
CREAT SERPL-MCNC: 0.73 MG/DL (ref 0.5–1.4)
CREAT SERPL-MCNC: 0.77 MG/DL (ref 0.5–1.4)
CREAT SERPL-MCNC: 0.94 MG/DL (ref 0.5–1.4)
CREAT SERPL-MCNC: 0.94 MG/DL (ref 0.5–1.4)
CREAT SERPL-MCNC: 0.97 MG/DL (ref 0.5–1.4)
CREAT SERPL-MCNC: 1 MG/DL (ref 0.5–1.4)
EOSINOPHIL # BLD AUTO: 0.01 K/UL (ref 0–0.51)
EOSINOPHIL # BLD AUTO: 0.02 K/UL (ref 0–0.51)
EOSINOPHIL # BLD AUTO: 0.05 K/UL (ref 0–0.51)
EOSINOPHIL NFR BLD: 0.1 % (ref 0–6.9)
EOSINOPHIL NFR BLD: 0.2 % (ref 0–6.9)
EOSINOPHIL NFR BLD: 0.7 % (ref 0–6.9)
EPI CELLS #/AREA URNS HPF: ABNORMAL /HPF
ERYTHROCYTE [DISTWIDTH] IN BLOOD BY AUTOMATED COUNT: 44.6 FL (ref 35.9–50)
ERYTHROCYTE [DISTWIDTH] IN BLOOD BY AUTOMATED COUNT: 46.6 FL (ref 35.9–50)
ERYTHROCYTE [DISTWIDTH] IN BLOOD BY AUTOMATED COUNT: 47 FL (ref 35.9–50)
ERYTHROCYTE [DISTWIDTH] IN BLOOD BY AUTOMATED COUNT: 47 FL (ref 35.9–50)
ERYTHROCYTE [DISTWIDTH] IN BLOOD BY AUTOMATED COUNT: 47.3 FL (ref 35.9–50)
ERYTHROCYTE [DISTWIDTH] IN BLOOD BY AUTOMATED COUNT: 47.3 FL (ref 35.9–50)
ERYTHROCYTE [DISTWIDTH] IN BLOOD BY AUTOMATED COUNT: 47.8 FL (ref 35.9–50)
ETHANOL BLD-MCNC: <10.1 MG/DL
FENTANYL UR QL: NEGATIVE
FLUAV RNA SPEC QL NAA+PROBE: NEGATIVE
FLUBV RNA SPEC QL NAA+PROBE: NEGATIVE
GFR SERPLBLD CREATININE-BSD FMLA CKD-EPI: 60 ML/MIN/1.73 M 2
GFR SERPLBLD CREATININE-BSD FMLA CKD-EPI: 63 ML/MIN/1.73 M 2
GFR SERPLBLD CREATININE-BSD FMLA CKD-EPI: 65 ML/MIN/1.73 M 2
GFR SERPLBLD CREATININE-BSD FMLA CKD-EPI: 65 ML/MIN/1.73 M 2
GFR SERPLBLD CREATININE-BSD FMLA CKD-EPI: 83 ML/MIN/1.73 M 2
GFR SERPLBLD CREATININE-BSD FMLA CKD-EPI: 88 ML/MIN/1.73 M 2
GLOBULIN SER CALC-MCNC: 3.1 G/DL (ref 1.9–3.5)
GLOBULIN SER CALC-MCNC: 3.2 G/DL (ref 1.9–3.5)
GLOBULIN SER CALC-MCNC: 3.3 G/DL (ref 1.9–3.5)
GLOBULIN SER CALC-MCNC: 3.4 G/DL (ref 1.9–3.5)
GLOBULIN SER CALC-MCNC: 3.6 G/DL (ref 1.9–3.5)
GLUCOSE SERPL-MCNC: 112 MG/DL (ref 65–99)
GLUCOSE SERPL-MCNC: 141 MG/DL (ref 65–99)
GLUCOSE SERPL-MCNC: 143 MG/DL (ref 65–99)
GLUCOSE SERPL-MCNC: 87 MG/DL (ref 65–99)
GLUCOSE SERPL-MCNC: 97 MG/DL (ref 65–99)
GLUCOSE SERPL-MCNC: 98 MG/DL (ref 65–99)
GLUCOSE UR STRIP.AUTO-MCNC: NEGATIVE MG/DL
HCT VFR BLD AUTO: 35 % (ref 37–47)
HCT VFR BLD AUTO: 35.8 % (ref 37–47)
HCT VFR BLD AUTO: 36.8 % (ref 37–47)
HCT VFR BLD AUTO: 37.1 % (ref 37–47)
HCT VFR BLD AUTO: 38.7 % (ref 37–47)
HCT VFR BLD AUTO: 41.4 % (ref 37–47)
HCT VFR BLD AUTO: 42.6 % (ref 37–47)
HGB BLD-MCNC: 11.2 G/DL (ref 12–16)
HGB BLD-MCNC: 11.4 G/DL (ref 12–16)
HGB BLD-MCNC: 11.5 G/DL (ref 12–16)
HGB BLD-MCNC: 11.7 G/DL (ref 12–16)
HGB BLD-MCNC: 12.2 G/DL (ref 12–16)
HGB BLD-MCNC: 12.8 G/DL (ref 12–16)
HGB BLD-MCNC: 14 G/DL (ref 12–16)
HYALINE CASTS #/AREA URNS LPF: ABNORMAL /LPF
IMM GRANULOCYTES # BLD AUTO: 0.03 K/UL (ref 0–0.11)
IMM GRANULOCYTES # BLD AUTO: 0.03 K/UL (ref 0–0.11)
IMM GRANULOCYTES # BLD AUTO: 0.04 K/UL (ref 0–0.11)
IMM GRANULOCYTES NFR BLD AUTO: 0.3 % (ref 0–0.9)
IMM GRANULOCYTES NFR BLD AUTO: 0.4 % (ref 0–0.9)
IMM GRANULOCYTES NFR BLD AUTO: 0.4 % (ref 0–0.9)
KETONES UR STRIP.AUTO-MCNC: 80 MG/DL
LACTATE SERPL-SCNC: 1.2 MMOL/L (ref 0.5–2)
LACTATE SERPL-SCNC: 1.3 MMOL/L (ref 0.5–2)
LACTATE SERPL-SCNC: 1.5 MMOL/L (ref 0.5–2)
LACTATE SERPL-SCNC: 3 MMOL/L (ref 0.5–2)
LEUKOCYTE ESTERASE UR QL STRIP.AUTO: NEGATIVE
LIPASE SERPL-CCNC: 34 U/L (ref 11–82)
LYMPHOCYTES # BLD AUTO: 1.41 K/UL (ref 1–4.8)
LYMPHOCYTES # BLD AUTO: 1.74 K/UL (ref 1–4.8)
LYMPHOCYTES # BLD AUTO: 1.77 K/UL (ref 1–4.8)
LYMPHOCYTES NFR BLD: 15 % (ref 22–41)
LYMPHOCYTES NFR BLD: 17.2 % (ref 22–41)
LYMPHOCYTES NFR BLD: 23.3 % (ref 22–41)
MAGNESIUM SERPL-MCNC: 1.6 MG/DL (ref 1.5–2.5)
MAGNESIUM SERPL-MCNC: 2.2 MG/DL (ref 1.5–2.5)
MCH RBC QN AUTO: 27.4 PG (ref 27–33)
MCH RBC QN AUTO: 27.6 PG (ref 27–33)
MCH RBC QN AUTO: 27.6 PG (ref 27–33)
MCH RBC QN AUTO: 27.9 PG (ref 27–33)
MCH RBC QN AUTO: 27.9 PG (ref 27–33)
MCH RBC QN AUTO: 28.1 PG (ref 27–33)
MCH RBC QN AUTO: 28.2 PG (ref 27–33)
MCHC RBC AUTO-ENTMCNC: 30.9 G/DL (ref 32.2–35.5)
MCHC RBC AUTO-ENTMCNC: 31.3 G/DL (ref 32.2–35.5)
MCHC RBC AUTO-ENTMCNC: 31.5 G/DL (ref 32.2–35.5)
MCHC RBC AUTO-ENTMCNC: 31.5 G/DL (ref 32.2–35.5)
MCHC RBC AUTO-ENTMCNC: 31.8 G/DL (ref 32.2–35.5)
MCHC RBC AUTO-ENTMCNC: 32 G/DL (ref 32.2–35.5)
MCHC RBC AUTO-ENTMCNC: 32.9 G/DL (ref 32.2–35.5)
MCV RBC AUTO: 85.7 FL (ref 81.4–97.8)
MCV RBC AUTO: 87.5 FL (ref 81.4–97.8)
MCV RBC AUTO: 87.6 FL (ref 81.4–97.8)
MCV RBC AUTO: 87.7 FL (ref 81.4–97.8)
MCV RBC AUTO: 87.8 FL (ref 81.4–97.8)
MCV RBC AUTO: 88.5 FL (ref 81.4–97.8)
MCV RBC AUTO: 89.2 FL (ref 81.4–97.8)
METHADONE UR QL SCN: NEGATIVE
MICRO URNS: ABNORMAL
MONOCYTES # BLD AUTO: 0.65 K/UL (ref 0–0.85)
MONOCYTES # BLD AUTO: 0.82 K/UL (ref 0–0.85)
MONOCYTES # BLD AUTO: 1 K/UL (ref 0–0.85)
MONOCYTES NFR BLD AUTO: 10.8 % (ref 0–13.4)
MONOCYTES NFR BLD AUTO: 6.9 % (ref 0–13.4)
MONOCYTES NFR BLD AUTO: 9.9 % (ref 0–13.4)
NEUTROPHILS # BLD AUTO: 4.88 K/UL (ref 1.82–7.42)
NEUTROPHILS # BLD AUTO: 7.25 K/UL (ref 1.82–7.42)
NEUTROPHILS # BLD AUTO: 7.28 K/UL (ref 1.82–7.42)
NEUTROPHILS NFR BLD: 64.3 % (ref 44–72)
NEUTROPHILS NFR BLD: 71.9 % (ref 44–72)
NEUTROPHILS NFR BLD: 77 % (ref 44–72)
NITRITE UR QL STRIP.AUTO: NEGATIVE
NRBC # BLD AUTO: 0 K/UL
NRBC BLD-RTO: 0 /100 WBC (ref 0–0.2)
OPIATES UR QL SCN: NEGATIVE
OXYCODONE UR QL SCN: NEGATIVE
PATHOLOGY CONSULT NOTE: NORMAL
PCP UR QL SCN: NEGATIVE
PH UR STRIP.AUTO: 5 [PH] (ref 5–8)
PHOSPHATE SERPL-MCNC: 2.5 MG/DL (ref 2.5–4.5)
PHOSPHATE SERPL-MCNC: 3.4 MG/DL (ref 2.5–4.5)
PLATELET # BLD AUTO: 228 K/UL (ref 164–446)
PLATELET # BLD AUTO: 238 K/UL (ref 164–446)
PLATELET # BLD AUTO: 253 K/UL (ref 164–446)
PLATELET # BLD AUTO: 306 K/UL (ref 164–446)
PLATELET # BLD AUTO: 307 K/UL (ref 164–446)
PLATELET # BLD AUTO: 308 K/UL (ref 164–446)
PLATELET # BLD AUTO: 335 K/UL (ref 164–446)
PMV BLD AUTO: 10.3 FL (ref 9–12.9)
PMV BLD AUTO: 10.5 FL (ref 9–12.9)
PMV BLD AUTO: 10.6 FL (ref 9–12.9)
PMV BLD AUTO: 10.7 FL (ref 9–12.9)
PMV BLD AUTO: 10.7 FL (ref 9–12.9)
PMV BLD AUTO: 11.1 FL (ref 9–12.9)
PMV BLD AUTO: 11.2 FL (ref 9–12.9)
POTASSIUM SERPL-SCNC: 3.5 MMOL/L (ref 3.6–5.5)
POTASSIUM SERPL-SCNC: 3.6 MMOL/L (ref 3.6–5.5)
POTASSIUM SERPL-SCNC: 3.8 MMOL/L (ref 3.6–5.5)
POTASSIUM SERPL-SCNC: 3.9 MMOL/L (ref 3.6–5.5)
POTASSIUM SERPL-SCNC: 3.9 MMOL/L (ref 3.6–5.5)
POTASSIUM SERPL-SCNC: 4.1 MMOL/L (ref 3.6–5.5)
PROCALCITONIN SERPL-MCNC: 0.31 NG/ML
PROPOXYPH UR QL SCN: NEGATIVE
PROT SERPL-MCNC: 5.7 G/DL (ref 6–8.2)
PROT SERPL-MCNC: 5.8 G/DL (ref 6–8.2)
PROT SERPL-MCNC: 6.5 G/DL (ref 6–8.2)
PROT SERPL-MCNC: 6.9 G/DL (ref 6–8.2)
PROT SERPL-MCNC: 6.9 G/DL (ref 6–8.2)
PROT UR QL STRIP: 100 MG/DL
RBC # BLD AUTO: 3.99 M/UL (ref 4.2–5.4)
RBC # BLD AUTO: 4.09 M/UL (ref 4.2–5.4)
RBC # BLD AUTO: 4.19 M/UL (ref 4.2–5.4)
RBC # BLD AUTO: 4.19 M/UL (ref 4.2–5.4)
RBC # BLD AUTO: 4.42 M/UL (ref 4.2–5.4)
RBC # BLD AUTO: 4.64 M/UL (ref 4.2–5.4)
RBC # BLD AUTO: 4.97 M/UL (ref 4.2–5.4)
RBC # URNS HPF: ABNORMAL /HPF
RBC UR QL AUTO: NEGATIVE
RENAL EPI CELLS #/AREA URNS HPF: ABNORMAL /HPF
RSV RNA SPEC QL NAA+PROBE: NEGATIVE
SARS-COV-2 RNA RESP QL NAA+PROBE: NOTDETECTED
SIGNIFICANT IND 70042: NORMAL
SITE SITE: NORMAL
SODIUM SERPL-SCNC: 133 MMOL/L (ref 135–145)
SODIUM SERPL-SCNC: 134 MMOL/L (ref 135–145)
SODIUM SERPL-SCNC: 135 MMOL/L (ref 135–145)
SODIUM SERPL-SCNC: 136 MMOL/L (ref 135–145)
SODIUM SERPL-SCNC: 136 MMOL/L (ref 135–145)
SODIUM SERPL-SCNC: 139 MMOL/L (ref 135–145)
SOURCE SOURCE: NORMAL
SP GR UR STRIP.AUTO: 1.03
SPECIMEN SOURCE: NORMAL
TSH SERPL DL<=0.005 MIU/L-ACNC: 2.06 UIU/ML (ref 0.38–5.33)
UROBILINOGEN UR STRIP.AUTO-MCNC: 0.2 MG/DL
WBC # BLD AUTO: 10.1 K/UL (ref 4.8–10.8)
WBC # BLD AUTO: 11.1 K/UL (ref 4.8–10.8)
WBC # BLD AUTO: 11.2 K/UL (ref 4.8–10.8)
WBC # BLD AUTO: 7.6 K/UL (ref 4.8–10.8)
WBC # BLD AUTO: 9.4 K/UL (ref 4.8–10.8)
WBC # BLD AUTO: 9.6 K/UL (ref 4.8–10.8)
WBC # BLD AUTO: 9.9 K/UL (ref 4.8–10.8)
WBC #/AREA URNS HPF: ABNORMAL /HPF

## 2023-01-01 PROCEDURE — 99232 SBSQ HOSP IP/OBS MODERATE 35: CPT | Performed by: FAMILY MEDICINE

## 2023-01-01 PROCEDURE — 700111 HCHG RX REV CODE 636 W/ 250 OVERRIDE (IP): Mod: JZ,UD | Performed by: EMERGENCY MEDICINE

## 2023-01-01 PROCEDURE — 80053 COMPREHEN METABOLIC PANEL: CPT

## 2023-01-01 PROCEDURE — 700105 HCHG RX REV CODE 258: Performed by: FAMILY MEDICINE

## 2023-01-01 PROCEDURE — 99284 EMERGENCY DEPT VISIT MOD MDM: CPT

## 2023-01-01 PROCEDURE — 85025 COMPLETE CBC W/AUTO DIFF WBC: CPT

## 2023-01-01 PROCEDURE — G0155 HHCP-SVS OF CSW,EA 15 MIN: HCPCS

## 2023-01-01 PROCEDURE — A9270 NON-COVERED ITEM OR SERVICE: HCPCS | Performed by: FAMILY MEDICINE

## 2023-01-01 PROCEDURE — 70553 MRI BRAIN STEM W/O & W/DYE: CPT

## 2023-01-01 PROCEDURE — 99239 HOSP IP/OBS DSCHRG MGMT >30: CPT | Performed by: FAMILY MEDICINE

## 2023-01-01 PROCEDURE — 0241U HCHG SARS-COV-2 COVID-19 NFCT DS RESP RNA 4 TRGT MIC: CPT

## 2023-01-01 PROCEDURE — 4A00X4Z MEASUREMENT OF CENTRAL NERVOUS ELECTRICAL ACTIVITY, EXTERNAL APPROACH: ICD-10-PCS | Performed by: PSYCHIATRY & NEUROLOGY

## 2023-01-01 PROCEDURE — 87086 URINE CULTURE/COLONY COUNT: CPT

## 2023-01-01 PROCEDURE — 700111 HCHG RX REV CODE 636 W/ 250 OVERRIDE (IP): Performed by: FAMILY MEDICINE

## 2023-01-01 PROCEDURE — 95819 EEG AWAKE AND ASLEEP: CPT | Performed by: PSYCHIATRY & NEUROLOGY

## 2023-01-01 PROCEDURE — S9126 HOSPICE CARE, IN THE HOME, P: HCPCS

## 2023-01-01 PROCEDURE — 99214 OFFICE O/P EST MOD 30 MIN: CPT | Mod: 95 | Performed by: STUDENT IN AN ORGANIZED HEALTH CARE EDUCATION/TRAINING PROGRAM

## 2023-01-01 PROCEDURE — 99285 EMERGENCY DEPT VISIT HI MDM: CPT

## 2023-01-01 PROCEDURE — G0299 HHS/HOSPICE OF RN EA 15 MIN: HCPCS

## 2023-01-01 PROCEDURE — 700102 HCHG RX REV CODE 250 W/ 637 OVERRIDE(OP): Performed by: NURSE PRACTITIONER

## 2023-01-01 PROCEDURE — 96374 THER/PROPH/DIAG INJ IV PUSH: CPT

## 2023-01-01 PROCEDURE — A9270 NON-COVERED ITEM OR SERVICE: HCPCS | Performed by: NURSE PRACTITIONER

## 2023-01-01 PROCEDURE — 700105 HCHG RX REV CODE 258: Mod: UD | Performed by: EMERGENCY MEDICINE

## 2023-01-01 PROCEDURE — 700101 HCHG RX REV CODE 250: Performed by: NURSE PRACTITIONER

## 2023-01-01 PROCEDURE — 82077 ASSAY SPEC XCP UR&BREATH IA: CPT

## 2023-01-01 PROCEDURE — 80307 DRUG TEST PRSMV CHEM ANLYZR: CPT

## 2023-01-01 PROCEDURE — 84100 ASSAY OF PHOSPHORUS: CPT

## 2023-01-01 PROCEDURE — C9113 INJ PANTOPRAZOLE SODIUM, VIA: HCPCS | Performed by: FAMILY MEDICINE

## 2023-01-01 PROCEDURE — 700111 HCHG RX REV CODE 636 W/ 250 OVERRIDE (IP): Mod: JZ | Performed by: STUDENT IN AN ORGANIZED HEALTH CARE EDUCATION/TRAINING PROGRAM

## 2023-01-01 PROCEDURE — 700111 HCHG RX REV CODE 636 W/ 250 OVERRIDE (IP): Performed by: ANESTHESIOLOGY

## 2023-01-01 PROCEDURE — 700102 HCHG RX REV CODE 250 W/ 637 OVERRIDE(OP): Performed by: STUDENT IN AN ORGANIZED HEALTH CARE EDUCATION/TRAINING PROGRAM

## 2023-01-01 PROCEDURE — 81001 URINALYSIS AUTO W/SCOPE: CPT

## 2023-01-01 PROCEDURE — 700111 HCHG RX REV CODE 636 W/ 250 OVERRIDE (IP): Performed by: NURSE PRACTITIONER

## 2023-01-01 PROCEDURE — A9579 GAD-BASE MR CONTRAST NOS,1ML: HCPCS | Performed by: FAMILY MEDICINE

## 2023-01-01 PROCEDURE — 770004 HCHG ROOM/CARE - ONCOLOGY PRIVATE *

## 2023-01-01 PROCEDURE — 85027 COMPLETE CBC AUTOMATED: CPT

## 2023-01-01 PROCEDURE — 160035 HCHG PACU - 1ST 60 MINS PHASE I: Performed by: SPECIALIST

## 2023-01-01 PROCEDURE — 99497 ADVNCD CARE PLAN 30 MIN: CPT | Mod: 25 | Performed by: STUDENT IN AN ORGANIZED HEALTH CARE EDUCATION/TRAINING PROGRAM

## 2023-01-01 PROCEDURE — A9270 NON-COVERED ITEM OR SERVICE: HCPCS | Mod: JZ | Performed by: FAMILY MEDICINE

## 2023-01-01 PROCEDURE — 74177 CT ABD & PELVIS W/CONTRAST: CPT

## 2023-01-01 PROCEDURE — 700101 HCHG RX REV CODE 250: Performed by: SPECIALIST

## 2023-01-01 PROCEDURE — 36415 COLL VENOUS BLD VENIPUNCTURE: CPT

## 2023-01-01 PROCEDURE — 700117 HCHG RX CONTRAST REV CODE 255: Performed by: FAMILY MEDICINE

## 2023-01-01 PROCEDURE — 88312 SPECIAL STAINS GROUP 1: CPT

## 2023-01-01 PROCEDURE — 84443 ASSAY THYROID STIM HORMONE: CPT

## 2023-01-01 PROCEDURE — 0DBP8ZX EXCISION OF RECTUM, VIA NATURAL OR ARTIFICIAL OPENING ENDOSCOPIC, DIAGNOSTIC: ICD-10-PCS | Performed by: SPECIALIST

## 2023-01-01 PROCEDURE — 110371 HCHG SHELL REV 272: Performed by: SPECIALIST

## 2023-01-01 PROCEDURE — 700102 HCHG RX REV CODE 250 W/ 637 OVERRIDE(OP): Performed by: FAMILY MEDICINE

## 2023-01-01 PROCEDURE — 160009 HCHG ANES TIME/MIN: Performed by: SPECIALIST

## 2023-01-01 PROCEDURE — 99233 SBSQ HOSP IP/OBS HIGH 50: CPT | Performed by: STUDENT IN AN ORGANIZED HEALTH CARE EDUCATION/TRAINING PROGRAM

## 2023-01-01 PROCEDURE — 99222 1ST HOSP IP/OBS MODERATE 55: CPT | Performed by: INTERNAL MEDICINE

## 2023-01-01 PROCEDURE — RXMED WILLOW AMBULATORY MEDICATION CHARGE: Performed by: STUDENT IN AN ORGANIZED HEALTH CARE EDUCATION/TRAINING PROGRAM

## 2023-01-01 PROCEDURE — 99212 OFFICE O/P EST SF 10 MIN: CPT | Performed by: STUDENT IN AN ORGANIZED HEALTH CARE EDUCATION/TRAINING PROGRAM

## 2023-01-01 PROCEDURE — 700102 HCHG RX REV CODE 250 W/ 637 OVERRIDE(OP): Mod: JZ | Performed by: FAMILY MEDICINE

## 2023-01-01 PROCEDURE — A9270 NON-COVERED ITEM OR SERVICE: HCPCS | Mod: UD | Performed by: STUDENT IN AN ORGANIZED HEALTH CARE EDUCATION/TRAINING PROGRAM

## 2023-01-01 PROCEDURE — 0DBK8ZZ EXCISION OF ASCENDING COLON, VIA NATURAL OR ARTIFICIAL OPENING ENDOSCOPIC: ICD-10-PCS | Performed by: SPECIALIST

## 2023-01-01 PROCEDURE — 43239 EGD BIOPSY SINGLE/MULTIPLE: CPT | Performed by: SPECIALIST

## 2023-01-01 PROCEDURE — 80048 BASIC METABOLIC PNL TOTAL CA: CPT

## 2023-01-01 PROCEDURE — C9113 INJ PANTOPRAZOLE SODIUM, VIA: HCPCS | Performed by: STUDENT IN AN ORGANIZED HEALTH CARE EDUCATION/TRAINING PROGRAM

## 2023-01-01 PROCEDURE — 160208 HCHG ENDO MINUTES - EA ADDL 1 MIN LEVEL 4: Performed by: SPECIALIST

## 2023-01-01 PROCEDURE — 83690 ASSAY OF LIPASE: CPT

## 2023-01-01 PROCEDURE — 74181 MRI ABDOMEN W/O CONTRAST: CPT | Mod: 52

## 2023-01-01 PROCEDURE — 96375 TX/PRO/DX INJ NEW DRUG ADDON: CPT

## 2023-01-01 PROCEDURE — 88305 TISSUE EXAM BY PATHOLOGIST: CPT | Mod: 59

## 2023-01-01 PROCEDURE — 83605 ASSAY OF LACTIC ACID: CPT | Mod: 91

## 2023-01-01 PROCEDURE — 83735 ASSAY OF MAGNESIUM: CPT

## 2023-01-01 PROCEDURE — 700102 HCHG RX REV CODE 250 W/ 637 OVERRIDE(OP): Mod: UD | Performed by: STUDENT IN AN ORGANIZED HEALTH CARE EDUCATION/TRAINING PROGRAM

## 2023-01-01 PROCEDURE — 95819 EEG AWAKE AND ASLEEP: CPT | Mod: 26 | Performed by: PSYCHIATRY & NEUROLOGY

## 2023-01-01 PROCEDURE — 83605 ASSAY OF LACTIC ACID: CPT

## 2023-01-01 PROCEDURE — 99232 SBSQ HOSP IP/OBS MODERATE 35: CPT | Performed by: NURSE PRACTITIONER

## 2023-01-01 PROCEDURE — 700111 HCHG RX REV CODE 636 W/ 250 OVERRIDE (IP): Mod: UD | Performed by: STUDENT IN AN ORGANIZED HEALTH CARE EDUCATION/TRAINING PROGRAM

## 2023-01-01 PROCEDURE — 160203 HCHG ENDO MINUTES - 1ST 30 MINS LEVEL 4: Performed by: SPECIALIST

## 2023-01-01 PROCEDURE — 84145 PROCALCITONIN (PCT): CPT

## 2023-01-01 PROCEDURE — 700117 HCHG RX CONTRAST REV CODE 255: Performed by: EMERGENCY MEDICINE

## 2023-01-01 PROCEDURE — 70450 CT HEAD/BRAIN W/O DYE: CPT

## 2023-01-01 PROCEDURE — 0DB78ZX EXCISION OF STOMACH, PYLORUS, VIA NATURAL OR ARTIFICIAL OPENING ENDOSCOPIC, DIAGNOSTIC: ICD-10-PCS | Performed by: SPECIALIST

## 2023-01-01 PROCEDURE — 160002 HCHG RECOVERY MINUTES (STAT): Performed by: SPECIALIST

## 2023-01-01 PROCEDURE — 99233 SBSQ HOSP IP/OBS HIGH 50: CPT | Performed by: FAMILY MEDICINE

## 2023-01-01 PROCEDURE — A9270 NON-COVERED ITEM OR SERVICE: HCPCS | Performed by: STUDENT IN AN ORGANIZED HEALTH CARE EDUCATION/TRAINING PROGRAM

## 2023-01-01 PROCEDURE — 71045 X-RAY EXAM CHEST 1 VIEW: CPT

## 2023-01-01 PROCEDURE — RXMED WILLOW AMBULATORY MEDICATION CHARGE: Performed by: FAMILY MEDICINE

## 2023-01-01 PROCEDURE — 99223 1ST HOSP IP/OBS HIGH 75: CPT | Mod: AI,25 | Performed by: STUDENT IN AN ORGANIZED HEALTH CARE EDUCATION/TRAINING PROGRAM

## 2023-01-01 PROCEDURE — 99214 OFFICE O/P EST MOD 30 MIN: CPT | Performed by: STUDENT IN AN ORGANIZED HEALTH CARE EDUCATION/TRAINING PROGRAM

## 2023-01-01 PROCEDURE — 97165 OT EVAL LOW COMPLEX 30 MIN: CPT

## 2023-01-01 PROCEDURE — C9803 HOPD COVID-19 SPEC COLLECT: HCPCS | Performed by: EMERGENCY MEDICINE

## 2023-01-01 PROCEDURE — 700111 HCHG RX REV CODE 636 W/ 250 OVERRIDE (IP): Performed by: STUDENT IN AN ORGANIZED HEALTH CARE EDUCATION/TRAINING PROGRAM

## 2023-01-01 PROCEDURE — 665036 HSPC NOTICE OF ELECTION NOE

## 2023-01-01 PROCEDURE — 45380 COLONOSCOPY AND BIOPSY: CPT | Performed by: SPECIALIST

## 2023-01-01 PROCEDURE — 160048 HCHG OR STATISTICAL LEVEL 1-5: Performed by: SPECIALIST

## 2023-01-01 PROCEDURE — 700105 HCHG RX REV CODE 258: Performed by: STUDENT IN AN ORGANIZED HEALTH CARE EDUCATION/TRAINING PROGRAM

## 2023-01-01 PROCEDURE — 97163 PT EVAL HIGH COMPLEX 45 MIN: CPT

## 2023-01-01 PROCEDURE — 0DBN8ZZ EXCISION OF SIGMOID COLON, VIA NATURAL OR ARTIFICIAL OPENING ENDOSCOPIC: ICD-10-PCS | Performed by: SPECIALIST

## 2023-01-01 RX ORDER — ONDANSETRON 2 MG/ML
4 INJECTION INTRAMUSCULAR; INTRAVENOUS EVERY 4 HOURS PRN
Status: DISCONTINUED | OUTPATIENT
Start: 2023-01-01 | End: 2023-01-01 | Stop reason: HOSPADM

## 2023-01-01 RX ORDER — MORPHINE SULFATE 100 MG/5ML
10-20 SOLUTION ORAL
Qty: 240 ML | Refills: 0 | Status: SHIPPED | OUTPATIENT
Start: 2023-01-01 | End: 2023-01-01

## 2023-01-01 RX ORDER — DIPHENHYDRAMINE HYDROCHLORIDE 50 MG/ML
25 INJECTION INTRAMUSCULAR; INTRAVENOUS ONCE
Status: COMPLETED | OUTPATIENT
Start: 2023-01-01 | End: 2023-01-01

## 2023-01-01 RX ORDER — IBUPROFEN 200 MG
400-600 TABLET ORAL 3 TIMES DAILY PRN
Status: ON HOLD | COMMUNITY
End: 2023-01-01

## 2023-01-01 RX ORDER — PANTOPRAZOLE SODIUM 40 MG/10ML
40 INJECTION, POWDER, LYOPHILIZED, FOR SOLUTION INTRAVENOUS 2 TIMES DAILY
Status: DISCONTINUED | OUTPATIENT
Start: 2023-01-01 | End: 2023-01-01 | Stop reason: HOSPADM

## 2023-01-01 RX ORDER — OXYCODONE HYDROCHLORIDE 100 MG/5ML
10-20 SOLUTION ORAL
Qty: 240 ML | Refills: 0 | Status: SHIPPED | OUTPATIENT
Start: 2023-01-01 | End: 2024-11-19

## 2023-01-01 RX ORDER — HYDROMORPHONE HYDROCHLORIDE 1 MG/ML
0.5 INJECTION, SOLUTION INTRAMUSCULAR; INTRAVENOUS; SUBCUTANEOUS ONCE
Status: COMPLETED | OUTPATIENT
Start: 2023-01-01 | End: 2023-01-01

## 2023-01-01 RX ORDER — OXYCODONE HYDROCHLORIDE 10 MG/1
10 TABLET ORAL EVERY 6 HOURS PRN
Qty: 120 TABLET | Refills: 0 | Status: SHIPPED | OUTPATIENT
Start: 2023-01-01 | End: 2023-01-01

## 2023-01-01 RX ORDER — LORAZEPAM 2 MG/ML
1-2 CONCENTRATE ORAL
Qty: 360 ML | Refills: 0 | Status: SHIPPED | OUTPATIENT
Start: 2023-01-01 | End: 2024-11-16

## 2023-01-01 RX ORDER — HYDROCORTISONE 100 MG/60ML
1 SUSPENSION RECTAL
Status: DISCONTINUED | OUTPATIENT
Start: 2023-01-01 | End: 2023-01-01 | Stop reason: HOSPADM

## 2023-01-01 RX ORDER — HYDROCORTISONE 100 MG/60ML
100 SUSPENSION RECTAL
Qty: 1680 ML | Refills: 0 | Status: SHIPPED | OUTPATIENT
Start: 2023-01-01

## 2023-01-01 RX ORDER — ENOXAPARIN SODIUM 100 MG/ML
40 INJECTION SUBCUTANEOUS DAILY
Status: DISCONTINUED | OUTPATIENT
Start: 2023-01-01 | End: 2023-01-01

## 2023-01-01 RX ORDER — PANTOPRAZOLE SODIUM 40 MG/1
40 TABLET, DELAYED RELEASE ORAL DAILY
Qty: 30 TABLET | Refills: 0 | Status: SHIPPED | OUTPATIENT
Start: 2023-01-01 | End: 2023-01-01 | Stop reason: SDUPTHER

## 2023-01-01 RX ORDER — MORPHINE SULFATE 4 MG/ML
4 INJECTION INTRAVENOUS ONCE
Status: COMPLETED | OUTPATIENT
Start: 2023-01-01 | End: 2023-01-01

## 2023-01-01 RX ORDER — LORAZEPAM 2 MG/ML
0.5 INJECTION INTRAMUSCULAR EVERY 4 HOURS PRN
Status: DISCONTINUED | OUTPATIENT
Start: 2023-01-01 | End: 2023-01-01

## 2023-01-01 RX ORDER — HALOPERIDOL 5 MG/ML
1 INJECTION INTRAMUSCULAR
Status: DISCONTINUED | OUTPATIENT
Start: 2023-01-01 | End: 2023-01-01 | Stop reason: HOSPADM

## 2023-01-01 RX ORDER — HYDROMORPHONE HYDROCHLORIDE 1 MG/ML
1 INJECTION, SOLUTION INTRAMUSCULAR; INTRAVENOUS; SUBCUTANEOUS
Status: DISCONTINUED | OUTPATIENT
Start: 2023-01-01 | End: 2023-01-01 | Stop reason: HOSPADM

## 2023-01-01 RX ORDER — LEVETIRACETAM 500 MG/1
500 TABLET ORAL 2 TIMES DAILY
Status: DISCONTINUED | OUTPATIENT
Start: 2023-01-01 | End: 2023-01-01

## 2023-01-01 RX ORDER — ONDANSETRON 4 MG/1
4 TABLET, ORALLY DISINTEGRATING ORAL EVERY 6 HOURS PRN
Qty: 15 TABLET | Refills: 10 | Status: SHIPPED | OUTPATIENT
Start: 2023-01-01 | End: 2024-11-16

## 2023-01-01 RX ORDER — PANTOPRAZOLE SODIUM 40 MG/10ML
40 INJECTION, POWDER, LYOPHILIZED, FOR SOLUTION INTRAVENOUS 2 TIMES DAILY
Status: COMPLETED | OUTPATIENT
Start: 2023-01-01 | End: 2023-01-01

## 2023-01-01 RX ORDER — ONDANSETRON 2 MG/ML
4 INJECTION INTRAMUSCULAR; INTRAVENOUS
Status: DISCONTINUED | OUTPATIENT
Start: 2023-01-01 | End: 2023-01-01 | Stop reason: HOSPADM

## 2023-01-01 RX ORDER — HYDROMORPHONE HYDROCHLORIDE 1 MG/ML
1 INJECTION, SOLUTION INTRAMUSCULAR; INTRAVENOUS; SUBCUTANEOUS ONCE
Status: COMPLETED | OUTPATIENT
Start: 2023-01-01 | End: 2023-01-01

## 2023-01-01 RX ORDER — EPHEDRINE SULFATE 50 MG/ML
5 INJECTION, SOLUTION INTRAVENOUS
Status: DISCONTINUED | OUTPATIENT
Start: 2023-01-01 | End: 2023-01-01 | Stop reason: HOSPADM

## 2023-01-01 RX ORDER — OXYCODONE HCL 5 MG/5 ML
5 SOLUTION, ORAL ORAL
Status: DISCONTINUED | OUTPATIENT
Start: 2023-01-01 | End: 2023-01-01 | Stop reason: HOSPADM

## 2023-01-01 RX ORDER — OMEPRAZOLE 40 MG/1
40 CAPSULE, DELAYED RELEASE ORAL 2 TIMES DAILY
Qty: 60 CAPSULE | Refills: 1 | Status: SHIPPED | OUTPATIENT
Start: 2023-01-01 | End: 2023-01-01

## 2023-01-01 RX ORDER — OXYCODONE HYDROCHLORIDE 10 MG/1
10 TABLET ORAL EVERY 6 HOURS PRN
Qty: 20 TABLET | Refills: 0 | Status: SHIPPED | OUTPATIENT
Start: 2023-01-01 | End: 2023-01-01

## 2023-01-01 RX ORDER — LORAZEPAM 2 MG/ML
1-2 INJECTION INTRAMUSCULAR ONCE
Status: COMPLETED | OUTPATIENT
Start: 2023-01-01 | End: 2023-01-01

## 2023-01-01 RX ORDER — OXYCODONE HYDROCHLORIDE 10 MG/1
10 TABLET ORAL EVERY 4 HOURS PRN
Qty: 18 TABLET | Refills: 0 | Status: SHIPPED | OUTPATIENT
Start: 2023-01-01 | End: 2023-01-01

## 2023-01-01 RX ORDER — PANTOPRAZOLE SODIUM 40 MG/10ML
40 INJECTION, POWDER, LYOPHILIZED, FOR SOLUTION INTRAVENOUS 2 TIMES DAILY
Status: DISCONTINUED | OUTPATIENT
Start: 2023-01-01 | End: 2023-01-01

## 2023-01-01 RX ORDER — SODIUM CHLORIDE, SODIUM LACTATE, POTASSIUM CHLORIDE, CALCIUM CHLORIDE 600; 310; 30; 20 MG/100ML; MG/100ML; MG/100ML; MG/100ML
INJECTION, SOLUTION INTRAVENOUS CONTINUOUS
Status: DISCONTINUED | OUTPATIENT
Start: 2023-01-01 | End: 2023-01-01

## 2023-01-01 RX ORDER — OXYCODONE HCL 5 MG/5 ML
10 SOLUTION, ORAL ORAL
Status: DISCONTINUED | OUTPATIENT
Start: 2023-01-01 | End: 2023-01-01 | Stop reason: HOSPADM

## 2023-01-01 RX ORDER — HYDROMORPHONE HYDROCHLORIDE 1 MG/ML
0.25 INJECTION, SOLUTION INTRAMUSCULAR; INTRAVENOUS; SUBCUTANEOUS
Status: DISCONTINUED | OUTPATIENT
Start: 2023-01-01 | End: 2023-01-01

## 2023-01-01 RX ORDER — ACETAMINOPHEN 500 MG
500 TABLET ORAL EVERY 6 HOURS PRN
Status: DISCONTINUED | OUTPATIENT
Start: 2023-01-01 | End: 2023-01-01 | Stop reason: HOSPADM

## 2023-01-01 RX ORDER — BISACODYL 10 MG
10 SUPPOSITORY, RECTAL RECTAL PRN
Qty: 5 SUPPOSITORY | Refills: 10 | Status: SHIPPED | OUTPATIENT
Start: 2023-01-01 | End: 2024-11-16

## 2023-01-01 RX ORDER — ACETAMINOPHEN 650 MG/1
650 SUPPOSITORY RECTAL EVERY 6 HOURS PRN
Qty: 5 SUPPOSITORY | Refills: 10 | Status: SHIPPED | OUTPATIENT
Start: 2023-01-01

## 2023-01-01 RX ORDER — SODIUM CHLORIDE 9 MG/ML
INJECTION, SOLUTION INTRAVENOUS CONTINUOUS
Status: ACTIVE | OUTPATIENT
Start: 2023-01-01 | End: 2023-01-01

## 2023-01-01 RX ORDER — LABETALOL HYDROCHLORIDE 5 MG/ML
5 INJECTION, SOLUTION INTRAVENOUS
Status: DISCONTINUED | OUTPATIENT
Start: 2023-01-01 | End: 2023-01-01 | Stop reason: HOSPADM

## 2023-01-01 RX ORDER — OXYCODONE HYDROCHLORIDE 5 MG/1
10 TABLET ORAL ONCE
Status: COMPLETED | OUTPATIENT
Start: 2023-01-01 | End: 2023-01-01

## 2023-01-01 RX ORDER — DIPHENHYDRAMINE HYDROCHLORIDE 50 MG/ML
12.5 INJECTION INTRAMUSCULAR; INTRAVENOUS
Status: DISCONTINUED | OUTPATIENT
Start: 2023-01-01 | End: 2023-01-01 | Stop reason: HOSPADM

## 2023-01-01 RX ORDER — POTASSIUM CHLORIDE 20 MEQ/1
20 TABLET, EXTENDED RELEASE ORAL DAILY
Status: DISCONTINUED | OUTPATIENT
Start: 2023-01-01 | End: 2023-01-01

## 2023-01-01 RX ORDER — ONDANSETRON 4 MG/1
4 TABLET, ORALLY DISINTEGRATING ORAL EVERY 4 HOURS PRN
Status: DISCONTINUED | OUTPATIENT
Start: 2023-01-01 | End: 2023-01-01 | Stop reason: HOSPADM

## 2023-01-01 RX ORDER — OXYCODONE HYDROCHLORIDE 5 MG/1
5 TABLET ORAL
Status: DISCONTINUED | OUTPATIENT
Start: 2023-01-01 | End: 2023-01-01

## 2023-01-01 RX ORDER — LABETALOL HYDROCHLORIDE 5 MG/ML
10 INJECTION, SOLUTION INTRAVENOUS EVERY 4 HOURS PRN
Status: DISCONTINUED | OUTPATIENT
Start: 2023-01-01 | End: 2023-01-01 | Stop reason: HOSPADM

## 2023-01-01 RX ORDER — AMOXICILLIN 250 MG
1 CAPSULE ORAL ONCE
Status: DISPENSED | OUTPATIENT
Start: 2023-01-01 | End: 2023-01-01

## 2023-01-01 RX ORDER — ACETAMINOPHEN 500 MG
1000 TABLET ORAL EVERY 6 HOURS PRN
Qty: 30 TABLET | Refills: 10 | Status: SHIPPED | OUTPATIENT
Start: 2023-01-01 | End: 2024-11-16

## 2023-01-01 RX ORDER — OXYCODONE HYDROCHLORIDE 10 MG/1
10 TABLET ORAL EVERY 6 HOURS PRN
Qty: 20 TABLET | Refills: 0 | Status: SHIPPED | OUTPATIENT
Start: 2023-01-01 | End: 2023-01-01 | Stop reason: SDUPTHER

## 2023-01-01 RX ORDER — METOCLOPRAMIDE 10 MG/1
10 TABLET ORAL EVERY 6 HOURS PRN
Qty: 60 TABLET | Refills: 3 | Status: SHIPPED | OUTPATIENT
Start: 2023-01-01 | End: 2024-11-19

## 2023-01-01 RX ORDER — SODIUM CHLORIDE, SODIUM LACTATE, POTASSIUM CHLORIDE, CALCIUM CHLORIDE 600; 310; 30; 20 MG/100ML; MG/100ML; MG/100ML; MG/100ML
INJECTION, SOLUTION INTRAVENOUS CONTINUOUS
Status: DISCONTINUED | OUTPATIENT
Start: 2023-01-01 | End: 2023-01-01 | Stop reason: HOSPADM

## 2023-01-01 RX ORDER — SODIUM CHLORIDE 9 MG/ML
1000 INJECTION, SOLUTION INTRAVENOUS ONCE
Status: COMPLETED | OUTPATIENT
Start: 2023-01-01 | End: 2023-01-01

## 2023-01-01 RX ORDER — OXYCODONE HYDROCHLORIDE 10 MG/1
10 TABLET ORAL
Status: DISCONTINUED | OUTPATIENT
Start: 2023-01-01 | End: 2023-01-01 | Stop reason: HOSPADM

## 2023-01-01 RX ORDER — ACETAMINOPHEN 325 MG/1
650 TABLET ORAL EVERY 6 HOURS PRN
Status: DISCONTINUED | OUTPATIENT
Start: 2023-01-01 | End: 2023-01-01

## 2023-01-01 RX ORDER — OXYCODONE HYDROCHLORIDE 5 MG/1
2.5 TABLET ORAL
Status: DISCONTINUED | OUTPATIENT
Start: 2023-01-01 | End: 2023-01-01

## 2023-01-01 RX ORDER — MORPHINE SULFATE 30 MG/1
30 TABLET, FILM COATED, EXTENDED RELEASE ORAL EVERY 12 HOURS
Qty: 120 TABLET | Refills: 0 | Status: SHIPPED | OUTPATIENT
Start: 2023-01-01 | End: 2023-01-01

## 2023-01-01 RX ORDER — ONDANSETRON 4 MG/1
4 TABLET, ORALLY DISINTEGRATING ORAL EVERY 6 HOURS PRN
Qty: 30 TABLET | Refills: 0 | Status: SHIPPED | OUTPATIENT
Start: 2023-01-01 | End: 2023-01-01

## 2023-01-01 RX ORDER — PANTOPRAZOLE SODIUM 40 MG/1
40 TABLET, DELAYED RELEASE ORAL DAILY
Qty: 30 TABLET | Refills: 3 | Status: SHIPPED | OUTPATIENT
Start: 2023-01-01

## 2023-01-01 RX ORDER — SENNA AND DOCUSATE SODIUM 50; 8.6 MG/1; MG/1
2 TABLET, FILM COATED ORAL 2 TIMES DAILY PRN
Qty: 28 TABLET | Refills: 10 | Status: SHIPPED | OUTPATIENT
Start: 2023-01-01 | End: 2024-11-16

## 2023-01-01 RX ORDER — OXYCODONE HYDROCHLORIDE 5 MG/1
5 TABLET ORAL
Status: DISCONTINUED | OUTPATIENT
Start: 2023-01-01 | End: 2023-01-01 | Stop reason: HOSPADM

## 2023-01-01 RX ORDER — HYDROMORPHONE HYDROCHLORIDE 1 MG/ML
0.5 INJECTION, SOLUTION INTRAMUSCULAR; INTRAVENOUS; SUBCUTANEOUS
Status: DISCONTINUED | OUTPATIENT
Start: 2023-01-01 | End: 2023-01-01

## 2023-01-01 RX ORDER — MAGNESIUM SULFATE HEPTAHYDRATE 40 MG/ML
2 INJECTION, SOLUTION INTRAVENOUS ONCE
Status: COMPLETED | OUTPATIENT
Start: 2023-01-01 | End: 2023-01-01

## 2023-01-01 RX ORDER — LORAZEPAM 2 MG/ML
1 INJECTION INTRAMUSCULAR
Status: DISCONTINUED | OUTPATIENT
Start: 2023-01-01 | End: 2023-01-01 | Stop reason: HOSPADM

## 2023-01-01 RX ORDER — MIDAZOLAM HYDROCHLORIDE 1 MG/ML
INJECTION INTRAMUSCULAR; INTRAVENOUS PRN
Status: DISCONTINUED | OUTPATIENT
Start: 2023-01-01 | End: 2023-01-01 | Stop reason: SURG

## 2023-01-01 RX ADMIN — OXYCODONE HYDROCHLORIDE 5 MG: 5 TABLET ORAL at 05:03

## 2023-01-01 RX ADMIN — PANTOPRAZOLE SODIUM 40 MG: 40 INJECTION, POWDER, FOR SOLUTION INTRAVENOUS at 04:56

## 2023-01-01 RX ADMIN — HYDROMORPHONE HYDROCHLORIDE 0.5 MG: 1 INJECTION, SOLUTION INTRAMUSCULAR; INTRAVENOUS; SUBCUTANEOUS at 22:23

## 2023-01-01 RX ADMIN — PANTOPRAZOLE SODIUM 40 MG: 40 INJECTION, POWDER, FOR SOLUTION INTRAVENOUS at 16:40

## 2023-01-01 RX ADMIN — PROPOFOL 20 MG: 10 INJECTION, EMULSION INTRAVENOUS at 08:18

## 2023-01-01 RX ADMIN — OXYCODONE 5 MG: 5 TABLET ORAL at 15:32

## 2023-01-01 RX ADMIN — PANTOPRAZOLE SODIUM 40 MG: 40 INJECTION, POWDER, FOR SOLUTION INTRAVENOUS at 17:53

## 2023-01-01 RX ADMIN — MORPHINE SULFATE 4 MG: 4 INJECTION, SOLUTION INTRAMUSCULAR; INTRAVENOUS at 11:45

## 2023-01-01 RX ADMIN — POLYETHYLENE GLYCOL-3350 AND ELECTROLYTES 4 L: 236; 6.74; 5.86; 2.97; 22.74 POWDER, FOR SOLUTION ORAL at 18:07

## 2023-01-01 RX ADMIN — OXYCODONE HYDROCHLORIDE 5 MG: 5 TABLET ORAL at 11:31

## 2023-01-01 RX ADMIN — OXYCODONE 5 MG: 5 TABLET ORAL at 16:38

## 2023-01-01 RX ADMIN — OXYCODONE 5 MG: 5 TABLET ORAL at 11:45

## 2023-01-01 RX ADMIN — POTASSIUM CHLORIDE 20 MEQ: 1500 TABLET, EXTENDED RELEASE ORAL at 04:44

## 2023-01-01 RX ADMIN — OXYCODONE HYDROCHLORIDE 5 MG: 5 TABLET ORAL at 16:19

## 2023-01-01 RX ADMIN — OXYCODONE 5 MG: 5 TABLET ORAL at 00:09

## 2023-01-01 RX ADMIN — OXYCODONE 10 MG: 5 TABLET ORAL at 16:54

## 2023-01-01 RX ADMIN — HYDROMORPHONE HYDROCHLORIDE 1 MG: 1 INJECTION, SOLUTION INTRAMUSCULAR; INTRAVENOUS; SUBCUTANEOUS at 14:25

## 2023-01-01 RX ADMIN — FENTANYL CITRATE 50 MCG: 50 INJECTION, SOLUTION INTRAMUSCULAR; INTRAVENOUS at 10:38

## 2023-01-01 RX ADMIN — LABETALOL HYDROCHLORIDE 10 MG: 5 INJECTION INTRAVENOUS at 13:17

## 2023-01-01 RX ADMIN — POLYETHYLENE GLYCOL-3350 AND ELECTROLYTES 4 L: 236; 6.74; 5.86; 2.97; 22.74 POWDER, FOR SOLUTION ORAL at 22:00

## 2023-01-01 RX ADMIN — IOHEXOL 100 ML: 350 INJECTION, SOLUTION INTRAVENOUS at 10:56

## 2023-01-01 RX ADMIN — MIDAZOLAM 2 MG: 1 INJECTION, SOLUTION INTRAMUSCULAR; INTRAVENOUS at 08:17

## 2023-01-01 RX ADMIN — LORAZEPAM 1 MG: 2 INJECTION INTRAMUSCULAR; INTRAVENOUS at 07:58

## 2023-01-01 RX ADMIN — OXYCODONE 5 MG: 5 TABLET ORAL at 21:42

## 2023-01-01 RX ADMIN — PROPOFOL 40 MG: 10 INJECTION, EMULSION INTRAVENOUS at 08:22

## 2023-01-01 RX ADMIN — SODIUM CHLORIDE, POTASSIUM CHLORIDE, SODIUM LACTATE AND CALCIUM CHLORIDE: 600; 310; 30; 20 INJECTION, SOLUTION INTRAVENOUS at 08:16

## 2023-01-01 RX ADMIN — HYDROMORPHONE HYDROCHLORIDE 0.25 MG: 1 INJECTION, SOLUTION INTRAMUSCULAR; INTRAVENOUS; SUBCUTANEOUS at 05:04

## 2023-01-01 RX ADMIN — ENOXAPARIN SODIUM 40 MG: 100 INJECTION SUBCUTANEOUS at 17:45

## 2023-01-01 RX ADMIN — DIPHENHYDRAMINE HYDROCHLORIDE 25 MG: 50 INJECTION, SOLUTION INTRAMUSCULAR; INTRAVENOUS at 10:38

## 2023-01-01 RX ADMIN — OXYCODONE 5 MG: 5 TABLET ORAL at 08:16

## 2023-01-01 RX ADMIN — SODIUM CHLORIDE 1000 ML: 9 INJECTION, SOLUTION INTRAVENOUS at 11:30

## 2023-01-01 RX ADMIN — PROPOFOL 20 MG: 10 INJECTION, EMULSION INTRAVENOUS at 08:20

## 2023-01-01 RX ADMIN — POTASSIUM CHLORIDE 20 MEQ: 1500 TABLET, EXTENDED RELEASE ORAL at 16:44

## 2023-01-01 RX ADMIN — PANTOPRAZOLE SODIUM 40 MG: 40 INJECTION, POWDER, FOR SOLUTION INTRAVENOUS at 04:45

## 2023-01-01 RX ADMIN — PROPOFOL 100 MCG/KG/MIN: 10 INJECTION, EMULSION INTRAVENOUS at 08:19

## 2023-01-01 RX ADMIN — HYDROMORPHONE HYDROCHLORIDE 1 MG: 1 INJECTION, SOLUTION INTRAMUSCULAR; INTRAVENOUS; SUBCUTANEOUS at 16:54

## 2023-01-01 RX ADMIN — OXYCODONE HYDROCHLORIDE 10 MG: 10 TABLET ORAL at 16:40

## 2023-01-01 RX ADMIN — PANTOPRAZOLE SODIUM 40 MG: 40 INJECTION, POWDER, FOR SOLUTION INTRAVENOUS at 04:52

## 2023-01-01 RX ADMIN — LEVETIRACETAM 500 MG: 500 TABLET, FILM COATED ORAL at 13:48

## 2023-01-01 RX ADMIN — SODIUM CHLORIDE, POTASSIUM CHLORIDE, SODIUM LACTATE AND CALCIUM CHLORIDE: 600; 310; 30; 20 INJECTION, SOLUTION INTRAVENOUS at 18:01

## 2023-01-01 RX ADMIN — PANTOPRAZOLE SODIUM 40 MG: 40 INJECTION, POWDER, FOR SOLUTION INTRAVENOUS at 04:13

## 2023-01-01 RX ADMIN — OXYCODONE HYDROCHLORIDE 5 MG: 5 TABLET ORAL at 14:29

## 2023-01-01 RX ADMIN — OXYCODONE HYDROCHLORIDE 10 MG: 10 TABLET ORAL at 10:20

## 2023-01-01 RX ADMIN — HYDROMORPHONE HYDROCHLORIDE 1 MG: 1 INJECTION, SOLUTION INTRAMUSCULAR; INTRAVENOUS; SUBCUTANEOUS at 21:43

## 2023-01-01 RX ADMIN — HYDROCORTISONE 1 ENEMA: 100 SUSPENSION RECTAL at 00:15

## 2023-01-01 RX ADMIN — PANTOPRAZOLE SODIUM 40 MG: 40 INJECTION, POWDER, FOR SOLUTION INTRAVENOUS at 18:15

## 2023-01-01 RX ADMIN — PANTOPRAZOLE SODIUM 40 MG: 40 INJECTION, POWDER, FOR SOLUTION INTRAVENOUS at 17:46

## 2023-01-01 RX ADMIN — OXYCODONE HYDROCHLORIDE 2.5 MG: 5 TABLET ORAL at 13:08

## 2023-01-01 RX ADMIN — SODIUM CHLORIDE: 9 INJECTION, SOLUTION INTRAVENOUS at 13:48

## 2023-01-01 RX ADMIN — MAGNESIUM SULFATE HEPTAHYDRATE 2 G: 2 INJECTION, SOLUTION INTRAVENOUS at 07:56

## 2023-01-01 RX ADMIN — HYDROMORPHONE HYDROCHLORIDE 0.5 MG: 1 INJECTION, SOLUTION INTRAMUSCULAR; INTRAVENOUS; SUBCUTANEOUS at 06:21

## 2023-01-01 RX ADMIN — GADOTERIDOL 15 ML: 279.3 INJECTION, SOLUTION INTRAVENOUS at 12:19

## 2023-01-01 RX ADMIN — PANTOPRAZOLE SODIUM 40 MG: 40 INJECTION, POWDER, FOR SOLUTION INTRAVENOUS at 04:17

## 2023-01-01 RX ADMIN — OXYCODONE HYDROCHLORIDE 5 MG: 5 TABLET ORAL at 20:02

## 2023-01-01 RX ADMIN — OXYCODONE 5 MG: 5 TABLET ORAL at 04:43

## 2023-01-01 RX ADMIN — OXYCODONE HYDROCHLORIDE 5 MG: 5 TABLET ORAL at 21:06

## 2023-01-01 RX ADMIN — HYDROMORPHONE HYDROCHLORIDE 0.25 MG: 1 INJECTION, SOLUTION INTRAMUSCULAR; INTRAVENOUS; SUBCUTANEOUS at 21:07

## 2023-01-01 RX ADMIN — OXYCODONE 5 MG: 5 TABLET ORAL at 04:17

## 2023-01-01 RX ADMIN — OXYCODONE HYDROCHLORIDE 10 MG: 10 TABLET ORAL at 13:08

## 2023-01-01 RX ADMIN — PANTOPRAZOLE SODIUM 40 MG: 40 INJECTION, POWDER, FOR SOLUTION INTRAVENOUS at 16:44

## 2023-01-01 RX ADMIN — OXYCODONE 5 MG: 5 TABLET ORAL at 18:59

## 2023-01-01 RX ADMIN — OXYCODONE HYDROCHLORIDE 5 MG: 5 TABLET ORAL at 10:02

## 2023-01-01 ASSESSMENT — ENCOUNTER SYMPTOMS
DIZZINESS: 0
PALPITATIONS: 0
TINGLING: 0
PALPITATIONS: 0
NAUSEA: 1
WHEEZING: 0
VOMITING: 0
COUGH: 0
SPEECH CHANGE: 0
WEAKNESS: 1
HIGHEST PAIN SEVERITY IN PAST 24 HOURS: 8/10
SORE THROAT: 0
PAIN: 1
NERVOUS/ANXIOUS: 1
DIZZINESS: 0
BACK PAIN: 1
HEADACHES: 1
SEIZURES: 1
CHILLS: 0
DOUBLE VISION: 0
DIAPHORESIS: 0
SEIZURES: 1
BLOOD IN STOOL: 0
SHORTNESS OF BREATH: 0
BLURRED VISION: 0
CONSTIPATION: 0
NAUSEA: 1
ABDOMINAL PAIN: 1
PAIN LOCATION - PAIN FREQUENCY: CONSTANT
HEADACHES: 0
DIAPHORESIS: 0
FLANK PAIN: 0
DIAPHORESIS: 0
BRUISES/BLEEDS EASILY: 0
PAIN LOCATION - PAIN FREQUENCY: CONSTANT
BOWEL PATTERN NORMAL: 1
ABDOMINAL PAIN: 1
SORE THROAT: 0
PERSON REPORTING PAIN: PATIENT
LAST BOWEL MOVEMENT: 66795
NECK PAIN: 0
FOCAL WEAKNESS: 0
SENSORY CHANGE: 0
SPUTUM PRODUCTION: 0
NAUSEA: 0
PALPITATIONS: 0
DIZZINESS: 0
NECK PAIN: 0
SORE THROAT: 0
NECK PAIN: 0
CHILLS: 0
MYALGIAS: 0
LOWEST PAIN SEVERITY IN PAST 24 HOURS: 5/10
WEAKNESS: 0
HEADACHES: 1
MYALGIAS: 0
DIZZINESS: 0
SENSORY CHANGE: 0
DOUBLE VISION: 0
ABDOMINAL PAIN: 1
FEVER: 0
NAUSEA: 1
DIAPHORESIS: 0
SHORTNESS OF BREATH: 0
WEIGHT LOSS: 0
FEVER: 0
WHEEZING: 0
SPUTUM PRODUCTION: 0
HEARTBURN: 0
BACK PAIN: 0
CONSTIPATION: 0
BLOOD IN STOOL: 0
HEARTBURN: 0
DIARRHEA: 0
HIGHEST PAIN SEVERITY IN PAST 24 HOURS: 7/10
CHILLS: 0
DIARRHEA: 0
BACK PAIN: 0
FLANK PAIN: 0
NECK PAIN: 0
DIAPHORESIS: 0
FEVER: 0
CONSTIPATION: 0
SHORTNESS OF BREATH: 0
SPEECH CHANGE: 0
NERVOUS/ANXIOUS: 1
WEAKNESS: 1
NERVOUS/ANXIOUS: 0
WEIGHT LOSS: 0
DEPRESSION: 0
WEAKNESS: 1
ABDOMINAL PAIN: 1
BLOOD IN STOOL: 0
CHILLS: 0
BLURRED VISION: 0
FLANK PAIN: 0
PALPITATIONS: 0
PAIN LOCATION - PAIN QUALITY: ACHY
WEAKNESS: 0
DIARRHEA: 0
VOMITING: 0
NERVOUS/ANXIOUS: 0
FATIGUE: 1
INSOMNIA: 0
HEADACHES: 0
ABDOMINAL PAIN: 0
COUGH: 0
PALPITATIONS: 0
ABDOMINAL PAIN: 0
HEADACHES: 1
NAUSEA: 1
SENSORY CHANGE: 0
LOWEST PAIN SEVERITY IN PAST 24 HOURS: 7/10
SHORTNESS OF BREATH: 0
PAIN LOCATION: ABDOMEN
HEARTBURN: 0
NERVOUS/ANXIOUS: 1
BACK PAIN: 0
WEAKNESS: 0
ABDOMINAL PAIN: 1
VOMITING: 0
NERVOUS/ANXIOUS: 1
FOCAL WEAKNESS: 0
BACK PAIN: 0
FOCAL WEAKNESS: 0
BACK PAIN: 1
MYALGIAS: 0
MYALGIAS: 0
SORE THROAT: 0
HEADACHES: 1
FEVER: 0
SPEECH CHANGE: 0
ORTHOPNEA: 0
HEARTBURN: 0
HEARTBURN: 0
FATIGUES EASILY: 1
VOMITING: 0
WHEEZING: 0
COUGH: 0
TINGLING: 0
WHEEZING: 0
SINUS PAIN: 0
SHORTNESS OF BREATH: 0
SUBJECTIVE PAIN PROGRESSION: WAXING AND WANING
MUSCLE WEAKNESS: 1
BRUISES/BLEEDS EASILY: 0
NAUSEA: 0
SENSORY CHANGE: 0
FLANK PAIN: 0
SEIZURES: 0
FOCAL WEAKNESS: 0
PAIN LOCATION: ABDOMEN
SPEECH CHANGE: 0
PAIN LOCATION - PAIN QUALITY: ACHY
CHILLS: 0
SINUS PAIN: 0
BLURRED VISION: 0
COUGH: 0
BLURRED VISION: 0
MYALGIAS: 0
INSOMNIA: 0
PAIN LOCATION - PAIN SEVERITY: 5/10
FEVER: 0
WHEEZING: 0
WEAKNESS: 1
PERSON REPORTING PAIN: FAMILY
COUGH: 0
DIARRHEA: 0
VOMITING: 0
ORTHOPNEA: 0
BLURRED VISION: 0
ABDOMINAL PAIN: 0
PAIN LOCATION - PAIN SEVERITY: 7/10
SORE THROAT: 0
NAUSEA: 0
DIARRHEA: 0
DIZZINESS: 0
PAIN: 1

## 2023-01-01 ASSESSMENT — PAIN DESCRIPTION - PAIN TYPE
TYPE: ACUTE PAIN
TYPE: ACUTE PAIN;CHRONIC PAIN
TYPE: ACUTE PAIN

## 2023-01-01 ASSESSMENT — COGNITIVE AND FUNCTIONAL STATUS - GENERAL
WALKING IN HOSPITAL ROOM: A LITTLE
TURNING FROM BACK TO SIDE WHILE IN FLAT BAD: A LITTLE
MOVING TO AND FROM BED TO CHAIR: A LITTLE
CLIMB 3 TO 5 STEPS WITH RAILING: A LITTLE
CLIMB 3 TO 5 STEPS WITH RAILING: A LITTLE
MOBILITY SCORE: 21
MOBILITY SCORE: 20
DAILY ACTIVITIY SCORE: 24
STANDING UP FROM CHAIR USING ARMS: A LITTLE
SUGGESTED CMS G CODE MODIFIER MOBILITY: CJ
DAILY ACTIVITIY SCORE: 24
MOBILITY SCORE: 19
DAILY ACTIVITIY SCORE: 24
TURNING FROM BACK TO SIDE WHILE IN FLAT BAD: A LITTLE
STANDING UP FROM CHAIR USING ARMS: A LITTLE
SUGGESTED CMS G CODE MODIFIER MOBILITY: CJ
SUGGESTED CMS G CODE MODIFIER MOBILITY: CK
SUGGESTED CMS G CODE MODIFIER DAILY ACTIVITY: CH
MOVING TO AND FROM BED TO CHAIR: A LITTLE
WALKING IN HOSPITAL ROOM: A LITTLE
SUGGESTED CMS G CODE MODIFIER DAILY ACTIVITY: CH
CLIMB 3 TO 5 STEPS WITH RAILING: A LITTLE
MOVING TO AND FROM BED TO CHAIR: A LITTLE
SUGGESTED CMS G CODE MODIFIER DAILY ACTIVITY: CH

## 2023-01-01 ASSESSMENT — LIFESTYLE VARIABLES
CONSUMPTION TOTAL: NEGATIVE
TOTAL SCORE: 0
TOTAL SCORE: 0
EVER HAD A DRINK FIRST THING IN THE MORNING TO STEADY YOUR NERVES TO GET RID OF A HANGOVER: NO
AVERAGE NUMBER OF DAYS PER WEEK YOU HAVE A DRINK CONTAINING ALCOHOL: 0
HOW MANY TIMES IN THE PAST YEAR HAVE YOU HAD 5 OR MORE DRINKS IN A DAY: 0
EVER FELT BAD OR GUILTY ABOUT YOUR DRINKING: NO
TOTAL SCORE: 0
ALCOHOL_USE: NO
ON A TYPICAL DAY WHEN YOU DRINK ALCOHOL HOW MANY DRINKS DO YOU HAVE: 0
HAVE PEOPLE ANNOYED YOU BY CRITICIZING YOUR DRINKING: NO
HAVE YOU EVER FELT YOU SHOULD CUT DOWN ON YOUR DRINKING: NO
DOES PATIENT WANT TO STOP DRINKING: NO

## 2023-01-01 ASSESSMENT — FIBROSIS 4 INDEX
FIB4 SCORE: 2.08
FIB4 SCORE: 2.08
FIB4 SCORE: 1.43
FIB4 SCORE: 2.08
FIB4 SCORE: 3.17

## 2023-01-01 ASSESSMENT — ACTIVITIES OF DAILY LIVING (ADL)
CURRENT_FUNCTION: STAND BY ASSIST
MONEY MANAGEMENT (EXPENSES/BILLS): NEEDS ASSISTANCE
TOILETING: INDEPENDENT
AMBULATION ASSISTANCE: STAND BY ASSIST

## 2023-01-01 ASSESSMENT — PATIENT HEALTH QUESTIONNAIRE - PHQ9
SUM OF ALL RESPONSES TO PHQ9 QUESTIONS 1 AND 2: 0
CLINICAL INTERPRETATION OF PHQ2 SCORE: 0
2. FEELING DOWN, DEPRESSED, IRRITABLE, OR HOPELESS: NOT AT ALL
1. LITTLE INTEREST OR PLEASURE IN DOING THINGS: NOT AT ALL

## 2023-01-01 ASSESSMENT — GAIT ASSESSMENTS
DISTANCE (FEET): 200
GAIT LEVEL OF ASSIST: STANDBY ASSIST
ASSISTIVE DEVICE: OTHER (COMMENTS)

## 2023-01-01 ASSESSMENT — PAIN SCALES - GENERAL
PAIN_LEVEL: 1
PAINLEVEL: 4=SLIGHT-MODERATE PAIN

## 2023-10-24 PROBLEM — Z71.89 GOALS OF CARE, COUNSELING/DISCUSSION: Status: ACTIVE | Noted: 2023-01-01

## 2023-10-24 PROBLEM — E87.20 LACTIC ACID ACIDOSIS: Status: ACTIVE | Noted: 2023-01-01

## 2023-10-24 PROBLEM — R56.9 SEIZURE (HCC): Status: ACTIVE | Noted: 2023-01-01

## 2023-10-24 PROBLEM — K92.2 UPPER GI BLEED: Status: ACTIVE | Noted: 2023-01-01

## 2023-10-24 PROBLEM — C79.51 METASTATIC CANCER TO SPINE (HCC): Status: ACTIVE | Noted: 2023-01-01

## 2023-10-24 PROBLEM — C78.7 METASTASIS TO LIVER (HCC): Status: ACTIVE | Noted: 2023-01-01

## 2023-10-24 PROBLEM — C43.9 MELANOMA (HCC): Status: ACTIVE | Noted: 2023-01-01

## 2023-10-24 PROBLEM — R10.9 ABDOMINAL PAIN: Status: ACTIVE | Noted: 2023-01-01

## 2023-10-24 NOTE — ED NOTES
Blood to lab. Straight cath urine collected and sent. Covid swab sent. Pt medicated as ordered. To CT w/ RN on monitor r/t to concerns about an allergic rxn to contrast.    Pt tolerated procedure well. Continues to c/o abd pain.

## 2023-10-24 NOTE — ED PROVIDER NOTES
ED Provider Note    Scribed for Jessa Rene M.D. by Alesia Robertson. 10/24/2023, 9:48 AM.    Primary care provider: Oly Wang P.A.-C.  Means of arrival: Ambulance  History obtained from: EMS, Patient  History limited by: The patient does not remember the event.     CHIEF COMPLAINT  Chief Complaint   Patient presents with    Mental Status Change    Seizure     Possible seizure       HPI/ROS  Malaika Garcia is a 68 y.o. female who presents to the Emergency Department following seizure prior to arrival. She reports that she was shaky and saw small white flashes last night. She currently reports having a headache. Per , the patient has been feeling sick for the past 4 days and has been bedridden since then. When he went to check on her this morning, the patient was disoriented, confused, and seeing colored flashed in her vision. He stated that he then witnessed her have a generalized tonic-clonic seizure in which her whole body was shaking. He then called the ambulance to bring her to the ED. he is uncertain how long the seizure activity lasted.  The patient has a history of a remote seizure 1 time approximately 10 years ago.  Patient does not recall if she was on any seizure medication.  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 The patient was treated at [facility] and reports she is due for a check up on her current status.     She states that a few weeks ago she bent over to grab something and felt a sharp abdominal pain and has been having intermittent abdominal pain since then.  The pain has worsened over the past 4 days with associated nausea.  She locates the pain across her upper abdomen but more to the right side.     EXTERNAL RECORDS REVIEWED  The patient has melanoma and is followed by Dr. Rooney. The patient has a history of smoking, osteoporosis, and a seizure disorder. Her last seizure was in 2013 according to her provider note from June  .     LIMITATION TO HISTORY   Select: Patient does not remember the event.     OUTSIDE HISTORIAN(S):  EMS provided additional history.      PAST MEDICAL HISTORY   has a past medical history of Arthritis, RLS (restless legs syndrome), Seizure (HCC) (2013), and Thyroid disease.    SURGICAL HISTORY   has a past surgical history that includes thyroidectomy () and debridement (Left).    SOCIAL HISTORY  Social History     Tobacco Use    Smoking status: Former     Current packs/day: 0.00     Average packs/day: 1 pack/day for 20.0 years (20.0 ttl pk-yrs)     Types: Cigarettes     Start date: 2000     Quit date: 2020     Years since quittin.9    Smokeless tobacco: Never    Tobacco comments:     quit 1.5 years ago   Vaping Use    Vaping Use: Never used   Substance Use Topics    Alcohol use: Yes     Alcohol/week: 1.8 oz     Types: 3 Standard drinks or equivalent per week     Comment: occasional wine or cocktail    Drug use: Not Currently     Types: Marijuana     Comment: for pain      Social History     Substance and Sexual Activity   Drug Use Not Currently    Types: Marijuana    Comment: for pain       FAMILY HISTORY  Family History   Problem Relation Age of Onset    Stroke Mother     Cancer Mother         Throat cancer    Hypertension Mother     Cancer Maternal Aunt         Breast cancer    Stroke Maternal Grandmother     Hypertension Maternal Grandmother     Cancer Paternal Grandfather         Colon cancer       CURRENT MEDICATIONS  Home Medications       Reviewed by Jose Enrique La (Pharmacy Tech) on 10/24/23 at 1149  Med List Status: Complete     Medication Last Dose Status   Acetaminophen 500 MG Cap 10/23/2023 Active   ibuprofen (MOTRIN) 200 MG Tab 10/23/2023 Active                    ALLERGIES  Allergies   Allergen Reactions    Keflex Unspecified     Cannot remember    Sulfa Drugs Hives       PHYSICAL EXAM  VITAL SIGNS: BP (!) 149/101   Pulse (!) 101   Temp 36.7 °C (98 °F) (Temporal)   " Resp 18   Ht 1.676 m (5' 6\")   Wt 79.4 kg (175 lb)   SpO2 93%   BMI 28.25 kg/m²   Physical Exam  Nursing note and vitals reviewed.  Constitutional: Well-developed and well-nourished. No distress.   HENT: Head is normocephalic. Oropharynx is clear and moist without exudate or erythema.  Tongue bite noted to the tongue  Eyes: Pupils are equal, round, and reactive to light. No horizontal or vertical nystagmus.  Patient is blind in her right eye at baseline  Cardiovascular: Normal rate and regular rhythm. No murmur heard. Normal radial pulses.  Pulmonary/Chest: Breath sounds normal. No wheezes or rales.   Abdominal: Hard, palpable abdominal mass in the epigastric region and right upper quadrant region  Musculoskeletal: Extremities exhibit normal range of motion without edema or tenderness. Patient ambulates with a normal narrow-based steady gait.   Neurological: Awake, alert and oriented to person, place, and time. No focal deficits noted. Cranial nerves II - XII intact. No pronator drift.  No dysmetria on cerebellar testing. Normal speech and language. Normal strength and sensation in bilateral upper and lower extremities.   Skin: Skin is warm and dry. No rash.   Psychiatric: Normal mood and affect. Appropriate for clinical situation.    DIAGNOSTIC STUDIES:  LABS  Labs Reviewed   CBC WITH DIFFERENTIAL - Abnormal; Notable for the following components:       Result Value    Neutrophils-Polys 77.00 (*)     Lymphocytes 15.00 (*)     All other components within normal limits   COMP METABOLIC PANEL - Abnormal; Notable for the following components:    Co2 19 (*)     Anion Gap 17.0 (*)     Glucose 143 (*)     Calcium 10.6 (*)     Correct Calcium 11.0 (*)     AST(SGOT) 106 (*)     ALT(SGPT) 58 (*)     Alkaline Phosphatase 279 (*)     All other components within normal limits   URINALYSIS - Abnormal; Notable for the following components:    Ketones 80 (*)     Protein 100 (*)     All other components within normal limits    " Narrative:     Release to patient->Immediate  Indication for culture:->Evaluation for sepsis without a  clear source of infection   LACTIC ACID - Abnormal; Notable for the following components:    Lactic Acid 3.0 (*)     All other components within normal limits   PROCALCITONIN - Abnormal; Notable for the following components:    Procalcitonin 0.31 (*)     All other components within normal limits   URINE MICROSCOPIC (W/UA) - Abnormal; Notable for the following components:    Hyaline Cast 6-10 (*)     All other components within normal limits    Narrative:     Release to patient->Immediate  Indication for culture:->Evaluation for sepsis without a  clear source of infection   DIAGNOSTIC ALCOHOL   URINE DRUG SCREEN    Narrative:     Release to patient->Immediate  Indication for culture:->Evaluation for sepsis without a  clear source of infection   COV-2, FLU A/B, AND RSV BY PCR ([employer])    Narrative:     Release to patient->Immediate   LIPASE   ESTIMATED GFR   URINE CULTURE(NEW)    Narrative:     Release to patient->Immediate  Indication for culture:->Evaluation for sepsis without a  clear source of infection   LACTIC ACID   LACTIC ACID   CBC WITH DIFFERENTIAL   OCCULT BLOOD STOOL       All labs reviewed and independently interpreted by myself    RADIOLOGY  Images independently interpreted by myself prior to radiologist review:  -CT of the head demonstrates no acute intracranial hemorrhage  -CT of the abdomen demonstrates a mass in the epigastric region extending from the liver    Final interpretation by radiology demonstrates:    CT-ABDOMEN-PELVIS WITH   Final Result      1.  Multiple ill-defined low-density liver lesions with expansion of the LEFT lobe, most likely metastases.   2.  Multiple small bibasilar pulmonary nodules concerning for metastasis as well.   3.  No focal mesenteric inflammatory process.   4.  Bilateral adnexal cystic lesions, larger on the RIGHT, serous inclusion cyst versus neoplasm.   5.  L2  lytic lesion concerning for metastasis.            CT-HEAD W/O   Final Result      1.  Mild atrophy and white matter changes.   2.  No acute intracranial hemorrhage or territorial infarct.   3.  Sphenoid sinusitis.   4.  Apparent postoperative change of RIGHT ocular globe with multiple metallic densities present.         DX-CHEST-PORTABLE (1 VIEW)   Final Result      No acute cardiopulmonary disease evident.      MR-BRAIN-WITH & W/O    (Results Pending)   MR-ABDOMEN-WITH & W/O    (Results Pending)     The radiologist's interpretation of all radiological studies have been reviewed by me.        COURSE & MEDICAL DECISION MAKING    ED Observation Status? Yes; I am placing the patient in to an observation status due to a diagnostic uncertainty as well as therapeutic intensity. Patient placed in observation status at 9:52 AM, 10/24/2023.     Observation plan is as follows: Will monitor the patient's symptoms and treat for pain while imaging and lab work results. Will reassess and update the patient after results are reviewed.      Upon Reevaluation, the patient's condition has: not improved; and will be escalated to hospitalization.    Patient discharged from ED Observation status at 12:11 PM on 10/24/2023    INITIAL ASSESSMENT AND PLAN    Patient is a 68-year-old female who comes in for evaluation of abdominal pain and seizure.  Differential diagnosis includes metastatic malignancy, intracranial hemorrhage, electrolyte derangement, dehydration, gastritis.  Diagnostic work-up includes labs, head CT and abdominal CT.       REASSESSMENTS   9:52 AM - Patient seen and examined at bedside. The patient presents following seizure earlier today. Discussed plan of care, including diagnostic workup and reassessment. The patient verbalizes understanding with the plan of care. The patient will be medicated for pain. Ordered for lab work and imaging to evaluate her symptoms. The patient was given an opportunity to ask questions.        ER COURSE AND FINAL DISPOSITION   Patient's initial vitals are notable for hypertension, she is otherwise well-appearing and neurologically intact at her baseline.  She does have sequela of seizure activity as she did have incontinence and tongue biting, therefore patient did likely have a seizure today.  At this time I will not load her with seizure medication as this is a one-time seizure however we will continue to monitor for seizure-like activity.      CT of the head returns and demonstrates no acute intracranial abnormality.  CT of the abdomen notable for what appears to be metastatic disease, likely from her melanoma.      Labs returned and are notable for elevated LFTs likely secondary to metastases on her liver.  Lactic acid is elevated at 3.0 consistent with seizure-like activity.  Labs are otherwise unremarkable.  Patient is having ongoing abdominal pain in the emergency department and is treated with fentanyl and subsequently morphine.  I discussed the case with Dr. Rooney oncologist who agrees with hospitalizing patient for pain control and MRI of the brain to assess for underlying lesion given her seizure-like activity today.  I discussed plan of care with patient and she is amenable to this plan.  I discussed the case with Dr. Swanson who has accepted patient for hospitalization.  Patient is in guarded condition.    ADDITIONAL PROBLEM LIST AND RESOURCE UTILIZATION    Additional problems aside from the chief complaint that I have addressed: None    I have discussed management of the patient with the following physicians and YIN's: Dr. Rooney, Dr. Swanson    Discussion of management with other [facility] or appropriate source(s): non     Escalation of care considered, and ultimately not performed: see above.     Barriers to care at this time, including but not limited to: none.     Decision tools and prescription drugs considered including, but not limited to: see above.    Please see review of records as noted  above      FINAL IMPRESSION  1. Epigastric pain    2. Metastatic malignant neoplasm, unspecified site (HCC)    3. Seizure (HCC)          IAlesia (Scribe), am scribing for, and in the presence of, Jessa Rene M.D..    Electronically signed by: Alesia Robertson (Scribe), 10/24/2023    IJessa M.D. personally performed the services described in this documentation, as scribed by Alesia Robertson in my presence, and it is both accurate and complete.    The note accurately reflects work and decisions made by me.  Jessa Rene M.D.  10/24/2023  12:30 PM

## 2023-10-24 NOTE — ED TRIAGE NOTES
"Pt bib ems from home.   Chief Complaint   Patient presents with    Mental Status Change    Seizure     Possible seizure     Pt found unresponsive by . Responsive to pain w/ ems then slowly coming around. +oral trauma, tongue +urinary incontinence. Currently Pt ao to person, place. Knows president. Unsure of date. Pt reports a previous seizure \"a long time ago.\"   PTA .    Pt connected to monitor. Chart up for ERP.    "
1.67

## 2023-10-24 NOTE — ASSESSMENT & PLAN NOTE
EEG -  presence of intermittent left temporal slowing is suggestive of focal neuronal dysfunction, no epileptiform discharge or seizure seen   10/25/2023  Neurochecks   Seizure precautions  IV Ativan as needed

## 2023-10-24 NOTE — ASSESSMENT & PLAN NOTE
IV Protonix  EGD - 7 cratered gastric ulcers in stomach, Gastritis/duodenitis, Hiatal hernia   10/27/2023  Colonoscopy - 5 mm polyp in the ascending colon that was removed with cold snare polypectomy, two 2 mm polyps in sigmoid that were removed with cold biopsy forceps, on retroflexion internal hemorrhoids were seen and an ulcer and erythema consistent with rectal mucosal prolapse    10/27/2023  Advance to full liquids

## 2023-10-24 NOTE — ASSESSMENT & PLAN NOTE
I had a prolonged discussion with the patient regarding goals of care, diagnoses, prognosis, and CODE STATUS. We discussed her prognosis and comorbidities. I spent 16 minutes on advanced care planning in addition to the time spent managing the other medical problems.      She requested for DNR/DNI

## 2023-10-24 NOTE — ASSESSMENT & PLAN NOTE
CT abdomen noted with Multiple ill-defined low-density liver lesions with expansion of the LEFT lobe, most likely metastases.   Multiple small bibasilar pulmonary nodules concerning for metastasis as well.    Case was discussed on-call Dr. Rooney,Dr. Woods   Will get a MRI brain, MRI abdomen

## 2023-10-24 NOTE — CONSULTS
Date of Consultation:  10/24/2023    Patient: : Malaika Garcia  MRN: [id number]    Referring Physician:  Dr. Chidi Swanson     GI:Parmjit Michaud M.D.     Reason for Consultation:   The patient is a 68 years old female with medical history of previous thyroid surgery, seizure, arthritis, presented to inpatient GI service  for possible GI bleeding and a possible GI cancer related the liver mets.    The patient also report abdominal pain for couple weeks and tarry stool for 3 to 4 days.    GI team saw the patient at bedside, the patient is receiving PPI and scheduled to have more radiology imaging study.    Hemoglobin currently stable around 14.  Elevated liver biliary enzymes consistent with liver mets.    The patient reports previous EGD and a colonoscopy of 5 to 7 years ago.  She is due for another follow-up colonoscopy.  She believes she is on a 5 years follow-up scheduled.      Past Medical History:   Diagnosis Date    Seizure (HCC) 2013    Arthritis     Possibly Rh- rheumatoid arthritis, unsure    RLS (restless legs syndrome)     Thyroid disease          Past Surgical History:   Procedure Laterality Date    THYROIDECTOMY      partial    DEBRIDEMENT Left        Family History   Problem Relation Age of Onset    Stroke Mother     Cancer Mother         Throat cancer    Hypertension Mother     Cancer Maternal Aunt         Breast cancer    Stroke Maternal Grandmother     Hypertension Maternal Grandmother     Cancer Paternal Grandfather         Colon cancer       Social History     Socioeconomic History    Marital status:     Highest education level: GED or equivalent   Tobacco Use    Smoking status: Former     Current packs/day: 0.00     Average packs/day: 1 pack/day for 20.0 years (20.0 ttl pk-yrs)     Types: Cigarettes     Start date: 2000     Quit date: 2020     Years since quittin.9    Smokeless tobacco: Never    Tobacco comments:     quit 1.5 years ago   Vaping Use    Vaping Use: Never used    Substance and Sexual Activity    Alcohol use: Yes     Alcohol/week: 1.8 oz     Types: 3 Standard drinks or equivalent per week     Comment: occasional wine or cocktail    Drug use: Not Currently     Types: Marijuana     Comment: for pain    Sexual activity: Yes     Partners: Male   Social History Narrative    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 Lives here in town with her partner. She has 3 kids who live in [place].      Social Determinants of Health     Financial Resource Strain: Medium Risk (5/7/2022)    Overall Financial Resource Strain (CARDIA)     Difficulty of Paying Living Expenses: Somewhat hard   Food Insecurity: No Food Insecurity (5/7/2022)    Hunger Vital Sign     Worried About Running Out of Food in the Last Year: Never true     Ran Out of Food in the Last Year: Never true   Transportation Needs: No Transportation Needs (5/7/2022)    PRAPARE - Transportation     Lack of Transportation (Medical): No     Lack of Transportation (Non-Medical): No   Physical Activity: Insufficiently Active (5/7/2022)    Exercise Vital Sign     Days of Exercise per Week: 2 days     Minutes of Exercise per Session: 60 min   Stress: Stress Concern Present (5/7/2022)    [employer] [place] [employer] - Occupational Stress Questionnaire     Feeling of Stress : To some extent   Social Connections: Socially Isolated (5/7/2022)    Social Connection and Isolation Panel [NHANES]     Frequency of Communication with Friends and Family: Twice a week     Frequency of Social Gatherings with Friends and Family: Once a week     Attends [employer] Services: Never     Active Member of Clubs or Organizations: No     Attends Club or Organization Meetings: Patient refused     Marital Status:    Housing Stability: Low Risk  (5/7/2022)    Housing Stability Vital Sign     Unable to Pay for Housing in the Last Year: No     Number of Places Lived in the Last Year: 1     Unstable  Housing in the Last Year: No     Constitutional: No fevers.  Ears/Nose/Throat/Mouth: No choking reported.  Cardiovascular: No chest pain reported.   Respiratory: Denies shortness of breath.  Gastrointestinal/Hepatic: Per H/P.   Skin/Breast: No new rash reported.   Psychiatric: No complaints    HEENT: grossly normal.  Cardiovascular: Normal heart rate.  Lungs: Respiratory effort is normal.   Abdomen: General abd pain, more in middle. No acute abdomen.   Skin: No erythema, No rash.  Lower limbs: normal, no pitting edema.   Neurologic: Alert & oriented x 3, Normal motor function, No focal deficits noted.  PSY: stable mood.         Physical Exam:  Vitals:    10/24/23 0938 10/24/23 1100 10/24/23 1205 10/24/23 1308   BP: (!) 149/101 (!) 164/83 (!) 177/86 (!) 189/93   Pulse: (!) 101 91 85 85   Resp: 18 15 14 12   Temp: 36.7 °C (98 °F)      TempSrc: Temporal      SpO2: 93% 94% 94% 95%   Weight:       Height:                 Labs:  Recent Labs     10/24/23  1017   WBC 9.4   RBC 4.97   HEMOGLOBIN 14.0   HEMATOCRIT 42.6   MCV 85.7   MCH 28.2   MCHC 32.9   RDW 44.6   PLATELETCT 307   MPV 11.2     Recent Labs     10/24/23  1017   SODIUM 135   POTASSIUM 3.6   CHLORIDE 99   CO2 19*   GLUCOSE 143*   BUN 16           Recent Labs     10/24/23  1017   ASTSGOT 106*   ALTSGPT 58*   TBILIRUBIN 0.3   ALKPHOSPHAT 279*   GLOBULIN 3.4         Imaging:  CT-ABDOMEN-PELVIS WITH  Narrative: 10/24/2023 10:18 AM    HISTORY/REASON FOR EXAM:  epigastric abdominal pain.    TECHNIQUE/EXAM DESCRIPTION:   CT scan of the abdomen and pelvis with contrast.    Contrast-enhanced helical scanning was obtained from the diaphragmatic domes through the pubic symphysis following the bolus administration of nonionic contrast without complication.    100 mL of Omnipaque 350 nonionic contrast was administered without complication.    Low dose optimization technique was utilized for this CT exam including automated exposure control and adjustment of the mA and/or kV  according to patient size.    COMPARISON: No prior studies available.    FINDINGS:  Lower Chest: Cluster of small nodules in the LEFT lung base measuring up to 5 mm.  RIGHT middle lobe nodule measuring 6 mm. Bilateral lower lobe nodules measuring up to 5 mm.    Liver: Heterogeneous liver with multiple low-density lesions, with expansion of the LEFT lobe, most likely metastases.    Spleen: Unremarkable.    Pancreas: Unremarkable.    Gallbladder: No calcified stones.    Biliary: Nondilated.    Adrenal glands: Normal.    Kidneys: Small RIGHT kidney cyst which no further evaluation is necessary.  LEFT kidney is unremarkable.    Bowel: No evidence of bowel obstruction.  Normal appendix.    Lymph nodes: No adenopathy.    Vasculature: Moderate atherosclerotic calcification of the common aorta with bulky calcification of the bifurcation.    Peritoneum: No peritoneal fluid or pneumoperitoneum.    Musculoskeletal: Lumbar spine degenerative changes.  Lytic lesion within L2 vertebral body.    Pelvis: Bladder is decompressed.  Uterus is grossly unremarkable.  LEFT adnexal cystic structure measuring 18 mm.  RIGHT adnexal cystic structure measuring 34 mm.  Impression: 1.  Multiple ill-defined low-density liver lesions with expansion of the LEFT lobe, most likely metastases.  2.  Multiple small bibasilar pulmonary nodules concerning for metastasis as well.  3.  No focal mesenteric inflammatory process.  4.  Bilateral adnexal cystic lesions, larger on the RIGHT, serous inclusion cyst versus neoplasm.  5.  L2 lytic lesion concerning for metastasis.  CT-HEAD W/O  Narrative: 10/24/2023 10:18 AM    HISTORY/REASON FOR EXAM:  Seizure.  Found down.    TECHNIQUE/EXAM DESCRIPTION AND NUMBER OF VIEWS:  CT of the head without contrast.    The study was performed on a helical multidetector CT scanner. Contiguous 2.5 mm axial sections were obtained from the skull base through the vertex.    Up to date radiation dose reduction adjustments have  been utilized to meet ALARA standards for radiation dose reduction.    COMPARISON:  None available    FINDINGS:  Lateral ventricles are normal in size and symmetric.  Cortical sulci are mildly prominent.  Patchy areas of low attenuation in the white matter bilaterally.    No significant mass effect or midline shift.  Basal cisterns are patent.  No evidence for intracranial hemorrhage.  Calvaria are intact.  Multiple metallic densities in the RIGHT orbit consistent with prior ocular surgery.  Visualized mastoid air cells are clear.  Sphenoid sinus mucosal thickening and bubbly secretions.  Impression: 1.  Mild atrophy and white matter changes.  2.  No acute intracranial hemorrhage or territorial infarct.  3.  Sphenoid sinusitis.  4.  Apparent postoperative change of RIGHT ocular globe with multiple metallic densities present.  DX-CHEST-PORTABLE (1 VIEW)  Narrative: 10/24/2023 10:09 AM    HISTORY/REASON FOR EXAM:  Found unresponsive..    TECHNIQUE/EXAM DESCRIPTION AND NUMBER OF VIEWS:  Single portable view of the chest.    COMPARISON: None    FINDINGS:  The soft tissues and bony structures are unremarkable.    The heart and mediastinal structures are within normal limits.    Pulmonary vascularity is normal.    The lung fields are clear.    There is no effusion or pneumothorax.  Impression: No acute cardiopulmonary disease evident.            Impressions:  68 years old female presented GI inpatient service for GI bleeding and the possible GI cancer related liver mets.    She reports she had a EGD and colonoscopy about 7 years ago.  She is due for another colonoscopy.  No specific pathology found upper GI based on her memory.    Coffee-ground for 3 days abdominal pain for more than 10 days.  Hemoglobin currently stable.  Mild nausea but no vomiting.  Feels thirsty.    GI team will offer inpatient upper GI and colonoscopy at the same time.  We suspect that it is not safe to do colonoscopy preparation today due to her  possible seizure and the multiple imaging study waiting for her.    GI team will suggest IV PPI and other supportive care if brisk bleeding is noted please call us back.  Plan to start the preparation on October 25 and schedule patient for upper GI and a colonoscopy on October 26.    Inpatient GI team will continue to follow up.       This note was generated using voice recognition software which has a small chance of producing errors of grammar and possibly content. I have made every reasonable attempt to find and correct any obvious errors, but expect that some may not be found prior to finalization of this note.

## 2023-10-24 NOTE — H&P
Hospital Medicine History & Physical Note    Date of Service  10/24/2023    Primary Care Physician  Oly Wang P.A.-C.    Consultants  GI and oncology    Specialist Names: Dr Michaud , Dr. Rooney    Code Status  DNAR/DNI    Chief Complaint  Chief Complaint   Patient presents with    Mental Status Change    Seizure     Possible seizure       History of Presenting Illness  Malaika Garcia is a 68 y.o. female with past medical history of melanoma, seizure disorder who presented 10/24/2023 with 2 weeks history of abdominal pain and with black stool for last 3 days.  She had episode of seizure today while she was coming to the ED for evaluation which was witnessed by her .  She denies fever, chest pain, shortness of breath, nausea/vomiting, diarrhea.  She had a remote history of seizure 10 years ago    On arrival to ED she remained hypertensive.  Labs reviewed and noted normal white count, hemoglobin 14, bicarbonate 19, anion gap 17, elevated liver enzymes, elevated lactic acid 3.  Chest x-ray unremarkable, CT abdomen noted with possible liver mets and spine met.    ED physician discussed with Dr. Rooney recommended MRI brain to rule out brain met.  Case was discussed with Dr. Woods recommended to get MRI abdomen..    I discussed case with gastroenterology Dr. Michaud for possible upper GI bleed.  GI to evaluate for further recommendation      I spoke and discussed the case with the ER physician, Dr. Rene .    Patient will be admitted to the hospital for further evaluation and management for abdominal pain, seizure, acute GI bleed.  Need further work-up including MRI brain, MRI abdomen.  Serial H&H monitoring for suspected acute GI bleed.  Need to be on IV Protonix .  Need remote telemetry monitoring for possible seizure.    Need close monitoring of blood counts, electrolytes and renal function   High risk of deterioration need to be monitor closely under telemonitor           I discussed the plan of care with  patient and family.    Review of Systems  Review of Systems   Gastrointestinal:  Positive for abdominal pain and melena.   Neurological:  Positive for seizures.       Past Medical History   has a past medical history of Arthritis, RLS (restless legs syndrome), Seizure (HCC) (08/2013), and Thyroid disease.    Surgical History   has a past surgical history that includes thyroidectomy (1994) and debridement (Left).     Family History  family history includes Cancer in her maternal aunt, mother, and paternal grandfather; Hypertension in her maternal grandmother and mother; Stroke in her maternal grandmother and mother.   Family history reviewed with patient. There is no family history that is pertinent to the chief complaint.     Social History   reports that she quit smoking about 2 years ago. Her smoking use included cigarettes. She started smoking about 22 years ago. She has a 20.0 pack-year smoking history. She has never used smokeless tobacco. She reports current alcohol use of about 1.8 oz of alcohol per week. She reports that she does not currently use drugs after having used the following drugs: Marijuana.    Allergies  Allergies   Allergen Reactions    Keflex Unspecified     Cannot remember    Sulfa Drugs Hives       Medications  Prior to Admission Medications   Prescriptions Last Dose Informant Patient Reported? Taking?   Acetaminophen 500 MG Cap 10/23/2023 at pm Patient, Family Member Yes Yes   Sig: Take 1,000 mg by mouth 3 times a day as needed (alternating with IBU). Indications: Pain   ibuprofen (MOTRIN) 200 MG Tab 10/23/2023 at pm Patient, Family Member Yes Yes   Sig: Take 400-600 mg by mouth 3 times a day as needed for Mild Pain (alternating with Tylenol).      Facility-Administered Medications: None       Physical Exam  Temp:  [36.7 °C (98 °F)] 36.7 °C (98 °F)  Pulse:  [] 85  Resp:  [14-18] 14  BP: (149-177)/() 177/86  SpO2:  [93 %-94 %] 94 %  Blood Pressure : (!) 177/86   Temperature: 36.7  "°C (98 °F)   Pulse: 85   Respiration: 14   Pulse Oximetry: 94 %       Physical Exam  Constitutional:       Appearance: She is ill-appearing.   HENT:      Mouth/Throat:      Mouth: Mucous membranes are dry.   Cardiovascular:      Rate and Rhythm: Normal rate and regular rhythm.      Pulses: Normal pulses.   Pulmonary:      Effort: Pulmonary effort is normal.      Breath sounds: Normal breath sounds.   Abdominal:      General: Abdomen is flat.      Tenderness: There is abdominal tenderness.   Musculoskeletal:      Right lower leg: No edema.      Left lower leg: No edema.   Neurological:      General: No focal deficit present.      Mental Status: She is alert and oriented to person, place, and time.   Psychiatric:         Mood and Affect: Mood normal.         Laboratory:  Recent Labs     10/24/23  1017   WBC 9.4   RBC 4.97   HEMOGLOBIN 14.0   HEMATOCRIT 42.6   MCV 85.7   MCH 28.2   MCHC 32.9   RDW 44.6   PLATELETCT 307   MPV 11.2     Recent Labs     10/24/23  1017   SODIUM 135   POTASSIUM 3.6   CHLORIDE 99   CO2 19*   GLUCOSE 143*   BUN 16   CREATININE 0.97   CALCIUM 10.6*     Recent Labs     10/24/23  1017   ALTSGPT 58*   ASTSGOT 106*   ALKPHOSPHAT 279*   TBILIRUBIN 0.3   LIPASE 34   GLUCOSE 143*         No results for input(s): \"NTPROBNP\" in the last 72 hours.      No results for input(s): \"TROPONINT\" in the last 72 hours.    Imaging:  CT-ABDOMEN-PELVIS WITH   Final Result      1.  Multiple ill-defined low-density liver lesions with expansion of the LEFT lobe, most likely metastases.   2.  Multiple small bibasilar pulmonary nodules concerning for metastasis as well.   3.  No focal mesenteric inflammatory process.   4.  Bilateral adnexal cystic lesions, larger on the RIGHT, serous inclusion cyst versus neoplasm.   5.  L2 lytic lesion concerning for metastasis.            CT-HEAD W/O   Final Result      1.  Mild atrophy and white matter changes.   2.  No acute intracranial hemorrhage or territorial infarct.   3.  " Sphenoid sinusitis.   4.  Apparent postoperative change of RIGHT ocular globe with multiple metallic densities present.         DX-CHEST-PORTABLE (1 VIEW)   Final Result      No acute cardiopulmonary disease evident.      MR-BRAIN-WITH & W/O    (Results Pending)   MR-ABDOMEN-WITH & W/O    (Results Pending)       X-Ray:  I have personally reviewed the images and compared with prior images.    Assessment/Plan:  Justification for Admission Status  I anticipate this patient will require at least two midnights for appropriate medical management, necessitating inpatient admission   for further evaluation and management for abdominal pain, seizure, acute GI bleed.  Need further work-up including MRI brain, MRI abdomen.  Serial H&H monitoring for suspected acute GI bleed.  Need to be on IV Protonix .  Need remote telemetry monitoring for possible seizure.    Need close monitoring of blood counts, electrolytes and renal function   High risk of deterioration need to be monitor closely under telemonitor             * Metastasis to liver (HCC)  Assessment & Plan  Admit inpatient oncology  Noted on CT  Oncology on board  MRCP pending    Upper GI bleed  Assessment & Plan  Reports of melena for 3 days, H&H is stable.  -Monitor CBC Q8H and transfuse as necessary  -Insert to wide pore peripheral IV accesses    -Guaiac test  -Monitor INR  -PPI therapy  -Continue IVF  -GI consulted  for EGD and colonoscopy  -NPO effective midnight  -Supportive care    Currently no concern of active GI bleed.  We will continue chemical prophylaxis and monitor H&H                  Lactic acid acidosis  Assessment & Plan  Likely due to seizure episode  On IV fluid  Trend lactate    Goals of care, counseling/discussion  Assessment & Plan  I had a prolonged discussion with the patient regarding goals of care, diagnoses, prognosis, and CODE STATUS. We discussed her prognosis and comorbidities. I spent 16 minutes on advanced care planning in addition to the  time spent managing the other medical problems.      She requested for DNR/DNI    Metastatic cancer to spine (HCC)  Assessment & Plan  L2 lytic lesion concerning for metastasis.  Oncology on board    Melanoma (HCC)  Assessment & Plan  Follows with Dr. Rooney    Seizure (HCC)- (present on admission)  Assessment & Plan  History of remote seizure 10 years ago  We will get MRI head  Start on Keppra  On lorazepam as needed's for seizure  Neurology evaluation after MRI result  On remote cardiac monitor        Abdominal pain- (present on admission)  Assessment & Plan  CT abdomen noted with Multiple ill-defined low-density liver lesions with expansion of the LEFT lobe, most likely metastases.   Multiple small bibasilar pulmonary nodules concerning for metastasis as well.    Case was discussed on-call Dr. Rooney,Dr. 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 Rooney        VTE prophylaxis: SCDs/TEDs and enoxaparin ppx    I independently reviewed pertinent clinical lab tests since admission and ordered additional follow up clinical lab tests.  Admission order was placed.  Labs ordered for follow-up.  I independently reviewed pertinent radiographic images and the radiologist's reports since admission and ordered additional follow up radiographic studies.  The details of the available patient records in [facility] (including laboratory tests, culture data, medications, imaging, and other pertinent diagnostic tests) and that information was utilized as warranted in today's medical decision making for this patient.  I personally evaluated the patient condition at bedside.     Care interventions include:   Review of interval medical and surgical history, current medications and outpatient medication reconciliation, interval imaging studies and radiologist interpretation, and interval laboratory values.

## 2023-10-24 NOTE — ED NOTES
Med rec is complete per Patient at bedside.  Visitor was present at time of interview with permission from Patient.    Allergies reviewed.    Has patient had any outside antibiotics in the last 30 days? N    Any Anticoagulants (rivaroxaban, apixaban, edoxaban, dabigatran, warfarin, enoxaparin) taken in the last 14 days? N          Preferred pharmacy for this visit : [facility] [phone]

## 2023-10-25 NOTE — PROGRESS NOTES
4 Eyes Skin Assessment Completed by Arabella NATION RN and Lou JOHNSON RN.    Head WDL  Ears WDL  Nose WDL  Mouth WDL  Neck WDL  Breast/Chest WDL  Shoulder Blades WDL  Spine WDL  (R) Arm/Elbow/Hand WDL  (L) Arm/Elbow/Hand WDL  Abdomen WDL  Groin WDL  Scrotum/Coccyx/Buttocks WDL  (R) Leg WDL  (L) Leg WDL  (R) Heel/Foot/Toe WDL  (L) Heel/Foot/Toe WDL          Devices In Places Tele Box      Interventions In Place N/A    Possible Skin Injury No    Pictures Uploaded Into Epic N/A  Wound Consult Placed N/A  RN Wound Prevention Protocol Ordered No

## 2023-10-25 NOTE — THERAPY
Physical Therapy   Initial Evaluation     Patient Name: Malaika Garcia  Age:  68 y.o., Sex:  female  Medical Record #: [id number]  Today's Date: 10/25/2023     Precautions  Precautions: Fall Risk  Comments: impaired depth perception, seizure prx    Assessment  Patient is 68 y.o. female admitted with 2 weeks history of abdominal pain and episode of seizure. Pt found to have GI bleed and a possible GI cancer related to liver mets. 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 Pt is independent with functional mobility at baseline using no AD. During current session, pt presents near functional baseline requiring overall SBA-SPV for functional mobility as detailed below. No LOB or safety issues noted. Recommend d/c to home with no further PT needs. Patient will not be actively followed for physical therapy services at this time.    Plan    Physical Therapy Initial Treatment Plan   Duration: Evaluation only    DC Equipment Recommendations: None  Discharge Recommendations: Anticipate that the patient will have no further physical therapy needs after discharge from the hospital       Subjective    Pt received resting in bed, agreeable to participate.      Objective       10/25/23 1147   Charge Group   PT Evaluation PT Evaluation High   Total Time Spent   PT Evaluation Time Spent (Mins) 15   PT Total Time Spent (Calculated) 15   Initial Contact Note    Initial Contact Note Order Received and Verified, Evaluation Only - Patient Does Not Require Further Acute Physical Therapy at this Time.  However, May Benefit from Post Acute Therapy for Higher Level Functional Deficits.   Precautions   Precautions Fall Risk   Comments impaired depth perception, seizure prx   Vitals   O2 Delivery Device None - Room Air   Pain 0 - 10 Group   Therapist Pain Assessment Post Activity Pain Same as Prior to Activity;Nurse Notified  (c/o continued abdominal pain but manageable)   Prior Living Situation   Prior Services  Home-Independent   Housing / Facility 1 Story House   Steps Into Home 3   Rail Left Rail  (Steps into Home)   Equipment Owned Single Point Cane   Lives with - Patient's Self Care Capacity Significant Other   Comments Lives with SO in [place]   Prior Level of Functional Mobility   Bed Mobility Independent   Transfer Status Independent   Ambulation Independent   Ambulation Distance community as tolerated   Assistive Devices Used None   Stairs Independent   Cognition    Cognition / Consciousness WDL   Level of Consciousness Alert   Comments pleasant and cooperative   Passive ROM Lower Body   Passive ROM Lower Body WDL   Comments assessed functionally   Active ROM Lower Body    Active ROM Lower Body  WDL   Comments assessed functionally   Strength Lower Body   Lower Body Strength  WDL   Comments assessed functionally   Sensation Lower Body   Lower Extremity Sensation   WDL   Comments pt denied any BLE changes in sensation   Lower Body Muscle Tone   Lower Body Muscle Tone  WDL   Comments assessed functionally   Coordination Lower Body    Coordination Lower Body  WDL   Comments assessed functionally   Vision   Vision Comments pt reported impaired depth perception, required verbal cues to avoid walking into wall x1 time   Balance Assessment   Sitting Balance (Static) Good   Sitting Balance (Dynamic) Fair +   Standing Balance (Static) Fair +   Standing Balance (Dynamic) Fair   Weight Shift Sitting Good   Weight Shift Standing Good   Comments standing with IV pole support   Bed Mobility    Supine to Sit Supervised   Sit to Supine Supervised   Scooting Supervised   Gait Analysis   Gait Level Of Assist Standby Assist   Assistive Device Other (Comments)  (IV pole)   Distance (Feet) 200   # of Times Distance was Traveled 1   Deviation   (normal gait speed)   # of Stairs Climbed 0   Functional Mobility   Sit to Stand Supervised   Bed, Chair, Wheelchair Transfer Supervised   Toilet Transfers Standby Assist   Transfer Method  Stand Step   Mobility up with IV pole in room, hallway, bathroom   How much difficulty does the patient currently have...   Turning over in bed (including adjusting bedclothes, sheets and blankets)? 3   Sitting down on and standing up from a chair with arms (e.g., wheelchair, bedside commode, etc.) 4   Moving from lying on back to sitting on the side of the bed? 3   How much help from another person does the patient currently need...   Moving to and from a bed to a chair (including a wheelchair)? 3   Need to walk in a hospital room? 3   Climbing 3-5 steps with a railing? 3   6 clicks Mobility Score 19   Education Group   Education Provided Role of Physical Therapist   Role of Physical Therapist Patient Response Patient;Acceptance;Explanation;Demonstration;Verbal Demonstration;Action Demonstration   Additional Comments educated pt re: role of acute care PT, discussed options for continued therapy upon d/c home which pt politely declined   Physical Therapy Initial Treatment Plan    Duration Evaluation only   Problem List    Problems None   Anticipated Discharge Equipment and Recommendations   DC Equipment Recommendations None   Discharge Recommendations Anticipate that the patient will have no further physical therapy needs after discharge from the hospital   Interdisciplinary Plan of Care Collaboration   IDT Collaboration with  Nursing;Occupational Therapist   Patient Position at End of Therapy In Bed;Bed Alarm On;Call Light within Reach;Tray Table within Reach   Collaboration Comments RN updated

## 2023-10-25 NOTE — PROGRESS NOTES
..Gastroenterology Progress Note               Author:  ROSALINE Ricardo Date & Time Created: 10/25/2023 1:04 PM       Patient ID:  Name:             Malaika Garcia    YOB: 1955  Age:                 68 y.o.  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  No complaints, has not had a bowel movement.  Hemoglobin 11.5 today.  CT abdomen pelvis reveals numerous lesions in the liver, lungs, and bar lytic lesions concerning for metastasis.    Hospital Medications:  Current Facility-Administered Medications   Medication Dose Frequency Provider Last Rate Last Admin    acetaminophen (Tylenol) tablet 500 mg  500 mg Q6HRS PRN Carrington Boyle M.D.        LORazepam (Ativan) injection 1 mg  1 mg Q2HRS PRN Carrington Boyle M.D.        polyethylene glycol-electrolytes (Golytely) solution 4 L  4 L Once ROSALINE Ricardo        labetalol (Normodyne/Trandate) injection 10 mg  10 mg Q4HRS PRN Chidi Swanson M.D.   10 mg at 10/24/23 1317    ondansetron (Zofran) syringe/vial injection 4 mg  4 mg Q4HRS PRN Chidi Swanson M.D.        ondansetron (Zofran ODT) dispertab 4 mg  4 mg Q4HRS PRN Chidi Swanson M.D.        pantoprazole (Protonix) injection 40 mg  40 mg BID Chidi Swanson M.D.   40 mg at 10/25/23 5926    Pharmacy Consult Request ...Pain Management Review 1 Each  1 Each PHARMACY TO DOSE Chidi Swanson M.D.        oxyCODONE immediate-release (Roxicodone) tablet 2.5 mg  2.5 mg Q3HRS PRN Chidi Swanson M.D.        Or    oxyCODONE immediate-release (Roxicodone) tablet 5 mg  5 mg Q3HRS PRN Chidi Swanson M.D.   5 mg at 10/25/23 1002    Or     "HYDROmorphone (Dilaudid) injection 0.25 mg  0.25 mg Q3HRS PRN Chidi Swanson M.D.   0.25 mg at 10/25/23 0504   Last reviewed on 10/24/2023 11:49 AM by Guadalupe Kearney FRAN       Review of Systems:  Review of Systems   Constitutional:  Negative for chills, fever and malaise/fatigue.   HENT:  Negative for hearing loss.    Eyes:  Negative for blurred vision.   Respiratory:  Negative for cough and shortness of breath.    Cardiovascular:  Negative for chest pain and leg swelling.   Gastrointestinal:  Negative for abdominal pain, blood in stool, constipation, diarrhea, heartburn, melena, nausea and vomiting.   Genitourinary:  Negative for dysuria.   Musculoskeletal:  Negative for back pain.   Skin:  Negative for rash.   Neurological:  Negative for dizziness and weakness.   Psychiatric/Behavioral:  Negative for depression.    All other systems reviewed and are negative.        Vital signs:  Weight/BMI: Body mass index is 28.25 kg/m².  /73   Pulse 92   Temp 36.4 °C (97.6 °F) (Temporal)   Resp 17   Ht 1.676 m (5' 6\")   Wt 79.4 kg (175 lb)   SpO2 94%   Vitals:    10/24/23 2043 10/25/23 0031 10/25/23 0337 10/25/23 0743   BP: 120/65 107/53 136/63 126/73   Pulse: 71 78 80 92   Resp: 16 16 16 17   Temp: 36 °C (96.8 °F) 36.2 °C (97.2 °F) 36.3 °C (97.3 °F) 36.4 °C (97.6 °F)   TempSrc: Temporal Temporal Temporal Temporal   SpO2: 96% 96% 93% 94%   Weight:       Height:         Oxygen Therapy:  Pulse Oximetry: 94 %, O2 (LPM): 0, O2 Delivery Device: None - Room Air  No intake or output data in the 24 hours ending 10/25/23 1304      Physical Exam:  Physical Exam  Vitals and nursing note reviewed.   Constitutional:       General: She is not in acute distress.     Appearance: Normal appearance. She is ill-appearing.   HENT:      Head: Normocephalic and atraumatic.      Right Ear: External ear normal.      Left Ear: External ear normal.      Nose: Nose normal.      Mouth/Throat:      Mouth: Mucous membranes are moist.      " Pharynx: Oropharynx is clear.   Eyes:      General: No scleral icterus.  Cardiovascular:      Rate and Rhythm: Normal rate and regular rhythm.      Pulses: Normal pulses.      Heart sounds: Normal heart sounds.   Pulmonary:      Effort: Pulmonary effort is normal. No respiratory distress.      Breath sounds: Normal breath sounds.   Abdominal:      General: Abdomen is flat. Bowel sounds are normal. There is no distension.      Palpations: Abdomen is soft.      Tenderness: There is abdominal tenderness.   Musculoskeletal:         General: Normal range of motion.      Cervical back: Normal range of motion.   Skin:     General: Skin is warm and dry.      Capillary Refill: Capillary refill takes less than 2 seconds.      Coloration: Skin is pale.   Neurological:      Mental Status: She is alert and oriented to person, place, and time.      Motor: Weakness present.   Psychiatric:         Mood and Affect: Mood normal.         Behavior: Behavior normal.             Labs:  Recent Labs     10/24/23  1017 10/25/23  0627   SODIUM 135 139   POTASSIUM 3.6 3.9   CHLORIDE 99 107   CO2 19* 22   BUN 16 14   CREATININE 0.97 0.94   CALCIUM 10.6* 8.2*     Recent Labs     10/24/23  1017 10/25/23  0627   ALTSGPT 58*  --    ASTSGOT 106*  --    ALKPHOSPHAT 279*  --    TBILIRUBIN 0.3  --    LIPASE 34  --    GLUCOSE 143* 97     Recent Labs     10/24/23  1017 10/24/23  1921 10/25/23  0627   WBC 9.4 10.1 7.6   NEUTSPOLYS 77.00* 71.90 64.30   LYMPHOCYTES 15.00* 17.20* 23.30   MONOCYTES 6.90 9.90 10.80   EOSINOPHILS 0.10 0.20 0.70   BASOPHILS 0.60 0.50 0.50   ASTSGOT 106*  --   --    ALTSGPT 58*  --   --    ALKPHOSPHAT 279*  --   --    TBILIRUBIN 0.3  --   --      Recent Labs     10/24/23  1017 10/24/23  1921 10/25/23  0627   RBC 4.97 4.42 4.19*   HEMOGLOBIN 14.0 12.2 11.5*   HEMATOCRIT 42.6 38.7 36.8*   PLATELETCT 307 306 253     Recent Results (from the past 24 hour(s))   LACTIC ACID    Collection Time: 10/24/23  7:21 PM   Result Value Ref  Range    Lactic Acid 1.3 0.5 - 2.0 mmol/L   CBC WITH DIFFERENTIAL    Collection Time: 10/24/23  7:21 PM   Result Value Ref Range    WBC 10.1 4.8 - 10.8 K/uL    RBC 4.42 4.20 - 5.40 M/uL    Hemoglobin 12.2 12.0 - 16.0 g/dL    Hematocrit 38.7 37.0 - 47.0 %    MCV 87.6 81.4 - 97.8 fL    MCH 27.6 27.0 - 33.0 pg    MCHC 31.5 (L) 32.2 - 35.5 g/dL    RDW 47.0 35.9 - 50.0 fL    Platelet Count 306 164 - 446 K/uL    MPV 10.5 9.0 - 12.9 fL    Neutrophils-Polys 71.90 44.00 - 72.00 %    Lymphocytes 17.20 (L) 22.00 - 41.00 %    Monocytes 9.90 0.00 - 13.40 %    Eosinophils 0.20 0.00 - 6.90 %    Basophils 0.50 0.00 - 1.80 %    Immature Granulocytes 0.30 0.00 - 0.90 %    Nucleated RBC 0.00 0.00 - 0.20 /100 WBC    Neutrophils (Absolute) 7.28 1.82 - 7.42 K/uL    Lymphs (Absolute) 1.74 1.00 - 4.80 K/uL    Monos (Absolute) 1.00 (H) 0.00 - 0.85 K/uL    Eos (Absolute) 0.02 0.00 - 0.51 K/uL    Baso (Absolute) 0.05 0.00 - 0.12 K/uL    Immature Granulocytes (abs) 0.03 0.00 - 0.11 K/uL    NRBC (Absolute) 0.00 K/uL   LACTIC ACID    Collection Time: 10/25/23 12:11 AM   Result Value Ref Range    Lactic Acid 1.5 0.5 - 2.0 mmol/L   Basic Metabolic Panel (BMP)    Collection Time: 10/25/23  6:27 AM   Result Value Ref Range    Sodium 139 135 - 145 mmol/L    Potassium 3.9 3.6 - 5.5 mmol/L    Chloride 107 96 - 112 mmol/L    Co2 22 20 - 33 mmol/L    Glucose 97 65 - 99 mg/dL    Bun 14 8 - 22 mg/dL    Creatinine 0.94 0.50 - 1.40 mg/dL    Calcium 8.2 (L) 8.5 - 10.5 mg/dL    Anion Gap 10.0 7.0 - 16.0   CBC WITH DIFFERENTIAL    Collection Time: 10/25/23  6:27 AM   Result Value Ref Range    WBC 7.6 4.8 - 10.8 K/uL    RBC 4.19 (L) 4.20 - 5.40 M/uL    Hemoglobin 11.5 (L) 12.0 - 16.0 g/dL    Hematocrit 36.8 (L) 37.0 - 47.0 %    MCV 87.8 81.4 - 97.8 fL    MCH 27.4 27.0 - 33.0 pg    MCHC 31.3 (L) 32.2 - 35.5 g/dL    RDW 47.3 35.9 - 50.0 fL    Platelet Count 253 164 - 446 K/uL    MPV 10.6 9.0 - 12.9 fL    Neutrophils-Polys 64.30 44.00 - 72.00 %    Lymphocytes 23.30  22.00 - 41.00 %    Monocytes 10.80 0.00 - 13.40 %    Eosinophils 0.70 0.00 - 6.90 %    Basophils 0.50 0.00 - 1.80 %    Immature Granulocytes 0.40 0.00 - 0.90 %    Nucleated RBC 0.00 0.00 - 0.20 /100 WBC    Neutrophils (Absolute) 4.88 1.82 - 7.42 K/uL    Lymphs (Absolute) 1.77 1.00 - 4.80 K/uL    Monos (Absolute) 0.82 0.00 - 0.85 K/uL    Eos (Absolute) 0.05 0.00 - 0.51 K/uL    Baso (Absolute) 0.04 0.00 - 0.12 K/uL    Immature Granulocytes (abs) 0.03 0.00 - 0.11 K/uL    NRBC (Absolute) 0.00 K/uL   LACTIC ACID    Collection Time: 10/25/23  6:27 AM   Result Value Ref Range    Lactic Acid 1.2 0.5 - 2.0 mmol/L   ESTIMATED GFR    Collection Time: 10/25/23  6:27 AM   Result Value Ref Range    GFR (CKD-EPI) 65 >60 mL/min/1.73 m 2       Radiology Review:  CT-ABDOMEN-PELVIS WITH   Final Result      1.  Multiple ill-defined low-density liver lesions with expansion of the LEFT lobe, most likely metastases.   2.  Multiple small bibasilar pulmonary nodules concerning for metastasis as well.   3.  No focal mesenteric inflammatory process.   4.  Bilateral adnexal cystic lesions, larger on the RIGHT, serous inclusion cyst versus neoplasm.   5.  L2 lytic lesion concerning for metastasis.            CT-HEAD W/O   Final Result      1.  Mild atrophy and white matter changes.   2.  No acute intracranial hemorrhage or territorial infarct.   3.  Sphenoid sinusitis.   4.  Apparent postoperative change of RIGHT ocular globe with multiple metallic densities present.         DX-CHEST-PORTABLE (1 VIEW)   Final Result      No acute cardiopulmonary disease evident.      MR-ABDOMEN-WITH & W/O    (Results Pending)   MR-BRAIN-WITH & W/O    (Results Pending)         MDM (Data Review):   -Records reviewed and summarized in current documentation  -I personally reviewed and interpreted the laboratory results  -I personally reviewed the radiology images    Assessment/Recommendations:  Assessment:  68-year-old female with GI bleeding possibly related to  metastatic liver cancer.  Last EGD and colonoscopy about 7 years ago.  Patient has had coffee-ground emesis for 3 days and abdominal pain for more than 10 days, hemoglobin currently stable.    Recommendations:  Continue IV PPI twice daily  Clear liquid diet today  GoLytely prep at 1600  N.p.o. at midnight  EGD and colonoscopy tomorrow with Dr. Michaud    Plan discussed with patient, RN, Dr. Boyle, Dr. Michaud    Core Quality Measures   Reviewed items::  Labs, Medications and Radiology reports reviewed

## 2023-10-25 NOTE — CARE PLAN
The patient is Watcher - Medium risk of patient condition declining or worsening    Shift Goals  Clinical Goals: pain control, safety  Patient Goals: pain control, rest  Family Goals: N/A    Progress made toward(s) clinical / shift goals:    Problem: Pain - Standard  Goal: Alleviation of pain or a reduction in pain to the patient’s comfort goal  Outcome: Progressing  Note: Pain assessed using 0-10 pain scale, pt medicated per MAR,      Problem: Knowledge Deficit - Standard  Goal: Patient and family/care givers will demonstrate understanding of plan of care, disease process/condition, diagnostic tests and medications  Outcome: Progressing  Note: Pt educated on plan of care, pt verbalizes understanding and agrees to comply.        Patient is not progressing towards the following goals:

## 2023-10-25 NOTE — THERAPY
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 female admitted with possible seizure and 3 days of dark stools, hx metastatic melanoma to liver, bone, eye and possibly lung. Pt presents to OT eval able to demonstrate all self-care ADLs and household distance mobility at her baseline of functional independence.  Pt has no additional acute OT needs at this time.       Plan    Occupational Therapy Initial Treatment Plan   Duration: Discharge Needs Only    DC Equipment Recommendations: None  Discharge Recommendations: Anticipate that the patient will have no further occupational therapy needs after discharge from the hospital      Objective       10/25/23 1145   Prior Living Situation   Prior Services None   Housing / Facility Mobile Home   Steps Into Home 3   Rail Left Rail  (Steps into Home)   Equipment Owned Single Point Cane   Lives with - Patient's Self Care Capacity Significant Other   Comments lives with SO in [place]   Prior Level of ADL Function   Self Feeding Independent   Grooming / Hygiene Independent   Bathing Independent   Dressing Independent   Toileting Independent   Prior Level of IADL Function   Medication Management Independent   Laundry Independent   Kitchen Mobility Independent   Finances Independent   Home Management Independent   Shopping Independent   Prior Level Of Mobility Independent Without Device in Community   Driving / Transportation Relatives / Others Provide Transportation   Occupation (Pre-Hospital Vocational) Retired Due To Age   Precautions   Precautions Fall Risk   Comments baseline depth perception deficits 2/2 L eye blind   Pain 0 - 10 Group   Therapist Pain Assessment Post Activity Pain Same as Prior to Activity;Nurse Notified;0   Cognition     Cognition / Consciousness WDL   Level of Consciousness Alert   Strength Upper Body   Upper Body Strength  WDL   Sensation Upper Body   Upper Extremity Sensation  WDL   Upper Body Muscle Tone   Upper Body Muscle Tone  WDL   Coordination Upper Body   Coordination WDL   Balance Assessment   Sitting Balance (Static) Good   Sitting Balance (Dynamic) Fair +   Standing Balance (Static) Fair +   Standing Balance (Dynamic) Fair   Weight Shift Sitting Good   Weight Shift Standing Good   Comments standing with IV pole   Bed Mobility    Supine to Sit Supervised   Sit to Supine Supervised   Scooting Supervised   ADL Assessment   Eating Supervision   Grooming Supervision;Standing   Upper Body Dressing Supervision   Lower Body Dressing Supervision   Toileting Supervision   How much help from another person does the patient currently need...   Putting on and taking off regular lower body clothing? 4   Bathing (including washing, rinsing, and drying)? 4   Toileting, which includes using a toilet, bedpan, or urinal? 4   Putting on and taking off regular upper body clothing? 4   Taking care of personal grooming such as brushing teeth? 4   Eating meals? 4   6 Clicks Daily Activity Score 24   Functional Mobility   Sit to Stand Supervised   Bed, Chair, Wheelchair Transfer Supervised   Toilet Transfers Supervised   Transfer Method Stand Step   Activity Tolerance   Comments functional for self-care at her baseline   Education Group   Education Provided Activities of Daily Living;Role of Occupational Therapist   Role of Occupational Therapist Patient Response Patient;Acceptance;Explanation;Verbal Demonstration   ADL Patient Response Patient;Acceptance;Explanation;Verbal Demonstration;Action Demonstration   Occupational Therapy Initial Treatment Plan    Duration Discharge Needs Only   Problem List   Problem List None   Anticipated Discharge Equipment and Recommendations   DC Equipment Recommendations None   Discharge Recommendations  Anticipate that the patient will have no further occupational therapy needs after discharge from the hospital

## 2023-10-25 NOTE — DISCHARGE PLANNING
HTH/SCP TCN chart review completed. Collaborated with COURTNEY Wetzel prior to meeting with the pt. The most current review of medical record, knowledge of pt's PLOF and social support, LACE+ score of 58, 6 clicks scores of 20 mobility were considered.      Pt seen at bedside. Introduced TCN program. Provided education regarding post acute levels of care. Discussed HTH/SCP plan benefits (Meds to Beds, medical uber and [facility] transitional care). Pt verbalizes understanding.     Pt reports no functional concerns with dc to home once medically cleared. Pt reports that she has remained ambulatory with no AD since admission and reports no concerns with transportation to home or for outpatient follow ups if indicated at time of dc to home. Note that at time of writing this note, PT has evaluated as well with recs for no additional needs.    Given aforementioned, no additional provider requests at this time and no choice indicated. TCN will continue to follow and collaborate with discharge planning team as additional post acute needs arise. Thank you.     Completed today:  PT with recommendations for no additional needs on 10/25  Choice obtained: none indicated  SCP with [facility] PCP.

## 2023-10-25 NOTE — PROCEDURES
VIDEO ELECTROENCEPHALOGRAM REPORT      Referring provider: Dr. Boyle    DOS: 10/25/23 (total recording of 25 minutes).     INDICATION:  Malaika Garcia 68 y.o. female presenting with seizure     CURRENT ANTIEPILEPTIC REGIMEN: LEV    TECHNIQUE: 30 channel video electroencephalogram (EEG) was performed in accordance with the international 10-20 system. The study was reviewed in bipolar and referential montages. The recording examined the patient during   awake, drowsy and sleep states    DESCRIPTION OF THE RECORD:  During brief wakefulness, the background showed a symmetrical 9 Hz alpha activity posteriorly with amplitude of 70 mV.  There was reactivity to eye closure/opening.  A normal anterior-posterior gradient was noted with faster beta frequencies seen anteriorly.  During drowsiness, increased theta/beta frequencies were seen. During the sleep state,symmetrical sleep spindles and vertex sharps were seen in the leads over the central regions. No slow wave stage seen.     ACTIVATION PROCEDURES:     hyperventilation was not performed    Intermittent Photic stimulation was performed in a stepwise fashion from 1 to 30 Hz and elicited no photic driving response.     ICTAL AND/OR INTERICTAL FINDINGS:   No focal or generalized epileptiform activity noted.   Intermittent left temporal slowing was seen during this routine study.    No clinical events or seizures were reported or recorded during the study.     EKG: sampling of the EKG recording demonstrated sinus rhythm.     EVENTS: none    INTERPRETATION:    This is an abnormal video EEG recording in the awake, drowsy and sleep states.  The presence of intermittent left temporal slowing is suggestive of focal neuronal dysfunction.   No epileptiform discharge or seizure seen. This  does not rule out epilepsy.  If the clinical suspicion remains high for seizures, a prolonged recording to capture clinical or subclinical events may be helpful.    Isak Escobar MD  Diplomate in  Neurology&Epilepsy  Office: [phone]  Fax: [phone]

## 2023-10-25 NOTE — PROGRESS NOTES
Hospital Medicine Daily Progress Note    Date of Service  10/25/2023    Chief Complaint  Malaika Garcia is a 68 y.o. female admitted 10/24/2023 with Seizure    Hospital Course  Admitted with possible seizure.  Also with complaints of dark stools for the past 3 days.  Patient with known history of metastatic melanoma to liver, bone, eye, and possibly to lung.    Interval Problem Update  Seizure - no episodes here  GIB - no BM yet, hgb 11.5    Updates given and plan of care discussed with patient's spouse who was at bedside.    I have discussed this patient's plan of care and discharge plan at IDT rounds today with Case Management, Nursing, Nursing leadership, and other members of the IDT team.    Consultants/Specialty  GI    Code Status  DNAR/DNI    Disposition  The patient is not medically cleared for discharge to home or a post-acute facility.  Anticipate discharge to: home with close outpatient follow-up    I have placed the appropriate orders for post-discharge needs.    Review of Systems  Review of Systems   Constitutional:  Positive for malaise/fatigue. Negative for chills, diaphoresis and fever.   HENT:  Negative for congestion, hearing loss and sore throat.    Eyes:  Negative for blurred vision.   Respiratory:  Negative for cough, shortness of breath and wheezing.    Cardiovascular:  Negative for chest pain, palpitations and leg swelling.   Gastrointestinal:  Positive for abdominal pain and nausea. Negative for diarrhea, heartburn and vomiting.   Genitourinary:  Negative for dysuria, flank pain and hematuria.   Musculoskeletal:  Negative for back pain, joint pain, myalgias and neck pain.   Skin:  Negative for rash.   Neurological:  Positive for weakness and headaches. Negative for dizziness, sensory change, speech change and focal weakness.   Psychiatric/Behavioral:  The patient is nervous/anxious.         Physical Exam  Temp:  [36 °C (96.8 °F)-36.9 °C (98.4 °F)] 36.4 °C (97.6 °F)  Pulse:  [71-92] 92  Resp:   [12-17] 17  BP: (107-189)/(53-93) 126/73  SpO2:  [93 %-96 %] 94 %    Physical Exam  Vitals and nursing note reviewed.   HENT:      Head: Normocephalic and atraumatic.      Nose: No congestion.      Mouth/Throat:      Mouth: Mucous membranes are dry.   Eyes:      Extraocular Movements: Extraocular movements intact.      Conjunctiva/sclera: Conjunctivae normal.   Cardiovascular:      Rate and Rhythm: Normal rate and regular rhythm.   Pulmonary:      Effort: Pulmonary effort is normal.      Breath sounds: Normal breath sounds.   Abdominal:      General: There is no distension.      Tenderness: There is abdominal tenderness. There is no guarding or rebound.   Musculoskeletal:      Cervical back: No tenderness.      Right lower leg: No edema.      Left lower leg: No edema.   Skin:     General: Skin is warm and dry.   Neurological:      General: No focal deficit present.      Mental Status: She is alert and oriented to person, place, and time.      Cranial Nerves: No cranial nerve deficit.   Psychiatric:         Mood and Affect: Mood is anxious.         Fluids  No intake or output data in the 24 hours ending 10/25/23 1228    Laboratory  Recent Labs     10/24/23  1017 10/24/23  1921 10/25/23  0627   WBC 9.4 10.1 7.6   RBC 4.97 4.42 4.19*   HEMOGLOBIN 14.0 12.2 11.5*   HEMATOCRIT 42.6 38.7 36.8*   MCV 85.7 87.6 87.8   MCH 28.2 27.6 27.4   MCHC 32.9 31.5* 31.3*   RDW 44.6 47.0 47.3   PLATELETCT 307 306 253   MPV 11.2 10.5 10.6     Recent Labs     10/24/23  1017 10/25/23  0627   SODIUM 135 139   POTASSIUM 3.6 3.9   CHLORIDE 99 107   CO2 19* 22   GLUCOSE 143* 97   BUN 16 14   CREATININE 0.97 0.94   CALCIUM 10.6* 8.2*                   Imaging  CT-ABDOMEN-PELVIS WITH   Final Result      1.  Multiple ill-defined low-density liver lesions with expansion of the LEFT lobe, most likely metastases.   2.  Multiple small bibasilar pulmonary nodules concerning for metastasis as well.   3.  No focal mesenteric inflammatory process.   4.   Bilateral adnexal cystic lesions, larger on the RIGHT, serous inclusion cyst versus neoplasm.   5.  L2 lytic lesion concerning for metastasis.            CT-HEAD W/O   Final Result      1.  Mild atrophy and white matter changes.   2.  No acute intracranial hemorrhage or territorial infarct.   3.  Sphenoid sinusitis.   4.  Apparent postoperative change of RIGHT ocular globe with multiple metallic densities present.         DX-CHEST-PORTABLE (1 VIEW)   Final Result      No acute cardiopulmonary disease evident.      MR-ABDOMEN-WITH & W/O    (Results Pending)   MR-BRAIN-WITH & W/O    (Results Pending)        Assessment/Plan  GI bleed- (present on admission)  Assessment & Plan  IV Protonix  Plan for EGD and Colonoscopy tomorrow  For MRI abdomen       Metastatic melanoma (HCC)- (present on admission)  Assessment & Plan  Metastatic to liver, bone, eye, possibly lung  Oncology aware of admission    Lactic acid acidosis- (present on admission)  Assessment & Plan  IVF hydration    Seizure (HCC)- (present on admission)  Assessment & Plan  Neurochecks  Check EEG, MRI brain  Hold Keppra for now  Seizure precautions  IV Ativan as needed        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 In review of yesterday's note (10/24/2023), there are no changes except as documented above.

## 2023-10-25 NOTE — CARE PLAN
The patient is Stable - Low risk of patient condition declining or worsening    Shift Goals  Clinical Goals: Neuro status will not decline. Patient will have MRI  Patient Goals: Pain control  Family Goals: Safety, discuss changes in mentation    Progress made toward(s) clinical / shift goals:  Neuro status remains unchanged this shift. MRI pending occular implant clarification. RN contacted [facility] for information.       Problem: Pain - Standard  Goal: Alleviation of pain or a reduction in pain to the patient’s comfort goal  Outcome: Progressing  Note: Patient responded well to PRN  pain medication for headache. Initially 6/10 and reduced to 3/10.      Problem: Knowledge Deficit - Standard  Goal: Patient and family/care givers will demonstrate understanding of plan of care, disease process/condition, diagnostic tests and medications  Outcome: Progressing  Note: Discussed POC with patient. Patient oriented to 4 and able to provide accurate medical history. Worked with patient to obtain necessary information regarding implants in R eye for MRI purposes.      Problem: Fall Risk  Goal: Patient will remain free from falls  Outcome: Progressing  Note: Fall precautions in place. Patient with steady, shuffling gait. Up SBA for visual deficits. No injury or falls this shift.        Patient is not progressing towards the following goals: NA

## 2023-10-26 PROBLEM — R03.0 ELEVATED BP WITHOUT DIAGNOSIS OF HYPERTENSION: Status: ACTIVE | Noted: 2023-01-01

## 2023-10-26 NOTE — PROGRESS NOTES
Brief GI update:    Patient did not have clear stools after 3/4 of her bowel prep.    Rescheduled EGD/colonoscopy for tomorrow.    Ordered 1 additional dose of GoLytely now.  May have clears today  NPO at midnight    Please notify GI team as soon as possible if patient unable to complete bowel prep.    .NIKKO Ricardo.

## 2023-10-26 NOTE — PROGRESS NOTES
Follow Up Note:  Hematology/Oncology      Primary Care:  Oly Wang P.A.-C.    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  woman who presents to the clinic for evaluation for a new diagnosis of suspected uveal melanoma of the R eye. She first noticed symptoms in the past month, when she was in [place] with her daughters. She thought her glasses were dirty and kept trying to clean them, but her vision worsened in the R eye. In early May, she went to see an ophthalmologist and on exam was found to have a mass in the right eye, 25 mm x 17 mm x 12 mm, with associated subretinal fluid. She was referred to [facility] for ocular oncology evaluation, and to her primary physician for staging work up. She has subsequently been referred to me.     Treatment History:   07/18/22: Start XRT 5600 cGy in 4 daily fractions (done at [facility])    Interval History:  Patient seen and examined at bedside. She was lost to follow up after not returning calls for scheduling surveillance visits last year. She was admitted to the hospital after having a seizure and having significant pain. She had scans done which revealed widespread metastatic disease in the liver and lungs, as well as lytic lesions in the L2 area. She understands that her disease has spread drastically and is awaiting a brain MRI for next steps. She otherwise is doing okay.     Allergies as of 10/24/2023 - Reviewed 10/24/2023   Allergen Reaction Noted    Keflex Unspecified 05/09/2018    Sulfa drugs Hives 12/04/2017         Current Facility-Administered Medications:     senna-docusate (Pericolace Or Senokot S) 8.6-50 MG per tablet 1 Tablet, 1 Tablet, Oral, Once, Armando R Reyes Yparraguirre, M.D.    pantoprazole (Protonix) injection 40 mg, 40 mg, Intravenous, BID,  Carrington Boyle M.D.    polyethylene glycol-electrolytes (Golytely) solution 4 L, 4 L, Oral, Once, Guadalupe Castelan M.D.    lactated ringers infusion, , Intravenous, Continuous, Carrington Boyle M.D.    acetaminophen (Tylenol) tablet 500 mg, 500 mg, Oral, Q6HRS PRN, Carrington Boyel M.D.    LORazepam (Ativan) injection 1 mg, 1 mg, Intravenous, Q2HRS PRN, Carrington Boyle M.D.    oxyCODONE immediate-release (Roxicodone) tablet 2.5 mg, 2.5 mg, Oral, Q3HRS PRN, 2.5 mg at 10/26/23 1308 **OR** oxyCODONE immediate-release (Roxicodone) tablet 5 mg, 5 mg, Oral, Q3HRS PRN, 5 mg at 10/26/23 0816 **OR** HYDROmorphone (Dilaudid) injection 0.5 mg, 0.5 mg, Intravenous, Q3HRS PRN, ALEX BernardPNoemiRNoemiNNoemi    labetalol (Normodyne/Trandate) injection 10 mg, 10 mg, Intravenous, Q4HRS PRN, Chidi Swanson M.D., 10 mg at 10/24/23 1317    ondansetron (Zofran) syringe/vial injection 4 mg, 4 mg, Intravenous, Q4HRS PRN, Chidi Swanson M.D.    ondansetron (Zofran ODT) dispertab 4 mg, 4 mg, Oral, Q4HRS PRN, Chidi Swanson M.D.    Pharmacy Consult Request ...Pain Management Review 1 Each, 1 Each, Other, PHARMACY TO DOSE, Chidi Swanson M.D.      Review of Systems:  Review of Systems   Constitutional:  Positive for malaise/fatigue. Negative for chills, diaphoresis, fever and weight loss.   HENT:  Negative for hearing loss, nosebleeds, sinus pain and sore throat.    Eyes:  Negative for blurred vision and double vision.   Respiratory:  Negative for cough, sputum production, shortness of breath and wheezing.    Cardiovascular:  Negative for chest pain, palpitations, orthopnea and leg swelling.   Gastrointestinal:  Negative for abdominal pain, blood in stool, constipation, diarrhea, heartburn, melena, nausea and vomiting.   Genitourinary:  Negative for dysuria, frequency, hematuria and urgency.   Musculoskeletal:  Positive for back pain. Negative for joint pain and myalgias.   Skin:  Negative for rash.   Neurological:  Positive for seizures.  Negative for dizziness, tingling, weakness and headaches.   Endo/Heme/Allergies:  Does not bruise/bleed easily.   Psychiatric/Behavioral:  The patient is not nervous/anxious and does not have insomnia.          Physical Exam:  Vitals:    10/25/23 1958 10/26/23 0118 10/26/23 0406 10/26/23 0707   BP: (!) 154/73 (!) 148/60 (!) 154/73 (!) 142/92   Pulse: 76 78 82 92   Resp: 18 18 18 17   Temp: 37 °C (98.6 °F) 36.3 °C (97.3 °F) 36.7 °C (98 °F) 37 °C (98.6 °F)   TempSrc: Temporal Temporal Temporal Temporal   SpO2: 95% 96% 92% 96%   Weight:       Height:           DESC; KARNOFSKY SCALE WITH ECOG EQUIVALENT: 80, Normal activity with effort; some signs or symptoms of disease (ECOG equivalent 1)    DISTRESS LEVEL: no apparent distress    Physical Exam  Vitals and nursing note reviewed.   Constitutional:       General: She is awake. She is not in acute distress.     Appearance: Normal appearance. She is normal weight. She is not ill-appearing, toxic-appearing or diaphoretic.   HENT:      Head: Normocephalic and atraumatic.      Nose: Nose normal. No congestion.      Mouth/Throat:      Pharynx: Oropharynx is clear. No oropharyngeal exudate or posterior oropharyngeal erythema.   Eyes:      General: No scleral icterus.     Extraocular Movements: Extraocular movements intact.      Conjunctiva/sclera: Conjunctivae normal.      Pupils: Pupils are equal, round, and reactive to light.   Cardiovascular:      Rate and Rhythm: Normal rate and regular rhythm.      Pulses: Normal pulses.      Heart sounds: Normal heart sounds. No murmur heard.     No friction rub. No gallop.   Pulmonary:      Effort: Pulmonary effort is normal.      Breath sounds: Normal breath sounds. No decreased air movement. No wheezing, rhonchi or rales.   Abdominal:      General: Bowel sounds are normal. There is no distension.      Tenderness: There is no abdominal tenderness.   Musculoskeletal:         General: No deformity. Normal range of motion.      Cervical  back: Normal range of motion and neck supple. No tenderness.      Right lower leg: No edema.      Left lower leg: No edema.   Lymphadenopathy:      Cervical: No cervical adenopathy.      Upper Body:      Right upper body: No axillary adenopathy.      Left upper body: No axillary adenopathy.      Lower Body: No right inguinal adenopathy. No left inguinal adenopathy.   Skin:     General: Skin is warm and dry.      Coloration: Skin is not jaundiced.      Findings: No erythema or rash.   Neurological:      General: No focal deficit present.      Mental Status: She is alert and oriented to person, place, and time.      Sensory: Sensation is intact.      Motor: Motor function is intact. No weakness.      Gait: Gait is intact.   Psychiatric:         Attention and Perception: Attention normal.         Mood and Affect: Mood normal.         Behavior: Behavior normal. Behavior is cooperative.         Thought Content: Thought content normal.         Judgment: Judgment normal.           Labs:  Admission on 10/24/2023   Component Date Value Ref Range Status    WBC 10/24/2023 9.4  4.8 - 10.8 K/uL Final    RBC 10/24/2023 4.97  4.20 - 5.40 M/uL Final    Hemoglobin 10/24/2023 14.0  12.0 - 16.0 g/dL Final    Hematocrit 10/24/2023 42.6  37.0 - 47.0 % Final    MCV 10/24/2023 85.7  81.4 - 97.8 fL Final    MCH 10/24/2023 28.2  27.0 - 33.0 pg Final    MCHC 10/24/2023 32.9  32.2 - 35.5 g/dL Final    Please note new reference range effective 05/22/2023.    RDW 10/24/2023 44.6  35.9 - 50.0 fL Final    Platelet Count 10/24/2023 307  164 - 446 K/uL Final    MPV 10/24/2023 11.2  9.0 - 12.9 fL Final    Neutrophils-Polys 10/24/2023 77.00 (H)  44.00 - 72.00 % Final    Lymphocytes 10/24/2023 15.00 (L)  22.00 - 41.00 % Final    Monocytes 10/24/2023 6.90  0.00 - 13.40 % Final    Eosinophils 10/24/2023 0.10  0.00 - 6.90 % Final    Basophils 10/24/2023 0.60  0.00 - 1.80 % Final    Immature Granulocytes 10/24/2023 0.40  0.00 - 0.90 % Final    Nucleated  RBC 10/24/2023 0.00  0.00 - 0.20 /100 WBC Final    Please note new reference range effective 05/22/2023.    Neutrophils (Absolute) 10/24/2023 7.25  1.82 - 7.42 K/uL Final    Comment: Includes immature neutrophils, if present.  Please note new reference range effective 05/22/2023.      Lymphs (Absolute) 10/24/2023 1.41  1.00 - 4.80 K/uL Final    Monos (Absolute) 10/24/2023 0.65  0.00 - 0.85 K/uL Final    Eos (Absolute) 10/24/2023 0.01  0.00 - 0.51 K/uL Final    Baso (Absolute) 10/24/2023 0.06  0.00 - 0.12 K/uL Final    Immature Granulocytes (abs) 10/24/2023 0.04  0.00 - 0.11 K/uL Final    NRBC (Absolute) 10/24/2023 0.00  K/uL Final    Sodium 10/24/2023 135  135 - 145 mmol/L Final    Potassium 10/24/2023 3.6  3.6 - 5.5 mmol/L Final    Chloride 10/24/2023 99  96 - 112 mmol/L Final    Co2 10/24/2023 19 (L)  20 - 33 mmol/L Final    Anion Gap 10/24/2023 17.0 (H)  7.0 - 16.0 Final    Glucose 10/24/2023 143 (H)  65 - 99 mg/dL Final    Bun 10/24/2023 16  8 - 22 mg/dL Final    Creatinine 10/24/2023 0.97  0.50 - 1.40 mg/dL Final    Calcium 10/24/2023 10.6 (H)  8.5 - 10.5 mg/dL Final    Correct Calcium 10/24/2023 11.0 (H)  8.5 - 10.5 mg/dL Final    AST(SGOT) 10/24/2023 106 (H)  12 - 45 U/L Final    ALT(SGPT) 10/24/2023 58 (H)  2 - 50 U/L Final    Alkaline Phosphatase 10/24/2023 279 (H)  30 - 99 U/L Final    Total Bilirubin 10/24/2023 0.3  0.1 - 1.5 mg/dL Final    Albumin 10/24/2023 3.5  3.2 - 4.9 g/dL Final    Total Protein 10/24/2023 6.9  6.0 - 8.2 g/dL Final    Globulin 10/24/2023 3.4  1.9 - 3.5 g/dL Final    A-G Ratio 10/24/2023 1.0  g/dL Final    Diagnostic Alcohol 10/24/2023 <10.1  <10.1 mg/dL Final    Comment: For diagnostic purposes, results should always be assessed  in conjunction with the patient's medical history, clinical  examination, and other findings. This test is for medical  purposes only.      Amphetamines Urine 10/24/2023 Negative  Negative Final    Barbiturates 10/24/2023 Negative  Negative Final     Benzodiazepines 10/24/2023 Negative  Negative Final    Cocaine Metabolite 10/24/2023 Negative  Negative Final    Fentanyl, Urine 10/24/2023 Negative  Negative Final    Methadone 10/24/2023 Negative  Negative Final    Opiates 10/24/2023 Negative  Negative Final    Oxycodone 10/24/2023 Negative  Negative Final    Phencyclidine -Pcp 10/24/2023 Negative  Negative Final    Propoxyphene 10/24/2023 Negative  Negative Final    Cannabinoid Metab 10/24/2023 Negative  Negative Final    Comment: The above compounds were screened at the following thresholds:    Amphetamines               1000 ng/mL  Barbiturates                200 ng/mL  Benzodiazepines             200 ng/mL  Cocaine (Benzoylecgonine)   300 ng/mL  Fentanyl                      5 ng/mL  Methadone                   300 ng/mL  Opiates (Morphine)          300 ng/mL  Oxycodone                   100 ng/mL  Phencyclidine                25 ng/mL  Propoxyphene                300 ng/mL  THC (Tetrahydrocannabinol)   50 ng/mL    This assay provides a preliminary unconfirmed analytical test  result that may be suitable for the clinical management of  patients in certain situations. A more specific alternative  chemical method must be used to obtain a confirmed analytical  result. Gas chromatography/mass spectrometry (GC/MS) is the  preferred confirmatory method. Clinical consideration and  professional judgment should be applied to any drug-of-abuse  test result, particularly when preliminary positive results  are used.        Color 10/24/2023 DK Yellow   Final    Character 10/24/2023 Clear   Final    Specific Gravity 10/24/2023 1.029  <1.035 Final    Ph 10/24/2023 5.0  5.0 - 8.0 Final    Glucose 10/24/2023 Negative  Negative mg/dL Final    Ketones 10/24/2023 80 (A)  Negative mg/dL Final    Protein 10/24/2023 100 (A)  Negative mg/dL Final    Bilirubin 10/24/2023 Negative  Negative Final    Urobilinogen, Urine 10/24/2023 0.2  Negative Final    Nitrite 10/24/2023 Negative   Negative Final    Leukocyte Esterase 10/24/2023 Negative  Negative Final    Occult Blood 10/24/2023 Negative  Negative Final    Micro Urine Req 10/24/2023 Microscopic   Final    Significant Indicator 10/24/2023 NEG   Final    Source 10/24/2023 UR   Final    Site 10/24/2023 -   Final    Culture Result 10/24/2023 No growth at 48 hours.   Final    Lactic Acid 10/24/2023 3.0 (H)  0.5 - 2.0 mmol/L Final    Influenza virus A RNA 10/24/2023 Negative  Negative Final    Influenza virus B, PCR 10/24/2023 Negative  Negative Final    RSV, PCR 10/24/2023 Negative  Negative Final    SARS-CoV-2 by PCR 10/24/2023 NotDetected   Final    Comment: [facility] providers: PLEASE REFER TO DE-ESCALATION AND RETESTING PROTOCOL  on [email or web address]    **The [employer] GeneXpert Xpress SARS-CoV-2 RT-PCR Test has been made  available for use under the Emergency Use Authorization (EUA) only.      SARS-CoV-2 Source 10/24/2023 NP Swab   Final    Procalcitonin 10/24/2023 0.31 (H)  <0.25 ng/mL Final    Comment: Initiation of empiric therapy (only if patient does not meet  CMS sepsis inclusion criteria):  LOWER RESPIRATORY TRACT INFECTION  ---------------------------------  <=0.25 ng/mL   Unlikely to be bacterial; antibiotics are  discouraged (may repeat in 6 hours if antibiotics  are withheld)    >0.25  ng/mL   Likely to be bacterial; antibiotics encouraged    De-escalation/Discontinuation of therapy:  LOWER RESPIRATORY TRACT INFECTION  ---------------------------------  <=0.25 ng/mL   Cessation of antibiotics is encouraged unless  patient is clinically unstable    Decrease from baseline by >=80%   Cessation of antibiotics is  encouraged unless patient is clinically unstable    Increasing or not decreasing from baseline AND >0.50 ng/mL  Consider possible treatment failure; expansion of  antibiotic coverage is encouraged    SEPSIS  ------  <=0.50 ng/mL   Cessation of antibiotics is encouraged unless  patient is clinically unstable    Decrease from baseline  by >=80%   Cessation of antibiotics i                           s  encouraged unless patient is clinically unstable    Increasing or not decreasing from baseline AND >0.50 ng/mL  Consider possible treatment failure; expansion of  antibiotic coverage is encouraged        Lipase 10/24/2023 34  11 - 82 U/L Final    WBC 10/24/2023 2-5  /hpf Final    Comment: Female  <12 Yr 0-2  >12 Yr 0-5  Male   None      RBC 10/24/2023 0-2  /hpf Final    Comment: Female  >12 Yr 0-2  Male   None      Bacteria 10/24/2023 Negative  None /hpf Final    Epithelial Cells 10/24/2023 Few  /hpf Final    Epithelial Cells Renal 10/24/2023 Few  /hpf Final    Hyaline Cast 10/24/2023 6-10 (A)  /lpf Final    GFR (CKD-EPI) 10/24/2023 63  >60 mL/min/1.73 m 2 Final    Comment: Estimated Glomerular Filtration Rate is calculated using  race neutral CKD-EPI 2021 equation per NKF-ASN recommendations.      Lactic Acid 10/24/2023 1.3  0.5 - 2.0 mmol/L Final    Lactic Acid 10/25/2023 1.5  0.5 - 2.0 mmol/L Final    WBC 10/24/2023 10.1  4.8 - 10.8 K/uL Final    RBC 10/24/2023 4.42  4.20 - 5.40 M/uL Final    Hemoglobin 10/24/2023 12.2  12.0 - 16.0 g/dL Final    Hematocrit 10/24/2023 38.7  37.0 - 47.0 % Final    MCV 10/24/2023 87.6  81.4 - 97.8 fL Final    MCH 10/24/2023 27.6  27.0 - 33.0 pg Final    MCHC 10/24/2023 31.5 (L)  32.2 - 35.5 g/dL Final    Please note new reference range effective 05/22/2023.    RDW 10/24/2023 47.0  35.9 - 50.0 fL Final    Platelet Count 10/24/2023 306  164 - 446 K/uL Final    MPV 10/24/2023 10.5  9.0 - 12.9 fL Final    Neutrophils-Polys 10/24/2023 71.90  44.00 - 72.00 % Final    Lymphocytes 10/24/2023 17.20 (L)  22.00 - 41.00 % Final    Monocytes 10/24/2023 9.90  0.00 - 13.40 % Final    Eosinophils 10/24/2023 0.20  0.00 - 6.90 % Final    Basophils 10/24/2023 0.50  0.00 - 1.80 % Final    Immature Granulocytes 10/24/2023 0.30  0.00 - 0.90 % Final    Nucleated RBC 10/24/2023 0.00  0.00 - 0.20 /100 WBC Final    Please note new reference  range effective 05/22/2023.    Neutrophils (Absolute) 10/24/2023 7.28  1.82 - 7.42 K/uL Final    Comment: Includes immature neutrophils, if present.  Please note new reference range effective 05/22/2023.      Lymphs (Absolute) 10/24/2023 1.74  1.00 - 4.80 K/uL Final    Monos (Absolute) 10/24/2023 1.00 (H)  0.00 - 0.85 K/uL Final    Eos (Absolute) 10/24/2023 0.02  0.00 - 0.51 K/uL Final    Baso (Absolute) 10/24/2023 0.05  0.00 - 0.12 K/uL Final    Immature Granulocytes (abs) 10/24/2023 0.03  0.00 - 0.11 K/uL Final    NRBC (Absolute) 10/24/2023 0.00  K/uL Final    Sodium 10/25/2023 139  135 - 145 mmol/L Final    Potassium 10/25/2023 3.9  3.6 - 5.5 mmol/L Final    Chloride 10/25/2023 107  96 - 112 mmol/L Final    Co2 10/25/2023 22  20 - 33 mmol/L Final    Glucose 10/25/2023 97  65 - 99 mg/dL Final    Bun 10/25/2023 14  8 - 22 mg/dL Final    Creatinine 10/25/2023 0.94  0.50 - 1.40 mg/dL Final    Calcium 10/25/2023 8.2 (L)  8.5 - 10.5 mg/dL Final    Anion Gap 10/25/2023 10.0  7.0 - 16.0 Final    WBC 10/25/2023 7.6  4.8 - 10.8 K/uL Final    RBC 10/25/2023 4.19 (L)  4.20 - 5.40 M/uL Final    Hemoglobin 10/25/2023 11.5 (L)  12.0 - 16.0 g/dL Final    Hematocrit 10/25/2023 36.8 (L)  37.0 - 47.0 % Final    MCV 10/25/2023 87.8  81.4 - 97.8 fL Final    MCH 10/25/2023 27.4  27.0 - 33.0 pg Final    MCHC 10/25/2023 31.3 (L)  32.2 - 35.5 g/dL Final    Please note new reference range effective 05/22/2023.    RDW 10/25/2023 47.3  35.9 - 50.0 fL Final    Platelet Count 10/25/2023 253  164 - 446 K/uL Final    MPV 10/25/2023 10.6  9.0 - 12.9 fL Final    Neutrophils-Polys 10/25/2023 64.30  44.00 - 72.00 % Final    Lymphocytes 10/25/2023 23.30  22.00 - 41.00 % Final    Monocytes 10/25/2023 10.80  0.00 - 13.40 % Final    Eosinophils 10/25/2023 0.70  0.00 - 6.90 % Final    Basophils 10/25/2023 0.50  0.00 - 1.80 % Final    Immature Granulocytes 10/25/2023 0.40  0.00 - 0.90 % Final    Nucleated RBC 10/25/2023 0.00  0.00 - 0.20 /100 WBC Final     Please note new reference range effective 05/22/2023.    Neutrophils (Absolute) 10/25/2023 4.88  1.82 - 7.42 K/uL Final    Comment: Includes immature neutrophils, if present.  Please note new reference range effective 05/22/2023.      Lymphs (Absolute) 10/25/2023 1.77  1.00 - 4.80 K/uL Final    Monos (Absolute) 10/25/2023 0.82  0.00 - 0.85 K/uL Final    Eos (Absolute) 10/25/2023 0.05  0.00 - 0.51 K/uL Final    Baso (Absolute) 10/25/2023 0.04  0.00 - 0.12 K/uL Final    Immature Granulocytes (abs) 10/25/2023 0.03  0.00 - 0.11 K/uL Final    NRBC (Absolute) 10/25/2023 0.00  K/uL Final    Lactic Acid 10/25/2023 1.2  0.5 - 2.0 mmol/L Final    GFR (CKD-EPI) 10/25/2023 65  >60 mL/min/1.73 m 2 Final    Comment: Estimated Glomerular Filtration Rate is calculated using  race neutral CKD-EPI 2021 equation per NKF-ASN recommendations.      WBC 10/26/2023 9.6  4.8 - 10.8 K/uL Final    RBC 10/26/2023 4.64  4.20 - 5.40 M/uL Final    Hemoglobin 10/26/2023 12.8  12.0 - 16.0 g/dL Final    Hematocrit 10/26/2023 41.4  37.0 - 47.0 % Final    MCV 10/26/2023 89.2  81.4 - 97.8 fL Final    MCH 10/26/2023 27.6  27.0 - 33.0 pg Final    MCHC 10/26/2023 30.9 (L)  32.2 - 35.5 g/dL Final    Please note new reference range effective 05/22/2023.    RDW 10/26/2023 47.8  35.9 - 50.0 fL Final    Platelet Count 10/26/2023 308  164 - 446 K/uL Final    MPV 10/26/2023 10.3  9.0 - 12.9 fL Final    Sodium 10/26/2023 136  135 - 145 mmol/L Final    Potassium 10/26/2023 3.8  3.6 - 5.5 mmol/L Final    Chloride 10/26/2023 102  96 - 112 mmol/L Final    Co2 10/26/2023 23  20 - 33 mmol/L Final    Anion Gap 10/26/2023 11.0  7.0 - 16.0 Final    Glucose 10/26/2023 112 (H)  65 - 99 mg/dL Final    Bun 10/26/2023 10  8 - 22 mg/dL Final    Creatinine 10/26/2023 1.00  0.50 - 1.40 mg/dL Final    Calcium 10/26/2023 8.5  8.5 - 10.5 mg/dL Final    Correct Calcium 10/26/2023 8.8  8.5 - 10.5 mg/dL Final    AST(SGOT) 10/26/2023 202 (H)  12 - 45 U/L Final    ALT(SGPT) 10/26/2023  81 (H)  2 - 50 U/L Final    Alkaline Phosphatase 10/26/2023 249 (H)  30 - 99 U/L Final    Total Bilirubin 10/26/2023 0.4  0.1 - 1.5 mg/dL Final    Albumin 10/26/2023 3.6  3.2 - 4.9 g/dL Final    Total Protein 10/26/2023 6.9  6.0 - 8.2 g/dL Final    Globulin 10/26/2023 3.3  1.9 - 3.5 g/dL Final    A-G Ratio 10/26/2023 1.1  g/dL Final    TSH 10/26/2023 2.060  0.380 - 5.330 uIU/mL Final    Comment: The 2011 American Thyroid Association (SANDEEP) guidelines  recommended that the interpretation of thyroid function in  pregnancy be based on trimester specific reference ranges.    1st Trimester  0.100-2.500 mIU/L  2nd Trimester  0.200-3.000 mIU/L  3rd Trimester  0.300-3.500 mIU/L    These established reference ranges have not been validated  at [employer] [employer].      GFR (CKD-EPI) 10/26/2023 60  >60 mL/min/1.73 m 2 Final    Comment: Estimated Glomerular Filtration Rate is calculated using  race neutral CKD-EPI 2021 equation per NKF-ASN recommendations.         Imaging:     All listed images below have been independently reviewed by me. I agree with the findings as summarized below:    CT-ABDOMEN-PELVIS WITH    Result Date: 10/24/2023  10/24/2023 10:18 AM HISTORY/REASON FOR EXAM:  epigastric abdominal pain. TECHNIQUE/EXAM DESCRIPTION:   CT scan of the abdomen and pelvis with contrast. Contrast-enhanced helical scanning was obtained from the diaphragmatic domes through the pubic symphysis following the bolus administration of nonionic contrast without complication. 100 mL of Omnipaque 350 nonionic contrast was administered without complication. Low dose optimization technique was utilized for this CT exam including automated exposure control and adjustment of the mA and/or kV according to patient size. COMPARISON: No prior studies available. FINDINGS: Lower Chest: Cluster of small nodules in the LEFT lung base measuring up to 5 mm.  RIGHT middle lobe nodule measuring 6 mm. Bilateral lower lobe nodules measuring up to 5  mm. Liver: Heterogeneous liver with multiple low-density lesions, with expansion of the LEFT lobe, most likely metastases. Spleen: Unremarkable. Pancreas: Unremarkable. Gallbladder: No calcified stones. Biliary: Nondilated. Adrenal glands: Normal. Kidneys: Small RIGHT kidney cyst which no further evaluation is necessary.  LEFT kidney is unremarkable. Bowel: No evidence of bowel obstruction.  Normal appendix. Lymph nodes: No adenopathy. Vasculature: Moderate atherosclerotic calcification of the common aorta with bulky calcification of the bifurcation. Peritoneum: No peritoneal fluid or pneumoperitoneum. Musculoskeletal: Lumbar spine degenerative changes.  Lytic lesion within L2 vertebral body. Pelvis: Bladder is decompressed.  Uterus is grossly unremarkable.  LEFT adnexal cystic structure measuring 18 mm.  RIGHT adnexal cystic structure measuring 34 mm.     1.  Multiple ill-defined low-density liver lesions with expansion of the LEFT lobe, most likely metastases. 2.  Multiple small bibasilar pulmonary nodules concerning for metastasis as well. 3.  No focal mesenteric inflammatory process. 4.  Bilateral adnexal cystic lesions, larger on the RIGHT, serous inclusion cyst versus neoplasm. 5.  L2 lytic lesion concerning for metastasis.     CT-HEAD W/O    Result Date: 10/24/2023  10/24/2023 10:18 AM HISTORY/REASON FOR EXAM:  Seizure. Found down. TECHNIQUE/EXAM DESCRIPTION AND NUMBER OF VIEWS: CT of the head without contrast. The study was performed on a helical multidetector CT scanner. Contiguous 2.5 mm axial sections were obtained from the skull base through the vertex. Up to date radiation dose reduction adjustments have been utilized to meet ALARA standards for radiation dose reduction. COMPARISON:  None available FINDINGS: Lateral ventricles are normal in size and symmetric. Cortical sulci are mildly prominent. Patchy areas of low attenuation in the white matter bilaterally. No significant mass effect or midline  shift. Basal cisterns are patent. No evidence for intracranial hemorrhage. Calvaria are intact. Multiple metallic densities in the RIGHT orbit consistent with prior ocular surgery. Visualized mastoid air cells are clear. Sphenoid sinus mucosal thickening and bubbly secretions.     1.  Mild atrophy and white matter changes. 2.  No acute intracranial hemorrhage or territorial infarct. 3.  Sphenoid sinusitis. 4.  Apparent postoperative change of RIGHT ocular globe with multiple metallic densities present.     DX-CHEST-PORTABLE (1 VIEW)    Result Date: 10/24/2023  10/24/2023 10:09 AM HISTORY/REASON FOR EXAM:  Found unresponsive.. TECHNIQUE/EXAM DESCRIPTION AND NUMBER OF VIEWS: Single portable view of the chest. COMPARISON: None FINDINGS: The soft tissues and bony structures are unremarkable. The heart and mediastinal structures are within normal limits. Pulmonary vascularity is normal. The lung fields are clear. There is no effusion or pneumothorax.     No acute cardiopulmonary disease evident.      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 Tumor was large, so measurements were difficult. Tumor measured as 20 mm transverse by 21 mm longitudinal with thickness 11.9 mm. Posterior edge overhangs the posterior pole and seems to bisect the fovea and nerve. The ciliary body was involved for the clock hours of the tumor superiorly, the interior markers should delineate the clock hours of the tumor and ciliary body to include.    Assessment & Plan:  1. Epigastric pain        2. Metastatic malignant neoplasm, unspecified site (HCC)        3. 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 She was unfortunately lost to follow up until she presented to the hospital with seizures.     Current Diagnosis and Staging: Metastatic ciliochoroidal  melanoma, cTxNxM1 stage IV disease    Update: Patient has metastatic ciliochoroidal melanoma. She will need an MRI to assess for metastatic disease to the brain (likely, given her presentation), and will need palliative XRT accordingly. She will then be evaluated for therapy with Tebentafusp, which had updated 3 year efficacy and safety data presented last week at [facility]. The patient is interested in therapy at this time, but this will be discussed once she is otherwise stabilized.     Treatment Plan: Potential for Tebentafusp after likely SBRT for brain mets    Treatment Citation: [id number] trial, EMEKA Corley et al. [facility] 2023    Plan of Care:    Primary Therapy: Consideration for Tebentafusp if patient elects to move forward with palliative systemic therapy  Supportive Therapy: Will need SBRT if brain mets confirmed on MRI  Toxicity: Patient is getting antineoplastic therapy and needs monitoring of blood counts, hepatic function, and renal function due to potential for organ dysfunction.   Labs: CBC with diff, CMP monitoring  Imaging: MRI brain pending. Will need outpatient PET.   Treatment Planning: Patient has metastatic disease, and would be a candidate for Tebentafusp (Kimmtrak) if she elects to move forward with systemic therapy.   Consultations: Neurology, likely will need radiation oncology  Code Status: Full  Miscellaneous: NA  Return for Follow Up: Monitor in hospital, follow up outpatient    Any questions and concerns raised by the patient were answered to the best of my ability. Thank you for allowing me to participate in the care for this patient. Please feel free to contact me for any questions or concerns.       Total time spent on chart review, clinic encounter, and documentation: 64 minutes.

## 2023-10-26 NOTE — PROGRESS NOTES
Patient unable to tolerate MRI 2/2 increased abd pain.,PRN Oxy IR and PRN IV Dilaudid both ineffective. MD made aware

## 2023-10-26 NOTE — PROGRESS NOTES
Hospital Medicine Daily Progress Note    Date of Service  10/26/2023    Chief Complaint  Malaika Garcia is a 68 y.o. female admitted 10/24/2023 with Seizure    Hospital Course  Admitted with possible seizure.  Also with complaints of dark stools for the past 3 days.  Patient with known history of metastatic melanoma to liver, bone, eye, and possibly to lung.  She was started on IV Protonix.  Gastroenterology was consulted on the case    Interval Problem Update  Seizure - no episodes, EEG reviewed  GIB - plan for EGD and Colosnoscopy    Updates given and plan of care discussed with patient's spouse who was at bedside.    I have discussed this patient's plan of care and discharge plan at IDT rounds today with Case Management, Nursing, Nursing leadership, and other members of the IDT team.    Consultants/Specialty  GI    Code Status  DNAR/DNI    Disposition  The patient is not medically cleared for discharge to home or a post-acute facility.  Anticipate discharge to: home with close outpatient follow-up    I have placed the appropriate orders for post-discharge needs.    Review of Systems  Review of Systems   Constitutional:  Positive for malaise/fatigue. Negative for chills, diaphoresis and fever.   HENT:  Negative for congestion, hearing loss and sore throat.    Eyes:  Negative for blurred vision.   Respiratory:  Negative for cough, shortness of breath and wheezing.    Cardiovascular:  Negative for chest pain, palpitations and leg swelling.   Gastrointestinal:  Positive for abdominal pain and nausea. Negative for diarrhea, heartburn and vomiting.   Genitourinary:  Negative for dysuria, flank pain and hematuria.   Musculoskeletal:  Negative for back pain, joint pain, myalgias and neck pain.   Skin:  Negative for rash.   Neurological:  Positive for weakness and headaches. Negative for dizziness, sensory change, speech change and focal weakness.   Psychiatric/Behavioral:  The patient is nervous/anxious.         Physical  Exam  Temp:  [36.3 °C (97.3 °F)-37 °C (98.6 °F)] 37 °C (98.6 °F)  Pulse:  [76-92] 92  Resp:  [17-18] 17  BP: (142-154)/(60-92) 142/92  SpO2:  [92 %-96 %] 96 %    Physical Exam  Vitals and nursing note reviewed.   HENT:      Head: Normocephalic and atraumatic.      Nose: No congestion.      Mouth/Throat:      Mouth: Mucous membranes are dry.   Eyes:      Extraocular Movements: Extraocular movements intact.      Conjunctiva/sclera: Conjunctivae normal.   Cardiovascular:      Rate and Rhythm: Normal rate and regular rhythm.   Pulmonary:      Effort: Pulmonary effort is normal.      Breath sounds: Normal breath sounds.   Abdominal:      General: There is no distension.      Tenderness: There is abdominal tenderness. There is no guarding or rebound.   Musculoskeletal:      Cervical back: No tenderness.      Right lower leg: No edema.      Left lower leg: No edema.   Skin:     General: Skin is warm and dry.   Neurological:      General: No focal deficit present.      Mental Status: She is alert and oriented to person, place, and time.      Cranial Nerves: No cranial nerve deficit.   Psychiatric:         Mood and Affect: Mood is anxious.         Fluids    Intake/Output Summary (Last 24 hours) at 10/26/2023 1556  Last data filed at 10/26/2023 0000  Gross per 24 hour   Intake 1156.57 ml   Output --   Net 1156.57 ml       Laboratory  Recent Labs     10/24/23  1921 10/25/23  0627 10/26/23  0042   WBC 10.1 7.6 9.6   RBC 4.42 4.19* 4.64   HEMOGLOBIN 12.2 11.5* 12.8   HEMATOCRIT 38.7 36.8* 41.4   MCV 87.6 87.8 89.2   MCH 27.6 27.4 27.6   MCHC 31.5* 31.3* 30.9*   RDW 47.0 47.3 47.8   PLATELETCT 306 253 308   MPV 10.5 10.6 10.3     Recent Labs     10/24/23  1017 10/25/23  0627 10/26/23  0042   SODIUM 135 139 136   POTASSIUM 3.6 3.9 3.8   CHLORIDE 99 107 102   CO2 19* 22 23   GLUCOSE 143* 97 112*   BUN 16 14 10   CREATININE 0.97 0.94 1.00   CALCIUM 10.6* 8.2* 8.5                   Imaging  CT-ABDOMEN-PELVIS WITH   Final Result       1.  Multiple ill-defined low-density liver lesions with expansion of the LEFT lobe, most likely metastases.   2.  Multiple small bibasilar pulmonary nodules concerning for metastasis as well.   3.  No focal mesenteric inflammatory process.   4.  Bilateral adnexal cystic lesions, larger on the RIGHT, serous inclusion cyst versus neoplasm.   5.  L2 lytic lesion concerning for metastasis.            CT-HEAD W/O   Final Result      1.  Mild atrophy and white matter changes.   2.  No acute intracranial hemorrhage or territorial infarct.   3.  Sphenoid sinusitis.   4.  Apparent postoperative change of RIGHT ocular globe with multiple metallic densities present.         DX-CHEST-PORTABLE (1 VIEW)   Final Result      No acute cardiopulmonary disease evident.      MR-ABDOMEN-WITH & W/O    (Results Pending)   MR-BRAIN-WITH & W/O    (Results Pending)        Assessment/Plan  GI bleed- (present on admission)  Assessment & Plan  IV Protonix  Plan for EGD and Colonoscopy tomorrow  For MRI abdomen       Metastatic melanoma (HCC)- (present on admission)  Assessment & Plan  Metastatic to liver, bone, eye, possibly lung  Oncology aware of admission    Elevated BP without diagnosis of hypertension- (present on admission)  Assessment & Plan  IV Labetalol as needed with parameters    Lactic acid acidosis- (present on admission)  Assessment & Plan  IVF hydration    Seizure (HCC)- (present on admission)  Assessment & Plan  EEG -  presence of intermittent left temporal slowing is suggestive of focal neuronal dysfunction, no epileptiform discharge or seizure seen   10/25/2023  Neurochecks   MRI brain pending  Seizure precautions  IV Ativan as needed        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 In review of yesterday's note (10/25/2023), there  are no changes except as documented above.

## 2023-10-26 NOTE — DISCHARGE PLANNING
HTH/SCP TCN chart review completed. Current discharge considerations are for dc to home with outpatient follow ups if indicated.  No new TCN needs identified at this time. TCN will continue to follow and collaborate with discharge planning team as additional post acute needs arise. Thank you.    Completed:  PT with recommendations for no additional needs on 10/25  Choice obtained: none indicated  SCP with [facility] PCP.

## 2023-10-26 NOTE — CARE PLAN
The patient is Watcher - Medium risk of patient condition declining or worsening    Shift Goals  Clinical Goals: monitor for GI bleed, pain mgmt, safety  Patient Goals: MRI, bowel prep, comfort, pain control  Family Goals: Safety, discuss changes in mentation    Progress made toward(s) clinical / shift goals:      Problem: Hemodynamics  Goal: Patient's hemodynamics, fluid balance and neurologic status will be stable or improve  Outcome: Progressing       Patient is not progressing towards the following goals:      Problem: Pain - Standard  Goal: Alleviation of pain or a reduction in pain to the patient’s comfort goal  Outcome: Not Met     Patient has moderate to severe abd pain. Managed with PRNs    Problem: Psychosocial  Goal: Patient's level of anxiety will decrease  Outcome: Not Met     Continues to have mild anxiety. Able to offer comfort to patient.     Problem: Bowel Elimination  Goal: Establish and maintain regular bowel function  Outcome: Not Met     Initiated bowel prep. Patient having multiple watery BMs. Awaiting for them to become clear.

## 2023-10-27 PROBLEM — E87.6 HYPOKALEMIA: Status: ACTIVE | Noted: 2023-01-01

## 2023-10-27 NOTE — DISCHARGE PLANNING
HTH/SCP TCN chart review completed. Current discharge considerations are for dc to home with outpatient follow ups if indicated. Note that 6 clicks mobility remains 19 and per review pt remains ambulatory with no AD as well. No new TCN needs identified at this time. TCN will continue to follow and collaborate with discharge planning team as additional post acute needs arise. Thank you.     Completed:  PT and OT with recommendations for no additional needs on 10/25  Choice obtained: none indicated  SCP with [facility] PCP.

## 2023-10-27 NOTE — ANESTHESIA TIME REPORT
Anesthesia Start and Stop Event Times     Date Time Event    10/27/2023 0757 Ready for Procedure     0816 Anesthesia Start     0908 Anesthesia Stop        Responsible Staff  10/27/23    Name Role Begin End    Kriss Otero M.D. Anesth 0816 0908        Overtime Reason:  no overtime (within assigned shift)    Comments:

## 2023-10-27 NOTE — PROCEDURES
OPERATIVE REPORT        PATIENT: Malaika Garcia  1955      PREOPERATIVE DIAGNOSIS/INDICATION: GI bleeding    POSTOPERATIVE DIAGNOSES: two colon polyps and ulcerated rectum, focal     PROCEDURE: COLONOSCOPY with snare polypectomy and biopsy    PHYSICIAN: Guadalupe Castelan MD    CONSENT:  OBTAINED. The risks, benefits and alternatives of the procedure were discussed in details. The risks include and are not limited to bleeding, infection, perforation, missed lesions, and sedations risks (cardiopulmonary compromise and allergic reaction to medications).    ANESTHESIA:  Per anesthesiologist.    LOCATION: [facility]    DESCRIPTION:  The patient presented to the procedure room.  After routine checkup was performed, patient was brought into endoscopy suite.  Patient was placed on his left lateral decubitus position.  Patient was sedated by anesthesia. Vital signs were monitored throughout procedure.  Oxygenation support was provided throughout procedure. Digital rectal examination was performed.  Then, a colonoscope was inserted into patient's anus, advanced to the cecum under direct visualization.  Once the site was reached and examined, the colonoscope was withdrawn and procedure was terminated. Withdrawal time was at least 6 minutes to ensure adequate examination.    The patient tolerated the procedure well.  There were no immediate complications.    The quality of the bowel preparation was good.       FINDINGS:  There was a 5 mm polyp in the ascending colon that was removed with cold snare polypectomy. There were two 2 mm polyps in sigmoid that were removed with cold biopsy forrceps. On retroflexion internal hemorrhoids were seen and an ulcer and erythema consistent with rectal mucosal prolapse. Biopsied    RECOMMENDATIONS:  Follow-up biopsy  Repeat colonoscopy in 5 years with Gastroenterologist out patient  Cortenema for one month, [place] QHS to treat rectal ulcer    This note has been transcribed with digital voice  recognition software and although it has been reviewed may contain grammatical or word errors

## 2023-10-27 NOTE — PROGRESS NOTES
Pt arrived to PACU stable. Pt remains A&Ox4, VSS, incisions are CDI, phone on bed no other belongings in PACU. No one called per pt request. Pt currently resting in bed in no acute distress.

## 2023-10-27 NOTE — CARE PLAN
Problem: Pain - Standard  Goal: Alleviation of pain or a reduction in pain to the patient’s comfort goal  Outcome: Progressing   Roxicodone 5 mg given.    Problem: Knowledge Deficit - Standard  Goal: Patient and family/care givers will demonstrate understanding of plan of care, disease process/condition, diagnostic tests and medications  Outcome: Progressing     Problem: Psychosocial  Goal: Patient's level of anxiety will decrease  Outcome: Progressing     Problem: Hemodynamics  Goal: Patient's hemodynamics, fluid balance and neurologic status will be stable or improve  Outcome: Progressing     Problem: Bowel Elimination  Goal: Establish and maintain regular bowel function  Outcome: Progressing     Problem: Self Care  Goal: Patient will have the ability to perform ADLs independently or with assistance (bathe, groom, dress, toilet and feed)  Outcome: Progressing   The patient is Watcher - Medium risk of patient condition declining or worsening    Shift Goals  Clinical Goals: pain control  Patient Goals: Rest  Family Goals: Safety. Discuss POC    Progress made toward(s) clinical / shift goals:      Patient is not progressing towards the following goals:  Pt had EGD and colonoscopy today.

## 2023-10-27 NOTE — PROCEDURES
OPERATIVE REPORT    PATIENT:   Malaika Garcia   1955       PREOPERATIVE DIAGNOSES/INDICATIONS: Gi bleed    POSTOPERATIVE DIAGNOSIS: 7 ulcers in antrum os stomach    PROCEDURE:  ESOPHAGOGASTRODUODENOSCOPY with biopsy    PHYSICIAN:  Guadalupe Castelan MD    ANESTHESIA:  Per anesthesiologist.    LOCATION: [facility]    CONSENT:  OBTAINED. The risks, benefits and alternatives of the procedure were discussed in details. The risks include and are not limited to bleeding, infection, perforation, missed lesions, and sedations risks (cardiopulmonary compromise and allergic reaction to medications).    DESCRIPTION: The patient presented to the procedure room.  After routine checkup was performed, patient was brought into the endoscopy suite.  Patient was placed on his left lateral decubitus position. A bite block was placed in patient's mouth. Patient was sedated by anesthesia.  Vital signs were monitored throughout the procedure.  Oxygenation support was provided throughout the procedure. Upper endoscope was inserted into patient's mouth and advanced to the second portion of the duodenum under direct visualization.      Once the site was reached and examined, the upper endoscope was withdrawn.  Retroflexion was made within the stomach.  The stomach was decompressed, scope was withdrawn and the procedure was terminated.     The patient tolerated the procedure well.  There were no immediate complications.    OPERATIVE FINDINGS:    1. Esophagus: normal  2. Stomach: Hiatal hernia, 7 large ulcers in the antrum of stomach and distal body. Cratered, all of them. Biopsied extensively. One had vessel underneath tissue. No blood or bleeding  3. Duodenum: erythema    IMPRESSION/RECOMMENDATIONS:  7 cratered gastric ulcers in stomach  Gastritis/duodenitis  Hiatal hernia    Await biopsy  PPI BID  REpeat EGd for healing in 3 months      This note has been transcribed with digital voice recognition software and although it has been reviewed may  contain grammatical or word errors

## 2023-10-27 NOTE — CARE PLAN
The patient is Stable - Low risk of patient condition declining or worsening    Shift Goals  Clinical Goals: Patient will have clear stools for pending scope. Neuro status will remain unchangd this shift. PRS 3/10 or less  Patient Goals: Pain control. Have Scope done  Family Goals: Safety. Discuss POC    Progress made toward(s) clinical / shift goals:       Problem: Knowledge Deficit - Standard  Goal: Patient and family/care givers will demonstrate understanding of plan of care, disease process/condition, diagnostic tests and medications  Outcome: Progressing  Note: Discussed POC with patient, family, and primary team; delayed EGD/colonoscopy and pending MRI. Educated patient on plans for bowel prep tonight and procedure tomorrow.      Problem: Hemodynamics  Goal: Patient's hemodynamics, fluid balance and neurologic status will be stable or improve  Outcome: Progressing  Note: Q4 neuro checks in place and remain unchanged from start of shift. IV fluids in place.         Problem: Self Care  Goal: Patient will have the ability to perform ADLs independently or with assistance (bathe, groom, dress, toilet and feed)  Outcome: Progressing  Note: Patient up self and independent with ADLs this shift.        Patient is not progressing towards the following goals:      Problem: Pain - Standard  Goal: Alleviation of pain or a reduction in pain to the patient’s comfort goal  Outcome: Not Progressing  Flowsheets (Taken 10/26/2023 4808)  Pain Rating Scale (NPRS): 6  Note: More complaints of pain this shift than previous. PRNs administered as ordered. Some relief but PRS goal 3/6 not achieved.      Problem: Bowel Elimination  Goal: Establish and maintain regular bowel function  Outcome: Not Progressing  Flowsheets (Taken 10/26/2023 0816)  Last BM: 10/26/23  Note: Multiple clear Bms this shift r/t bowel prep in progress.

## 2023-10-27 NOTE — ANESTHESIA POSTPROCEDURE EVALUATION
Patient: Malaika Garcia    Procedure Summary     Date: 10/27/23 Room / Location: [facility] OR 06 / SURGERY [facility]    Anesthesia Start: 0816 Anesthesia Stop: 0908    Procedures:       COLONOSCOPY (Anus)      GASTROSCOPY (Esophagus)      GASTROSCOPY, WITH BIOPSY (Esophagus)      COLONOSCOPY, WITH POLYPECTOMY (Anus)      COLONOSCOPY, WITH BIOPSY (Anus) Diagnosis: (Hiatal Hernia, Gastric Ulcers, Colon Polyps, Proctitis)    Surgeons: Guadalupe Castelan M.D. Responsible Provider: Kriss Otero M.D.    Anesthesia Type: MAC ASA Status: 2          Final Anesthesia Type: MAC  Last vitals  BP   Blood Pressure : 112/56    Temp   36.4 °C (97.6 °F)    Pulse   75   Resp   18    SpO2   100 %      Anesthesia Post Evaluation    Patient location during evaluation: PACU  Patient participation: complete - patient participated  Level of consciousness: awake and alert  Pain score: 1    Airway patency: patent  Anesthetic complications: no  Cardiovascular status: adequate and hemodynamically stable  Respiratory status: acceptable  Hydration status: acceptable    PONV: none          No notable events documented.     Nurse Pain Score: 0 (NPRS)

## 2023-10-27 NOTE — PROGRESS NOTES
Hospital Medicine Daily Progress Note    Date of Service  10/27/2023    Chief Complaint  Malaika Garcia is a 68 y.o. female admitted 10/24/2023 with Seizure    Hospital Course  Admitted with possible seizure.  Also with complaints of dark stools for the past 3 days.  Patient with known history of metastatic melanoma to liver, bone, eye, and possibly to lung.  She was started on IV Protonix.  Gastroenterology was consulted on the case    Interval Problem Update  Seizure - no episodes, MRI brain pending  GIB - EGD and Colonoscopy done today  Low potassium    Updates given and plan of care discussed with patient's spouse who was at bedside.    I have discussed this patient's plan of care and discharge plan at IDT rounds today with Case Management, Nursing, Nursing leadership, and other members of the IDT team.    Consultants/Specialty  GI and oncology    Code Status  DNAR/DNI    Disposition  The patient is not medically cleared for discharge to home or a post-acute facility.  Anticipate discharge to: home with close outpatient follow-up    I have placed the appropriate orders for post-discharge needs.    Review of Systems  Review of Systems   Constitutional:  Positive for malaise/fatigue. Negative for chills, diaphoresis and fever.   HENT:  Negative for congestion, hearing loss and sore throat.    Eyes:  Negative for blurred vision.   Respiratory:  Negative for cough, shortness of breath and wheezing.    Cardiovascular:  Negative for chest pain, palpitations and leg swelling.   Gastrointestinal:  Positive for abdominal pain and nausea. Negative for diarrhea, heartburn and vomiting.   Genitourinary:  Negative for dysuria, flank pain and hematuria.   Musculoskeletal:  Negative for back pain, joint pain, myalgias and neck pain.   Skin:  Negative for rash.   Neurological:  Positive for weakness and headaches. Negative for dizziness, sensory change, speech change and focal weakness.   Psychiatric/Behavioral:  The patient is  nervous/anxious.         Physical Exam  Temp:  [36 °C (96.8 °F)-37.5 °C (99.5 °F)] 36.6 °C (97.8 °F)  Pulse:  [68-82] 71  Resp:  [15-18] 15  BP: (111-137)/(51-77) 128/66  SpO2:  [93 %-100 %] 98 %    Physical Exam  Vitals and nursing note reviewed.   HENT:      Head: Normocephalic and atraumatic.      Nose: No congestion.      Mouth/Throat:      Mouth: Mucous membranes are dry.   Eyes:      Extraocular Movements: Extraocular movements intact.      Conjunctiva/sclera: Conjunctivae normal.   Cardiovascular:      Rate and Rhythm: Normal rate and regular rhythm.   Pulmonary:      Effort: Pulmonary effort is normal.      Breath sounds: Normal breath sounds.   Abdominal:      General: There is no distension.      Tenderness: There is abdominal tenderness. There is no guarding or rebound.   Musculoskeletal:      Cervical back: No tenderness.      Right lower leg: No edema.      Left lower leg: No edema.   Skin:     General: Skin is warm and dry.   Neurological:      General: No focal deficit present.      Mental Status: She is alert and oriented to person, place, and time.      Cranial Nerves: No cranial nerve deficit.   Psychiatric:         Mood and Affect: Mood is anxious.         Fluids  No intake or output data in the 24 hours ending 10/27/23 1549      Laboratory  Recent Labs     10/25/23  0627 10/26/23  0042 10/27/23  0010   WBC 7.6 9.6 11.1*   RBC 4.19* 4.64 3.99*   HEMOGLOBIN 11.5* 12.8 11.2*   HEMATOCRIT 36.8* 41.4 35.0*   MCV 87.8 89.2 87.7   MCH 27.4 27.6 28.1   MCHC 31.3* 30.9* 32.0*   RDW 47.3 47.8 46.6   PLATELETCT 253 308 228   MPV 10.6 10.3 10.7     Recent Labs     10/25/23  0627 10/26/23  0042 10/27/23  0010   SODIUM 139 136 134*   POTASSIUM 3.9 3.8 3.5*   CHLORIDE 107 102 102   CO2 22 23 21   GLUCOSE 97 112* 98   BUN 14 10 10   CREATININE 0.94 1.00 0.77   CALCIUM 8.2* 8.5 7.7*                   Imaging  CT-ABDOMEN-PELVIS WITH   Final Result      1.  Multiple ill-defined low-density liver lesions with  expansion of the LEFT lobe, most likely metastases.   2.  Multiple small bibasilar pulmonary nodules concerning for metastasis as well.   3.  No focal mesenteric inflammatory process.   4.  Bilateral adnexal cystic lesions, larger on the RIGHT, serous inclusion cyst versus neoplasm.   5.  L2 lytic lesion concerning for metastasis.            CT-HEAD W/O   Final Result      1.  Mild atrophy and white matter changes.   2.  No acute intracranial hemorrhage or territorial infarct.   3.  Sphenoid sinusitis.   4.  Apparent postoperative change of RIGHT ocular globe with multiple metallic densities present.         DX-CHEST-PORTABLE (1 VIEW)   Final Result      No acute cardiopulmonary disease evident.      MR-ABDOMEN-WITH & W/O    (Results Pending)   MR-BRAIN-WITH & W/O    (Results Pending)        Assessment/Plan  GI bleed- (present on admission)  Assessment & Plan  IV Protonix  EGD - 7 cratered gastric ulcers in stomach, Gastritis/duodenitis, Hiatal hernia   10/27/2023  Colonoscopy - 5 mm polyp in the ascending colon that was removed with cold snare polypectomy, two 2 mm polyps in sigmoid that were removed with cold biopsy forceps, on retroflexion internal hemorrhoids were seen and an ulcer and erythema consistent with rectal mucosal prolapse    10/27/2023  Follow up Pathology results  Start Clear liquids         Metastatic melanoma (HCC)- (present on admission)  Assessment & Plan  Metastatic to liver, bone, eye, possibly lung  Oncology following  For MRI brain and abdomen    Hypokalemia- (present on admission)  Assessment & Plan  Start Kdur, follow bmp    Elevated BP without diagnosis of hypertension- (present on admission)  Assessment & Plan  IV Labetalol as needed with parameters    Lactic acid acidosis- (present on admission)  Assessment & Plan  Stop IVF hydration    Seizure (HCC)- (present on admission)  Assessment & Plan  EEG -  presence of intermittent left temporal slowing is suggestive of focal neuronal  dysfunction, no epileptiform discharge or seizure seen   10/25/2023  Neurochecks   MRI brain pending  Seizure precautions  IV Ativan as needed        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 In review of yesterday's note (10/26/2023), there are no changes except as documented above.

## 2023-10-27 NOTE — CARE PLAN
The patient is Watcher - Medium risk of patient condition declining or worsening    Shift Goals  Clinical Goals: Q 4 neuro, colonoscopy  Patient Goals: Rest  Family Goals: Safety. Discuss POC    Progress made toward(s) clinical / shift goals:       Problem: Knowledge Deficit - Standard  Goal: Patient and family/care givers will demonstrate understanding of plan of care, disease process/condition, diagnostic tests and medications  Description: Target End Date:  1-3 days or as soon as patient condition allows    Document in Patient Education    1.  Patient and family/caregiver oriented to unit, equipment, visitation policy and means for communicating concern  2.  Complete/review Learning Assessment  3.  Assess knowledge level of disease process/condition, treatment plan, diagnostic tests and medications  4.  Explain disease process/condition, treatment plan, diagnostic tests and medications  Outcome: Progressing  Note: Patient understands the need for Q4 neuro's and colonoscopy.

## 2023-10-27 NOTE — ANESTHESIA PREPROCEDURE EVALUATION
Case: [id number] Date/Time: 10/27/23 0855    Procedures:       COLONOSCOPY (Anus)      GASTROSCOPY (Esophagus)    Anesthesia type: MAC    Pre-op diagnosis: GI Bleed, Abdominal Pain    Location: [facility] OR 06 / SURGERY [facility]    Surgeons: Guadalupe Castelan M.D.        68yoF with hypothyroid, restless leg    +questionable seizure acitvity in past- not on meds- eval by neurologist a few years ago    Allergies to keflex, sulfa  NPO  No AC  +woke up during previous endoscopy    Relevant Problems   NEURO   (positive) Seizure (HCC)      ENDO   (positive) Acquired hypothyroidism      Other   (positive) Sacroiliitis, not elsewhere classified (HCC)       Physical Exam    Airway   Mallampati: II       Cardiovascular - normal exam     Dental - normal exam           Pulmonary - normal exam     Abdominal - normal exam     Neurological - normal exam                 Anesthesia Plan    ASA 2       Plan - MAC               Induction: intravenous      Pertinent diagnostic labs and testing reviewed    Informed Consent:    Anesthetic plan and risks discussed with patient.

## 2023-10-28 PROBLEM — K62.6 RECTAL ULCER: Status: ACTIVE | Noted: 2023-01-01

## 2023-10-28 PROBLEM — E83.42 HYPOMAGNESEMIA: Status: ACTIVE | Noted: 2023-01-01

## 2023-10-28 NOTE — CARE PLAN
Problem: Pain - Standard  Goal: Alleviation of pain or a reduction in pain to the patient’s comfort goal  Outcome: Progressing  Pt rated pain 10/10 after MRI; MD notified; dose of pain med increased; Oxycodone 10 mg given with positive results.     Problem: Knowledge Deficit - Standard  Goal: Patient and family/care givers will demonstrate understanding of plan of care, disease process/condition, diagnostic tests and medications  Outcome: Progressing     Problem: Psychosocial  Goal: Patient's level of anxiety will decrease  Outcome: Progressing     Problem: Hemodynamics  Goal: Patient's hemodynamics, fluid balance and neurologic status will be stable or improve  Outcome: Progressing     Problem: Bowel Elimination  Goal: Establish and maintain regular bowel function  Outcome: Progressing     Problem: Self Care  Goal: Patient will have the ability to perform ADLs independently or with assistance (bathe, groom, dress, toilet and feed)  Outcome: Progressing     Problem: Self Care  Goal: Patient will have the ability to perform ADLs independently or with assistance (bathe, groom, dress, toilet and feed)  Outcome: Progressing   The patient is Watcher - Medium risk of patient condition declining or worsening    Shift Goals  Clinical Goals: pain control, tolerate MRI  Patient Goals: Pain control, rest  Family Goals: Safety. Discuss POC    Progress made toward(s) clinical / shift goals:      Patient is not progressing towards the following goals:

## 2023-10-28 NOTE — PROGRESS NOTES
[facility] Medicine Daily Progress Note    Date of Service  10/28/2023    Chief Complaint  Malaika Garcia is a 68 y.o. female admitted 10/24/2023 with Seizure    Hospital Course  Admitted with possible seizure.  Also with complaints of dark stools for the past 3 days.  Patient with known history of metastatic melanoma to liver, bone, eye, and possibly to lung.  She was started on IV Protonix.  Gastroenterology was consulted on the case    Interval Problem Update  Seizure - no episodes, MRI brain reviewed  GIB - EGD and Colonoscopy results noted, pathology results reviewed, discussed case with Gastroenterology  Metastatic melanoma - MRIs reviewed  Low magnesium    Updates given and plan of care discussed with patient's spouse who was at bedside.    I have discussed this patient's plan of care and discharge plan at IDT rounds today with Case Management, Nursing, Nursing leadership, and other members of the IDT team.    Consultants/Specialty  GI, oncology, and Surgery oncology    Code Status  DNAR/DNI    Disposition  The patient is not medically cleared for discharge to home or a post-acute facility.  Anticipate discharge to: home with close outpatient follow-up    I have placed the appropriate orders for post-discharge needs.    Review of Systems  Review of Systems   Constitutional:  Positive for malaise/fatigue. Negative for chills, diaphoresis and fever.   HENT:  Negative for congestion, hearing loss and sore throat.    Eyes:  Negative for blurred vision.   Respiratory:  Negative for cough, shortness of breath and wheezing.    Cardiovascular:  Negative for chest pain, palpitations and leg swelling.   Gastrointestinal:  Positive for abdominal pain and nausea. Negative for diarrhea, heartburn and vomiting.   Genitourinary:  Negative for dysuria, flank pain and hematuria.   Musculoskeletal:  Negative for back pain, joint pain, myalgias and neck pain.   Skin:  Negative for rash.   Neurological:  Positive for weakness and  headaches. Negative for dizziness, sensory change, speech change and focal weakness.   Psychiatric/Behavioral:  The patient is nervous/anxious.         Physical Exam  Temp:  [36.2 °C (97.1 °F)-36.3 °C (97.3 °F)] 36.3 °C (97.3 °F)  Pulse:  [72-88] 72  Resp:  [16-18] 16  BP: (125-149)/(67-84) 145/67  SpO2:  [91 %-93 %] 93 %    Physical Exam  Vitals and nursing note reviewed.   HENT:      Head: Normocephalic and atraumatic.      Nose: No congestion.      Mouth/Throat:      Mouth: Mucous membranes are dry.   Eyes:      Extraocular Movements: Extraocular movements intact.      Conjunctiva/sclera: Conjunctivae normal.   Cardiovascular:      Rate and Rhythm: Normal rate and regular rhythm.   Pulmonary:      Effort: Pulmonary effort is normal.      Breath sounds: Normal breath sounds.   Abdominal:      General: There is no distension.      Tenderness: There is abdominal tenderness. There is no guarding or rebound.   Musculoskeletal:      Cervical back: No tenderness.      Right lower leg: No edema.      Left lower leg: No edema.   Skin:     General: Skin is warm and dry.   Neurological:      General: No focal deficit present.      Mental Status: She is alert and oriented to person, place, and time.      Cranial Nerves: No cranial nerve deficit.   Psychiatric:         Mood and Affect: Mood is anxious.         Fluids  No intake or output data in the 24 hours ending 10/28/23 1501      Laboratory  Recent Labs     10/26/23  0042 10/27/23  0010 10/28/23  0007   WBC 9.6 11.1* 9.9   RBC 4.64 3.99* 4.09*   HEMOGLOBIN 12.8 11.2* 11.4*   HEMATOCRIT 41.4 35.0* 35.8*   MCV 89.2 87.7 87.5   MCH 27.6 28.1 27.9   MCHC 30.9* 32.0* 31.8*   RDW 47.8 46.6 47.0   PLATELETCT 308 228 238   MPV 10.3 10.7 11.1     Recent Labs     10/26/23  0042 10/27/23  0010 10/28/23  0007   SODIUM 136 134* 136   POTASSIUM 3.8 3.5* 4.1   CHLORIDE 102 102 102   CO2 23 21 24   GLUCOSE 112* 98 87   BUN 10 10 9   CREATININE 1.00 0.77 0.73   CALCIUM 8.5 7.7* 8.2*                    Imaging  MR-BRAIN-WITH & W/O   Final Result      1.  No evidence of intracranial metastatic disease   2.  Supratentorial white matter changes in the pattern moderately suspicious for hypertensive encephalopathy. Demyelinating disease might have a similar appearance.   3.  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  Postsurgical changes in the RIGHT globe with an indistinctly visualized posterior chamber mass without definite enhancement. This is smaller than on the prior study. Recommend correlation with history. Better anatomic assessment might be performed    with orbital MRI if clinically appropriate.      [id number]   Final Result      New multifocal solid liver masses, most consistent with metastases. Largest measures 12 cm.      CT-ABDOMEN-PELVIS WITH   Final Result      1.  Multiple ill-defined low-density liver lesions with expansion of the LEFT lobe, most likely metastases.   2.  Multiple small bibasilar pulmonary nodules concerning for metastasis as well.   3.  No focal mesenteric inflammatory process.   4.  Bilateral adnexal cystic lesions, larger on the RIGHT, serous inclusion cyst versus neoplasm.   5.  L2 lytic lesion concerning for metastasis.            CT-HEAD W/O   Final Result      1.  Mild atrophy and white matter changes.   2.  No acute intracranial hemorrhage or territorial infarct.   3.  Sphenoid sinusitis.   4.  Apparent postoperative change of RIGHT ocular globe with multiple metallic densities present.         DX-CHEST-PORTABLE (1 VIEW)   Final Result      No acute cardiopulmonary disease evident.           Assessment/Plan  GI bleed- (present on admission)  Assessment & Plan  IV Protonix  EGD - 7 cratered gastric ulcers in stomach, Gastritis/duodenitis, Hiatal hernia   10/27/2023  Colonoscopy - 5 mm polyp in the ascending colon that was removed with cold snare polypectomy, two 2 mm polyps in sigmoid that were removed  with cold biopsy forceps, on retroflexion internal hemorrhoids were seen and an ulcer and erythema consistent with rectal mucosal prolapse    10/27/2023  Advance to full liquids         Metastatic melanoma (HCC)- (present on admission)  Assessment & Plan  Metastatic to liver, bone, eye, possibly lung  Oncology following  Consult Surgery oncology    Hypomagnesemia- (present on admission)  Assessment & Plan  IV Mg 2 g   Follow level    Rectal ulcer- (present on admission)  Assessment & Plan  Mark enema    Hypokalemia- (present on admission)  Assessment & Plan  stop Kdur, follow bmp    Elevated BP without diagnosis of hypertension- (present on admission)  Assessment & Plan  IV Labetalol as needed with parameters    Lactic acid acidosis- (present on admission)  Assessment & Plan  Stop IVF hydration    Seizure (HCC)- (present on admission)  Assessment & Plan  EEG -  presence of intermittent left temporal slowing is suggestive of focal neuronal dysfunction, no epileptiform discharge or seizure seen   10/25/2023  Neurochecks   Seizure precautions  IV Ativan as needed        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 In review of yesterday's note (10/27/2023), there are no changes except as documented above.

## 2023-10-28 NOTE — PROGRESS NOTES
Gastroenterology update:    Assessment:  Gastric ulcers x 7, biopsied  Polypectomy x 3, biopsied    Recommendations:   -Continue corenema for one month per rectum at night to treat rectal ulcer  -Continue PPI BID until gastroenterology follow up  -Avoid NSAIDs  -GI team will follow pathology and notify patient of the results  -Repeat EGD in 3 months to document healing and colonoscopy in 5 years with outpatient gastroenterology, referral placed    No further interventions from acute GI team. Signing off. Please don't hesitate to contact us with any questions or concerns.    ..ROSALINE Ricardo  ..

## 2023-10-28 NOTE — CARE PLAN
The patient is Watcher - Medium risk of patient condition declining or worsening    Shift Goals  Clinical Goals: Pain control, BX results  Patient Goals: Pain control, rest  Family Goals: Safety. Discuss POC    Progress made toward(s) clinical / shift goals:       Problem: Knowledge Deficit - Standard  Goal: Patient and family/care givers will demonstrate understanding of plan of care, disease process/condition, diagnostic tests and medications  Description: Target End Date:  1-3 days or as soon as patient condition allows    Document in Patient Education    1.  Patient and family/caregiver oriented to unit, equipment, visitation policy and means for communicating concern  2.  Complete/review Learning Assessment  3.  Assess knowledge level of disease process/condition, treatment plan, diagnostic tests and medications  4.  Explain disease process/condition, treatment plan, diagnostic tests and medications  Outcome: Progressing  Note: Patient understands the need for Q4 neuro's and monitor pain.

## 2023-10-29 NOTE — CARE PLAN
The patient is Stable - Low risk of patient condition declining or worsening    Shift Goals  Clinical Goals: pain control, tolerate MRI  Patient Goals: Pain control, rest  Family Goals: Safety. Discuss POC    Progress made toward(s) clinical / shift goals:        Problem: Pain - Standard  Goal: Alleviation of pain or a reduction in pain to the patient’s comfort goal  Outcome: Progressing  Note: Pts pain level is well controlled with PRNs.      Problem: Knowledge Deficit - Standard  Goal: Patient and family/care givers will demonstrate understanding of plan of care, disease process/condition, diagnostic tests and medications  Outcome: Progressing  Note: Pt understanding of new orders and accepting of plan of care

## 2023-10-29 NOTE — DISCHARGE SUMMARY
Discharge Summary    CHIEF COMPLAINT ON ADMISSION  Chief Complaint   Patient presents with    Mental Status Change    Seizure     Possible seizure       Reason for Admission  EMS     Admission Date  10/24/2023    CODE STATUS  DNAR/DNI    HPI & HOSPITAL COURSE  This is a 68 y.o. female here with  possible seizure.  Also with complaints of dark stools for the past 3 days.  Patient with known history of metastatic melanoma to liver, bone, eye, and possibly to lung.  Oncology was consulted on the case.  She was started on IV Protonix. Gastroenterology was consulted on the case.  Patient underwent EGD - 7 cratered gastric ulcers in stomach, Gastritis/duodenitis, Hiatal hernia and Colonoscopy - 5 mm polyp in the ascending colon that was removed with cold snare polypectomy, two 2 mm polyps in sigmoid that were removed with cold biopsy forceps, on retroflexion internal hemorrhoids were seen and an ulcer and erythema consistent with rectal mucosal prolapse on 10/27/2023.  She was started on clear liquids and slowly advanced to a GI soft diet yesterday which she was able to tolerate.  PPI was continued, she was also given Mark enema for the rectal ulcer.  Pathology was negative for malignancy.  H. pylori was noted to be negative.  Gastroenterology has cleared her for discharge.  MRI of the brain and MR I of the abdomen were done.  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  MRI the abdomen showed worsening liver metastases.  These findings were discussed with oncology who recommended follow-up as outpatient.  Patient was advised not to use any NSAIDs or aspirin.  She will also need repeat EGD in 3 months to document healing of the gastric ulcers.  Plan of care and discharge plan was discussed with the patient's spouse who was at bedside.      Therefore, she is discharged in good and stable condition to home with close outpatient follow-up.    The patient met 2-midnight criteria for an  inpatient stay at the time of discharge.    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 *      FOLLOW UP  No future appointments.  Srinath Rooney D.O.  [address] [facility] [address]  [facility] [id number] [phone]    Follow up      Oly Wang P.A.-C.  [address] [facility] [address]  [facility] [id number] [phone]    Follow up        MEDICATIONS ON DISCHARGE     Medication List        START taking these medications        Instructions   hydrocortisone 100 MG/60ML Enem  Commonly known as: Cortenema   Insert 1 Enema into the rectum at bedtime.  (Insert 1 Enema into the rectum at bedtime.)  Dose: 100 mg     omeprazole 40 MG delayed-release capsule  Commonly known as: PriLOSEC   Take 1 Capsule by mouth 2 times a day.  (Take 1 Capsule by mouth 2 times a day.)  Dose: 40 mg     ondansetron 4 MG Tbdp  Commonly known as: Zofran ODT   Take 1 Tablet by mouth every 6 hours as needed for Nausea/Vomiting.  Dose: 4 mg     oxyCODONE immediate release 10 MG immediate release tablet  Commonly known as: Roxicodone   Take 1 Tablet by mouth every 6 hours as needed for Moderate Pain or Severe Pain for up to 5 days.  Dose: 10 mg            CONTINUE taking these medications        Instructions   Acetaminophen 500 MG Caps   Take 1,000 mg by mouth 3 times a day as needed (alternating with IBU). Indications: Pain  Dose: 1,000 mg            STOP taking these medications      ibuprofen 200 MG Tabs  Commonly known as: Motrin              Allergies  Allergies   Allergen Reactions    Keflex Unspecified      Cannot remember    Sulfa Drugs Hives       DIET  Orders Placed This Encounter   Procedures    Diet Order Diet: Low Fiber(GI Soft)     Standing Status:   Standing     Number of Occurrences:   1     Order Specific Question:   Diet:     Answer:   Low Fiber(GI Soft) [2]       ACTIVITY  As tolerated.  Weight bearing as tolerated    CONSULTATIONS  Gastroenterology  Oncology - [place]    PROCEDURES  EGD - 7 cratered gastric ulcers in stomach, Gastritis/duodenitis, Hiatal hernia   10/27/2023    Colonoscopy - 5 mm polyp in the ascending colon that was removed with cold snare polypectomy, two 2 mm polyps in sigmoid that were removed with cold biopsy forceps, on retroflexion internal hemorrhoids were seen and an ulcer and erythema consistent with rectal mucosal prolapse    10/27/2023    LABORATORY  Lab Results   Component Value Date    SODIUM 133 (L) 10/29/2023    POTASSIUM 3.9 10/29/2023    CHLORIDE 101 10/29/2023    CO2 20 10/29/2023    GLUCOSE 141 (H) 10/29/2023    BUN 14 10/29/2023    CREATININE 0.94 10/29/2023        Lab Results   Component Value Date    WBC 11.2 (H) 10/29/2023    HEMOGLOBIN 11.7 (L) 10/29/2023    HEMATOCRIT 37.1 10/29/2023    PLATELETCT 335 10/29/2023        Total time of the discharge process exceeds 35 minutes.

## 2023-10-29 NOTE — PROGRESS NOTES
Discharge instructions gone over with pt. Pt verbalized understanding. PIV dc'd with tip intact. Meds to beds delivered. Pt's  at beside to give pt ride home, Pt transferred to car via wheelchair,  to drive pt home.

## 2023-10-30 NOTE — DISCHARGE PLANNING
HTH/SCP TCN chart review completed. Current discharge considerations are for discharge to home with close outpatient follow ups.  Patient is ambulatory with no AD as well. No new TCN needs identified at this time. TCN will continue to follow and collaborate with discharge planning team as additional post acute needs arise. Thank you    Completed:  PT and OT with recommendations for no additional needs on 10/25  Choice obtained: none indicated  SCP with [facility] PCP.

## 2023-10-31 NOTE — PROGRESS NOTES
Transitional Care Management    Two attempts were made to contact this patient within two business days to review discharge per CMS guidelines

## 2023-11-01 NOTE — TELEPHONE ENCOUNTER
LVM asking the patient to call the office and schedule a post discharge follow up appointment with Dr Rooney. Please schedule at Dr Rooney's next available.

## 2023-11-02 PROBLEM — K21.9 GASTROESOPHAGEAL REFLUX DISEASE: Status: ACTIVE | Noted: 2023-01-01

## 2023-11-02 NOTE — PROGRESS NOTES
"Virtual Visit: Established Patient   This visit was conducted via Zoom using secure and encrypted videoconferencing technology.   The patient was in their home in the state of [place].    The patient's identity was confirmed and verbal consent was obtained for this virtual visit.     Subjective:   CC:   Chief Complaint   Patient presents with    Hospital Follow-up     ER visit 10/24/23     Medication Refill     Oxycodone        Malaika Garcia is a 68 y.o. female presenting for evaluation and management of:  Hospital follow-up  Patient presents today for hospital follow-up, admitted 10/24 through 10/29 for possible seizure and dark stools x3 days.  Patient with known metastatic melanoma to liver, bone, eye, lung.  Patient was started on IV Protonix and GI consulted for EGD that showed 7 cratered gastric ulcers in stomach, gastritis/duodenitis, hiatal hernia, colonoscopy with 5 mm polyp in ascending colon and two 2 mm polyps in sigmoid, internal hemorrhoids and ulcer consistent with rectal mucosal prolapse.      Patient continues on omeprazole 40 mg twice daily, Zofran as needed and hydrocortisone enema at bedtime for rectal ulcer.  Patient given prescription for oxycodone x5 days.    MRI of brain and abdomen completed.  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  MRI of abdomen shows worsening liver metastases.  Patient will need repeat EGD in 3 months.    Needs follow-up with GI and oncology.    History of seizure disorder  Patient reports she has not had a seizure since 2013 and was discharged from neurology    Restless leg syndrome  Patient using Mirapex 1 mg twice a day for years and states this is helpful.  Patient states that she needs trazodone in order to sleep because if she lays there awake the restless leg syndrome increases and makes it impossible to sleep.  Patient using trazodone 50 mg.    ROS      Objective:   Ht 1.676 m (5' 6\") Comment: per pt  Wt 79.4 kg (175 lb) " Comment: Per pt  BMI 28.25 kg/m²     Physical Exam:  Constitutional: Alert, no distress, well-groomed.  Skin: No rashes in visible areas.  Eye: Round. Conjunctiva clear, lids normal. No icterus.   ENMT: Lips pink without lesions, good dentition, moist mucous membranes. Phonation normal.  Neck: No masses, no thyromegaly. Moves freely without pain.  Respiratory: Unlabored respiratory effort, no cough or audible wheeze  Psych: Alert and oriented x3, normal affect and mood.     Assessment and Plan:   The following treatment plan was discussed:     1. 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  Patient presents today for hospital follow-up after finding out that anterior uvula melanoma of right eye is metastatic.  Patient notes that she did not know it was metastatic prior to hospital visit.  Patient is calling coming attempting to get appointment with Dr. Rooney, oncology.  2. Metastatic melanoma (HCC)  Chronic, stable.  Patient with mets to liver.  Patient notes that she is having pain from multiple GI ulcers.  Patient provided oxycodone on discharge, notes that she is only taking this as needed.  Will refill prescription until she can get in with GI and oncology.  No aberrant or addictive use of medications has been observed.  No adverse events have been reported.  Patient counseled to not add Tylenol to current regimen.  Patient counseled to keep medications locked up or under personal control.  [facility] board of pharmacy interface is reviewed.  No inconsistencies are found.  This is within CDC guideline for chronic pain prescribing by primary care.   - oxyCODONE immediate release (ROXICODONE) 10 MG immediate release tablet; Take 1 Tablet by mouth every 6 hours as needed for Moderate Pain or Severe Pain for up to 5 days.  Dispense: 20 Tablet; Refill: 0    3. Seizure (HCC)  Chronic, stable.  Patient with history of seizures.  Unclear if hospital visit was secondary to seizures, no findings on MRI.   Patient was not started on Keppra.  Past history of seizures.  Continue to monitor.    4. Rectal ulcer  5. Gastrointestinal hemorrhage, unspecified gastrointestinal hemorrhage type  Chronic, stable.  Patient needs follow-up with GI.  Patient continues on Zofran and omeprazole.    Follow-up: No follow-ups on file.

## 2023-11-02 NOTE — TELEPHONE ENCOUNTER
2nd attempt:  LVM asking the patient to call the office and schedule a post discharge follow up appointment with Dr Rooney. Please schedule at Dr Rooney's next available.

## 2023-11-06 NOTE — PROGRESS NOTES
Follow Up Note:  Hematology/Oncology      Primary Care:  Oly Wang P.A.-C.    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  woman who presents to the clinic for evaluation for a new diagnosis of suspected uveal melanoma of the R eye. She first noticed symptoms in the past month, when she was in [place] with her daughters. She thought her glasses were dirty and kept trying to clean them, but her vision worsened in the R eye. In early May, she went to see an ophthalmologist and on exam was found to have a mass in the right eye, 25 mm x 17 mm x 12 mm, with associated subretinal fluid. She was referred to [facility] for ocular oncology evaluation, and to her primary physician for staging work up. She has subsequently been referred to me.     Treatment History:   07/18/22: Start XRT 5600 cGy in 4 daily fractions (done at [facility])    Interval History:  Patient presents for follow up. She did not have any intracranial metastases. She is feeling very fatigued but otherwise okay, and wants to talk about therapy options. Her significant other is with her today.     Allergies as of 11/06/2023 - Reviewed 11/06/2023   Allergen Reaction Noted    Sulfa drugs Hives 12/04/2017    Cephalexin  05/09/2018    Keflex Unspecified 05/09/2018    Sulfamethoxazole w-trimethoprim Hives 12/04/2017         Current Outpatient Medications:     oxyCODONE immediate release (ROXICODONE) 10 MG immediate release tablet, Take 1 Tablet by mouth every 6 hours as needed for Moderate Pain or Severe Pain for up to 5 days., Disp: 20 Tablet, Rfl: 0    hydrocortisone (CORTENEMA) 100 MG/60ML Enema, Insert 1 Enema into the rectum at bedtime., Disp: 1680 mL, Rfl: 0    ondansetron (ZOFRAN ODT) 4 MG TABLET  "DISPERSIBLE, Take 1 Tablet by mouth every 6 hours as needed for Nausea/Vomiting., Disp: 30 Tablet, Rfl: 0    omeprazole (PRILOSEC) 40 MG delayed-release capsule, Take 1 Capsule by mouth 2 times a day., Disp: 60 Capsule, Rfl: 1    Acetaminophen 500 MG Cap, Take 1,000 mg by mouth 3 times a day as needed (alternating with IBU). Indications: Pain, Disp: , Rfl:       Review of Systems:  Review of Systems   Constitutional:  Positive for malaise/fatigue. Negative for chills, diaphoresis, fever and weight loss.   HENT:  Negative for hearing loss, nosebleeds, sinus pain and sore throat.    Eyes:  Negative for blurred vision and double vision.   Respiratory:  Negative for cough, sputum production, shortness of breath and wheezing.    Cardiovascular:  Negative for chest pain, palpitations, orthopnea and leg swelling.   Gastrointestinal:  Negative for abdominal pain, blood in stool, constipation, diarrhea, heartburn, melena, nausea and vomiting.   Genitourinary:  Negative for dysuria, frequency, hematuria and urgency.   Musculoskeletal:  Positive for back pain. Negative for joint pain and myalgias.   Skin:  Negative for rash.   Neurological:  Negative for dizziness, tingling, seizures, weakness and headaches.   Endo/Heme/Allergies:  Does not bruise/bleed easily.   Psychiatric/Behavioral:  The patient is not nervous/anxious and does not have insomnia.          Physical Exam:  Vitals:    11/06/23 0808   BP: 118/78   Pulse: 82   Resp: 16   Temp: 35.9 °C (96.7 °F)   TempSrc: Temporal   SpO2: 94%   Weight: 80.5 kg (177 lb 6.4 oz)   Height: 1.676 m (5' 5.98\")       DESC; KARNOFSKY SCALE WITH ECOG EQUIVALENT: 80, Normal activity with effort; some signs or symptoms of disease (ECOG equivalent 1)    DISTRESS LEVEL: no apparent distress    Physical Exam  Vitals and nursing note reviewed.   Constitutional:       General: She is awake. She is not in acute distress.     Appearance: Normal appearance. She is normal weight. She is not " ill-appearing, toxic-appearing or diaphoretic.   HENT:      Head: Normocephalic and atraumatic.      Nose: Nose normal. No congestion.      Mouth/Throat:      Pharynx: Oropharynx is clear. No oropharyngeal exudate or posterior oropharyngeal erythema.   Eyes:      General: No scleral icterus.     Extraocular Movements: Extraocular movements intact.      Conjunctiva/sclera: Conjunctivae normal.      Pupils: Pupils are equal, round, and reactive to light.   Cardiovascular:      Rate and Rhythm: Normal rate and regular rhythm.      Pulses: Normal pulses.      Heart sounds: Normal heart sounds. No murmur heard.     No friction rub. No gallop.   Pulmonary:      Effort: Pulmonary effort is normal.      Breath sounds: Normal breath sounds. No decreased air movement. No wheezing, rhonchi or rales.   Abdominal:      General: Bowel sounds are normal. There is no distension.      Tenderness: There is no abdominal tenderness.   Musculoskeletal:         General: No deformity. Normal range of motion.      Cervical back: Normal range of motion and neck supple. No tenderness.      Right lower leg: No edema.      Left lower leg: No edema.   Lymphadenopathy:      Cervical: No cervical adenopathy.      Upper Body:      Right upper body: No axillary adenopathy.      Left upper body: No axillary adenopathy.      Lower Body: No right inguinal adenopathy. No left inguinal adenopathy.   Skin:     General: Skin is warm and dry.      Coloration: Skin is not jaundiced.      Findings: No erythema or rash.   Neurological:      General: No focal deficit present.      Mental Status: She is alert and oriented to person, place, and time.      Sensory: Sensation is intact.      Motor: Motor function is intact. No weakness.      Gait: Gait is intact.   Psychiatric:         Attention and Perception: Attention normal.         Mood and Affect: Mood normal.         Behavior: Behavior normal. Behavior is cooperative.         Thought Content: Thought  content normal.         Judgment: Judgment normal.           Labs:  Admission on 10/24/2023   Component Date Value Ref Range Status    WBC 10/24/2023 9.4  4.8 - 10.8 K/uL Final    RBC 10/24/2023 4.97  4.20 - 5.40 M/uL Final    Hemoglobin 10/24/2023 14.0  12.0 - 16.0 g/dL Final    Hematocrit 10/24/2023 42.6  37.0 - 47.0 % Final    MCV 10/24/2023 85.7  81.4 - 97.8 fL Final    MCH 10/24/2023 28.2  27.0 - 33.0 pg Final    MCHC 10/24/2023 32.9  32.2 - 35.5 g/dL Final    Please note new reference range effective 05/22/2023.    RDW 10/24/2023 44.6  35.9 - 50.0 fL Final    Platelet Count 10/24/2023 307  164 - 446 K/uL Final    MPV 10/24/2023 11.2  9.0 - 12.9 fL Final    Neutrophils-Polys 10/24/2023 77.00 (H)  44.00 - 72.00 % Final    Lymphocytes 10/24/2023 15.00 (L)  22.00 - 41.00 % Final    Monocytes 10/24/2023 6.90  0.00 - 13.40 % Final    Eosinophils 10/24/2023 0.10  0.00 - 6.90 % Final    Basophils 10/24/2023 0.60  0.00 - 1.80 % Final    Immature Granulocytes 10/24/2023 0.40  0.00 - 0.90 % Final    Nucleated RBC 10/24/2023 0.00  0.00 - 0.20 /100 WBC Final    Please note new reference range effective 05/22/2023.    Neutrophils (Absolute) 10/24/2023 7.25  1.82 - 7.42 K/uL Final    Comment: Includes immature neutrophils, if present.  Please note new reference range effective 05/22/2023.      Lymphs (Absolute) 10/24/2023 1.41  1.00 - 4.80 K/uL Final    Monos (Absolute) 10/24/2023 0.65  0.00 - 0.85 K/uL Final    Eos (Absolute) 10/24/2023 0.01  0.00 - 0.51 K/uL Final    Baso (Absolute) 10/24/2023 0.06  0.00 - 0.12 K/uL Final    Immature Granulocytes (abs) 10/24/2023 0.04  0.00 - 0.11 K/uL Final    NRBC (Absolute) 10/24/2023 0.00  K/uL Final    Sodium 10/24/2023 135  135 - 145 mmol/L Final    Potassium 10/24/2023 3.6  3.6 - 5.5 mmol/L Final    Chloride 10/24/2023 99  96 - 112 mmol/L Final    Co2 10/24/2023 19 (L)  20 - 33 mmol/L Final    Anion Gap 10/24/2023 17.0 (H)  7.0 - 16.0 Final    Glucose 10/24/2023 143 (H)  65 - 99  mg/dL Final    Bun 10/24/2023 16  8 - 22 mg/dL Final    Creatinine 10/24/2023 0.97  0.50 - 1.40 mg/dL Final    Calcium 10/24/2023 10.6 (H)  8.5 - 10.5 mg/dL Final    Correct Calcium 10/24/2023 11.0 (H)  8.5 - 10.5 mg/dL Final    AST(SGOT) 10/24/2023 106 (H)  12 - 45 U/L Final    ALT(SGPT) 10/24/2023 58 (H)  2 - 50 U/L Final    Alkaline Phosphatase 10/24/2023 279 (H)  30 - 99 U/L Final    Total Bilirubin 10/24/2023 0.3  0.1 - 1.5 mg/dL Final    Albumin 10/24/2023 3.5  3.2 - 4.9 g/dL Final    Total Protein 10/24/2023 6.9  6.0 - 8.2 g/dL Final    Globulin 10/24/2023 3.4  1.9 - 3.5 g/dL Final    A-G Ratio 10/24/2023 1.0  g/dL Final    Diagnostic Alcohol 10/24/2023 <10.1  <10.1 mg/dL Final    Comment: For diagnostic purposes, results should always be assessed  in conjunction with the patient's medical history, clinical  examination, and other findings. This test is for medical  purposes only.      Amphetamines Urine 10/24/2023 Negative  Negative Final    Barbiturates 10/24/2023 Negative  Negative Final    Benzodiazepines 10/24/2023 Negative  Negative Final    Cocaine Metabolite 10/24/2023 Negative  Negative Final    Fentanyl, Urine 10/24/2023 Negative  Negative Final    Methadone 10/24/2023 Negative  Negative Final    Opiates 10/24/2023 Negative  Negative Final    Oxycodone 10/24/2023 Negative  Negative Final    Phencyclidine -Pcp 10/24/2023 Negative  Negative Final    Propoxyphene 10/24/2023 Negative  Negative Final    Cannabinoid Metab 10/24/2023 Negative  Negative Final    Comment: The above compounds were screened at the following thresholds:    Amphetamines               1000 ng/mL  Barbiturates                200 ng/mL  Benzodiazepines             200 ng/mL  Cocaine (Benzoylecgonine)   300 ng/mL  Fentanyl                      5 ng/mL  Methadone                   300 ng/mL  Opiates (Morphine)          300 ng/mL  Oxycodone                   100 ng/mL  Phencyclidine                25 ng/mL  Propoxyphene                 300 ng/mL  THC (Tetrahydrocannabinol)   50 ng/mL    This assay provides a preliminary unconfirmed analytical test  result that may be suitable for the clinical management of  patients in certain situations. A more specific alternative  chemical method must be used to obtain a confirmed analytical  result. Gas chromatography/mass spectrometry (GC/MS) is the  preferred confirmatory method. Clinical consideration and  professional judgment should be applied to any drug-of-abuse  test result, particularly when preliminary positive results  are used.        Color 10/24/2023 DK Yellow   Final    Character 10/24/2023 Clear   Final    Specific Gravity 10/24/2023 1.029  <1.035 Final    Ph 10/24/2023 5.0  5.0 - 8.0 Final    Glucose 10/24/2023 Negative  Negative mg/dL Final    Ketones 10/24/2023 80 (A)  Negative mg/dL Final    Protein 10/24/2023 100 (A)  Negative mg/dL Final    Bilirubin 10/24/2023 Negative  Negative Final    Urobilinogen, Urine 10/24/2023 0.2  Negative Final    Nitrite 10/24/2023 Negative  Negative Final    Leukocyte Esterase 10/24/2023 Negative  Negative Final    Occult Blood 10/24/2023 Negative  Negative Final    Micro Urine Req 10/24/2023 Microscopic   Final    Significant Indicator 10/24/2023 NEG   Final    Source 10/24/2023 UR   Final    Site 10/24/2023 -   Final    Culture Result 10/24/2023 No growth at 48 hours.   Final    Lactic Acid 10/24/2023 3.0 (H)  0.5 - 2.0 mmol/L Final    Influenza virus A RNA 10/24/2023 Negative  Negative Final    Influenza virus B, PCR 10/24/2023 Negative  Negative Final    RSV, PCR 10/24/2023 Negative  Negative Final    SARS-CoV-2 by PCR 10/24/2023 NotDetected   Final    Comment: [facility] providers: PLEASE REFER TO DE-ESCALATION AND RETESTING PROTOCOL  on [email or web address]    **The [employer] GeneXpert Xpress SARS-CoV-2 RT-PCR Test has been made  available for use under the Emergency Use Authorization (EUA) only.      SARS-CoV-2 Source 10/24/2023 NP Swab   Final    Procalcitonin  10/24/2023 0.31 (H)  <0.25 ng/mL Final    Comment: Initiation of empiric therapy (only if patient does not meet  CMS sepsis inclusion criteria):  LOWER RESPIRATORY TRACT INFECTION  ---------------------------------  <=0.25 ng/mL   Unlikely to be bacterial; antibiotics are  discouraged (may repeat in 6 hours if antibiotics  are withheld)    >0.25  ng/mL   Likely to be bacterial; antibiotics encouraged    De-escalation/Discontinuation of therapy:  LOWER RESPIRATORY TRACT INFECTION  ---------------------------------  <=0.25 ng/mL   Cessation of antibiotics is encouraged unless  patient is clinically unstable    Decrease from baseline by >=80%   Cessation of antibiotics is  encouraged unless patient is clinically unstable    Increasing or not decreasing from baseline AND >0.50 ng/mL  Consider possible treatment failure; expansion of  antibiotic coverage is encouraged    SEPSIS  ------  <=0.50 ng/mL   Cessation of antibiotics is encouraged unless  patient is clinically unstable    Decrease from baseline by >=80%   Cessation of antibiotics i                           s  encouraged unless patient is clinically unstable    Increasing or not decreasing from baseline AND >0.50 ng/mL  Consider possible treatment failure; expansion of  antibiotic coverage is encouraged        Lipase 10/24/2023 34  11 - 82 U/L Final    WBC 10/24/2023 2-5  /hpf Final    Comment: Female  <12 Yr 0-2  >12 Yr 0-5  Male   None      RBC 10/24/2023 0-2  /hpf Final    Comment: Female  >12 Yr 0-2  Male   None      Bacteria 10/24/2023 Negative  None /hpf Final    Epithelial Cells 10/24/2023 Few  /hpf Final    Epithelial Cells Renal 10/24/2023 Few  /hpf Final    Hyaline Cast 10/24/2023 6-10 (A)  /lpf Final    GFR (CKD-EPI) 10/24/2023 63  >60 mL/min/1.73 m 2 Final    Comment: Estimated Glomerular Filtration Rate is calculated using  race neutral CKD-EPI 2021 equation per NKF-ASN recommendations.      Lactic Acid 10/24/2023 1.3  0.5 - 2.0 mmol/L Final     Lactic Acid 10/25/2023 1.5  0.5 - 2.0 mmol/L Final    WBC 10/24/2023 10.1  4.8 - 10.8 K/uL Final    RBC 10/24/2023 4.42  4.20 - 5.40 M/uL Final    Hemoglobin 10/24/2023 12.2  12.0 - 16.0 g/dL Final    Hematocrit 10/24/2023 38.7  37.0 - 47.0 % Final    MCV 10/24/2023 87.6  81.4 - 97.8 fL Final    MCH 10/24/2023 27.6  27.0 - 33.0 pg Final    MCHC 10/24/2023 31.5 (L)  32.2 - 35.5 g/dL Final    Please note new reference range effective 05/22/2023.    RDW 10/24/2023 47.0  35.9 - 50.0 fL Final    Platelet Count 10/24/2023 306  164 - 446 K/uL Final    MPV 10/24/2023 10.5  9.0 - 12.9 fL Final    Neutrophils-Polys 10/24/2023 71.90  44.00 - 72.00 % Final    Lymphocytes 10/24/2023 17.20 (L)  22.00 - 41.00 % Final    Monocytes 10/24/2023 9.90  0.00 - 13.40 % Final    Eosinophils 10/24/2023 0.20  0.00 - 6.90 % Final    Basophils 10/24/2023 0.50  0.00 - 1.80 % Final    Immature Granulocytes 10/24/2023 0.30  0.00 - 0.90 % Final    Nucleated RBC 10/24/2023 0.00  0.00 - 0.20 /100 WBC Final    Please note new reference range effective 05/22/2023.    Neutrophils (Absolute) 10/24/2023 7.28  1.82 - 7.42 K/uL Final    Comment: Includes immature neutrophils, if present.  Please note new reference range effective 05/22/2023.      Lymphs (Absolute) 10/24/2023 1.74  1.00 - 4.80 K/uL Final    Monos (Absolute) 10/24/2023 1.00 (H)  0.00 - 0.85 K/uL Final    Eos (Absolute) 10/24/2023 0.02  0.00 - 0.51 K/uL Final    Baso (Absolute) 10/24/2023 0.05  0.00 - 0.12 K/uL Final    Immature Granulocytes (abs) 10/24/2023 0.03  0.00 - 0.11 K/uL Final    NRBC (Absolute) 10/24/2023 0.00  K/uL Final    Sodium 10/25/2023 139  135 - 145 mmol/L Final    Potassium 10/25/2023 3.9  3.6 - 5.5 mmol/L Final    Chloride 10/25/2023 107  96 - 112 mmol/L Final    Co2 10/25/2023 22  20 - 33 mmol/L Final    Glucose 10/25/2023 97  65 - 99 mg/dL Final    Bun 10/25/2023 14  8 - 22 mg/dL Final    Creatinine 10/25/2023 0.94  0.50 - 1.40 mg/dL Final    Calcium 10/25/2023 8.2 (L)   8.5 - 10.5 mg/dL Final    Anion Gap 10/25/2023 10.0  7.0 - 16.0 Final    WBC 10/25/2023 7.6  4.8 - 10.8 K/uL Final    RBC 10/25/2023 4.19 (L)  4.20 - 5.40 M/uL Final    Hemoglobin 10/25/2023 11.5 (L)  12.0 - 16.0 g/dL Final    Hematocrit 10/25/2023 36.8 (L)  37.0 - 47.0 % Final    MCV 10/25/2023 87.8  81.4 - 97.8 fL Final    MCH 10/25/2023 27.4  27.0 - 33.0 pg Final    MCHC 10/25/2023 31.3 (L)  32.2 - 35.5 g/dL Final    Please note new reference range effective 05/22/2023.    RDW 10/25/2023 47.3  35.9 - 50.0 fL Final    Platelet Count 10/25/2023 253  164 - 446 K/uL Final    MPV 10/25/2023 10.6  9.0 - 12.9 fL Final    Neutrophils-Polys 10/25/2023 64.30  44.00 - 72.00 % Final    Lymphocytes 10/25/2023 23.30  22.00 - 41.00 % Final    Monocytes 10/25/2023 10.80  0.00 - 13.40 % Final    Eosinophils 10/25/2023 0.70  0.00 - 6.90 % Final    Basophils 10/25/2023 0.50  0.00 - 1.80 % Final    Immature Granulocytes 10/25/2023 0.40  0.00 - 0.90 % Final    Nucleated RBC 10/25/2023 0.00  0.00 - 0.20 /100 WBC Final    Please note new reference range effective 05/22/2023.    Neutrophils (Absolute) 10/25/2023 4.88  1.82 - 7.42 K/uL Final    Comment: Includes immature neutrophils, if present.  Please note new reference range effective 05/22/2023.      Lymphs (Absolute) 10/25/2023 1.77  1.00 - 4.80 K/uL Final    Monos (Absolute) 10/25/2023 0.82  0.00 - 0.85 K/uL Final    Eos (Absolute) 10/25/2023 0.05  0.00 - 0.51 K/uL Final    Baso (Absolute) 10/25/2023 0.04  0.00 - 0.12 K/uL Final    Immature Granulocytes (abs) 10/25/2023 0.03  0.00 - 0.11 K/uL Final    NRBC (Absolute) 10/25/2023 0.00  K/uL Final    Lactic Acid 10/25/2023 1.2  0.5 - 2.0 mmol/L Final    GFR (CKD-EPI) 10/25/2023 65  >60 mL/min/1.73 m 2 Final    Comment: Estimated Glomerular Filtration Rate is calculated using  race neutral CKD-EPI 2021 equation per NKF-ASN recommendations.      WBC 10/26/2023 9.6  4.8 - 10.8 K/uL Final    RBC 10/26/2023 4.64  4.20 - 5.40 M/uL Final     Hemoglobin 10/26/2023 12.8  12.0 - 16.0 g/dL Final    Hematocrit 10/26/2023 41.4  37.0 - 47.0 % Final    MCV 10/26/2023 89.2  81.4 - 97.8 fL Final    MCH 10/26/2023 27.6  27.0 - 33.0 pg Final    MCHC 10/26/2023 30.9 (L)  32.2 - 35.5 g/dL Final    Please note new reference range effective 05/22/2023.    RDW 10/26/2023 47.8  35.9 - 50.0 fL Final    Platelet Count 10/26/2023 308  164 - 446 K/uL Final    MPV 10/26/2023 10.3  9.0 - 12.9 fL Final    Sodium 10/26/2023 136  135 - 145 mmol/L Final    Potassium 10/26/2023 3.8  3.6 - 5.5 mmol/L Final    Chloride 10/26/2023 102  96 - 112 mmol/L Final    Co2 10/26/2023 23  20 - 33 mmol/L Final    Anion Gap 10/26/2023 11.0  7.0 - 16.0 Final    Glucose 10/26/2023 112 (H)  65 - 99 mg/dL Final    Bun 10/26/2023 10  8 - 22 mg/dL Final    Creatinine 10/26/2023 1.00  0.50 - 1.40 mg/dL Final    Calcium 10/26/2023 8.5  8.5 - 10.5 mg/dL Final    Correct Calcium 10/26/2023 8.8  8.5 - 10.5 mg/dL Final    AST(SGOT) 10/26/2023 202 (H)  12 - 45 U/L Final    ALT(SGPT) 10/26/2023 81 (H)  2 - 50 U/L Final    Alkaline Phosphatase 10/26/2023 249 (H)  30 - 99 U/L Final    Total Bilirubin 10/26/2023 0.4  0.1 - 1.5 mg/dL Final    Albumin 10/26/2023 3.6  3.2 - 4.9 g/dL Final    Total Protein 10/26/2023 6.9  6.0 - 8.2 g/dL Final    Globulin 10/26/2023 3.3  1.9 - 3.5 g/dL Final    A-G Ratio 10/26/2023 1.1  g/dL Final    TSH 10/26/2023 2.060  0.380 - 5.330 uIU/mL Final    Comment: The 2011 American Thyroid Association (SANDEEP) guidelines  recommended that the interpretation of thyroid function in  pregnancy be based on trimester specific reference ranges.    1st Trimester  0.100-2.500 mIU/L  2nd Trimester  0.200-3.000 mIU/L  3rd Trimester  0.300-3.500 mIU/L    These established reference ranges have not been validated  at [facility].      GFR (CKD-EPI) 10/26/2023 60  >60 mL/min/1.73 m 2 Final    Comment: Estimated Glomerular Filtration Rate is calculated using  race neutral CKD-EPI 2021  equation per NKF-ASN recommendations.         Imaging:     All listed images below have been independently reviewed by me. I agree with the findings as summarized below:    MRI brain 10/25/23:    IMPRESSION:     1.  No evidence of intracranial metastatic disease  2.  Supratentorial white matter changes in the pattern moderately suspicious for hypertensive encephalopathy. Demyelinating disease might have a similar appearance.  3.  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  Postsurgical changes in the RIGHT globe with an indistinctly visualized posterior chamber mass without definite enhancement. This is smaller than on the prior study. Recommend correlation with history. Better anatomic assessment might be performed   with orbital MRI if clinically appropriate.    CT-ABDOMEN-PELVIS WITH    Result Date: 10/24/2023  10/24/2023 10:18 AM HISTORY/REASON FOR EXAM:  epigastric abdominal pain. TECHNIQUE/EXAM DESCRIPTION:   CT scan of the abdomen and pelvis with contrast. Contrast-enhanced helical scanning was obtained from the diaphragmatic domes through the pubic symphysis following the bolus administration of nonionic contrast without complication. 100 mL of Omnipaque 350 nonionic contrast was administered without complication. Low dose optimization technique was utilized for this CT exam including automated exposure control and adjustment of the mA and/or kV according to patient size. COMPARISON: No prior studies available. FINDINGS: Lower Chest: Cluster of small nodules in the LEFT lung base measuring up to 5 mm.  RIGHT middle lobe nodule measuring 6 mm. Bilateral lower lobe nodules measuring up to 5 mm. Liver: Heterogeneous liver with multiple low-density lesions, with expansion of the LEFT lobe, most likely metastases. Spleen: Unremarkable. Pancreas: Unremarkable. Gallbladder: No calcified stones. Biliary: Nondilated. Adrenal glands: Normal. Kidneys: Small RIGHT  kidney cyst which no further evaluation is necessary.  LEFT kidney is unremarkable. Bowel: No evidence of bowel obstruction.  Normal appendix. Lymph nodes: No adenopathy. Vasculature: Moderate atherosclerotic calcification of the common aorta with bulky calcification of the bifurcation. Peritoneum: No peritoneal fluid or pneumoperitoneum. Musculoskeletal: Lumbar spine degenerative changes.  Lytic lesion within L2 vertebral body. Pelvis: Bladder is decompressed.  Uterus is grossly unremarkable.  LEFT adnexal cystic structure measuring 18 mm.  RIGHT adnexal cystic structure measuring 34 mm.     1.  Multiple ill-defined low-density liver lesions with expansion of the LEFT lobe, most likely metastases. 2.  Multiple small bibasilar pulmonary nodules concerning for metastasis as well. 3.  No focal mesenteric inflammatory process. 4.  Bilateral adnexal cystic lesions, larger on the RIGHT, serous inclusion cyst versus neoplasm. 5.  L2 lytic lesion concerning for metastasis.     CT-HEAD W/O    Result Date: 10/24/2023  10/24/2023 10:18 AM HISTORY/REASON FOR EXAM:  Seizure. Found down. TECHNIQUE/EXAM DESCRIPTION AND NUMBER OF VIEWS: CT of the head without contrast. The study was performed on a helical multidetector CT scanner. Contiguous 2.5 mm axial sections were obtained from the skull base through the vertex. Up to date radiation dose reduction adjustments have been utilized to meet ALARA standards for radiation dose reduction. COMPARISON:  None available FINDINGS: Lateral ventricles are normal in size and symmetric. Cortical sulci are mildly prominent. Patchy areas of low attenuation in the white matter bilaterally. No significant mass effect or midline shift. Basal cisterns are patent. No evidence for intracranial hemorrhage. Calvaria are intact. Multiple metallic densities in the RIGHT orbit consistent with prior ocular surgery. Visualized mastoid air cells are clear. Sphenoid sinus mucosal thickening and bubbly  secretions.     1.  Mild atrophy and white matter changes. 2.  No acute intracranial hemorrhage or territorial infarct. 3.  Sphenoid sinusitis. 4.  Apparent postoperative change of RIGHT ocular globe with multiple metallic densities present.     DX-CHEST-PORTABLE (1 VIEW)    Result Date: 10/24/2023  10/24/2023 10:09 AM HISTORY/REASON FOR EXAM:  Found unresponsive.. TECHNIQUE/EXAM DESCRIPTION AND NUMBER OF VIEWS: Single portable view of the chest. COMPARISON: None FINDINGS: The soft tissues and bony structures are unremarkable. The heart and mediastinal structures are within normal limits. Pulmonary vascularity is normal. The lung fields are clear. There is no effusion or pneumothorax.     No acute cardiopulmonary disease evident.      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 Tumor was large, so measurements were difficult. Tumor measured as 20 mm transverse by 21 mm longitudinal with thickness 11.9 mm. Posterior edge overhangs the posterior pole and seems to bisect the fovea and nerve. The ciliary body was involved for the clock hours of the tumor superiorly, the interior markers should delineate the clock hours of the tumor and ciliary body to include.    Assessment & Plan:  1. 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 She was unfortunately lost to follow up until she presented to the hospital with seizures. She has widespread metastatic disease now.     Current Diagnosis and Staging: Metastatic ciliochoroidal melanoma, cTxNxM1 stage IV disease    Update: Patient will consider therapy with Tebentafusp, which had updated 3 year efficacy and safety data presented last week at [facility]. The patient is interested in therapy but needs to determine goals of care with her  family. She will discuss this and let us know whether she would like to proceed with treatment, or go on hospice care.     Treatment Plan: Potential for Tebentafusp     Treatment Citation: [id number] trial, EMEKA Corley et al. [facility] 2023    Plan of Care:    Primary Therapy: Consideration for Tebentafusp if patient elects to move forward with palliative systemic therapy  Supportive Therapy: Consider hospice referral as well.   Toxicity: Patient is getting antineoplastic therapy and needs monitoring of blood counts, hepatic function, and renal function due to potential for organ dysfunction.   Labs: CBC with diff, CMP monitoring  Imaging: Needs outpatient PET if she moves forward with therapy.   Treatment Planning: Patient has metastatic disease, and would be a candidate for Tebentafusp (Kimmtrak) if she elects to move forward with systemic therapy.   Consultations: Neurology  Code Status: Full  Miscellaneous: NA  Return for Follow Up: PRN (patient to let us know if she decides to proceed with therapy)    Any questions and concerns raised by the patient were answered to the best of my ability. Thank you for allowing me to participate in the care for this patient. Please feel free to contact me for any questions or concerns.       Total time spent on chart review, clinic encounter, and documentation: 31 minutes.

## 2023-11-10 NOTE — TELEPHONE ENCOUNTER
Patient call asking for a refill on Oxycodone (immediate release) 10 mg to her pharmacy. Per patient she is out of medication and she is in a lot of pain. Thank you

## 2023-11-10 NOTE — TELEPHONE ENCOUNTER
Patient left a voicemail requesting a refill of her pain medication.  Patient also stated that she has decided on no additional treatment and no immunotherapy. Lastly, patient stated her urine is orange in color. Patient states this can wait until next week to discuss, but she would like a pain medication refill.

## 2023-11-12 NOTE — ED PROVIDER NOTES
ED Provider Note    CHIEF COMPLAINT  Chief Complaint   Patient presents with    Abdominal Pain     LLQ pain progressively worsening. Pt has uveal melanoma with mets to the stomach. Pt reports worsening nausea.vomiting and pain at home.        EXTERNAL RECORDS REVIEWED  Outpatient Notes telephone notes with oncologist patient requesting refill of opiate pain medications    HPI/ROS  LIMITATION TO HISTORY   Select: : None  OUTSIDE HISTORIAN(S):  Significant other     Malaika Garcia is a 68 y.o. female who presents 68-year-old female with a history of melanoma with diffuse metastasis and many gastric ulcers presents to the ED for intractable pain.  Patient recently seen by PCP and prescribed oxycodone's however was only given a short course.  She reports she only has 2 left and does not think she will make it through the weekend.  Patient currently undergoing eval for hospice as she has been given a 6-month prognosis.    PAST MEDICAL HISTORY   has a past medical history of Arthritis, RLS (restless legs syndrome), Seizure (HCC) (08/2013), and Thyroid disease.    SURGICAL HISTORY   has a past surgical history that includes thyroidectomy (1994); debridement (Left); colonoscopy,diagnostic (N/A, 10/27/2023); upper gi endoscopy,diagnosis (N/A, 10/27/2023); upper gi endoscopy,biopsy (N/A, 10/27/2023); colonoscopy,biopsy (N/A, 10/27/2023); and colonoscopy with polyp (N/A, 10/27/2023).    FAMILY HISTORY  Family History   Problem Relation Age of Onset    Stroke Mother     Cancer Mother         Throat cancer    Hypertension Mother     Cancer Maternal Aunt         Breast cancer    Stroke Maternal Grandmother     Hypertension Maternal Grandmother     Cancer Paternal Grandfather         Colon cancer       SOCIAL HISTORY  Social History     Tobacco Use    Smoking status: Former     Current packs/day: 0.00     Average packs/day: 1 pack/day for 20.0 years (20.0 ttl pk-yrs)     Types: Cigarettes     Start date: 11/11/2000     Quit  "date: 11/11/2020     Years since quitting: 3.0    Smokeless tobacco: Never    Tobacco comments:     quit 1.5 years ago   Vaping Use    Vaping Use: Never used   Substance and Sexual Activity    Alcohol use: Not Currently     Alcohol/week: 1.8 oz     Types: 3 Standard drinks or equivalent per week     Comment: occasional wine or cocktail    Drug use: Not Currently     Types: Marijuana     Comment: for pain    Sexual activity: Yes     Partners: Male       CURRENT MEDICATIONS  Home Medications       Reviewed by Michelle Solorzano R.N. (Registered Nurse) on 11/11/23 at 1620  Med List Status: Not Addressed     Medication Last Dose Status   Acetaminophen 500 MG Cap  Active   hydrocortisone (CORTENEMA) 100 MG/60ML Enema  Active   omeprazole (PRILOSEC) 40 MG delayed-release capsule  Active   ondansetron (ZOFRAN ODT) 4 MG TABLET DISPERSIBLE  Active   oxyCODONE immediate release (ROXICODONE) 10 MG immediate release tablet  Active                    ALLERGIES  Allergies   Allergen Reactions    Sulfa Drugs Hives     Other reaction(s): HIVES    Cephalexin      Other reaction(s): HIVES  Other reaction(s): HIVES      Keflex Unspecified     Cannot remember    Sulfamethoxazole W-Trimethoprim Hives       PHYSICAL EXAM  VITAL SIGNS: /60   Pulse 88   Temp 36.3 °C (97.4 °F) (Temporal)   Resp 18   Ht 1.676 m (5' 6\")   Wt 81.6 kg (180 lb)   SpO2 91%   BMI 29.05 kg/m²    Physical Exam  Vitals and nursing note reviewed.   Constitutional:       Appearance: She is well-developed.   HENT:      Head: Normocephalic.   Eyes:      Extraocular Movements: Extraocular movements intact.      Pupils: Pupils are equal, round, and reactive to light.   Cardiovascular:      Rate and Rhythm: Normal rate and regular rhythm.   Pulmonary:      Effort: Pulmonary effort is normal.      Breath sounds: Normal breath sounds.   Abdominal:      Palpations: Abdomen is soft.      Tenderness: There is abdominal tenderness in the epigastric area. "   Musculoskeletal:      Cervical back: Normal range of motion.   Neurological:      Mental Status: She is alert and oriented to person, place, and time.       DIAGNOSTIC STUDIES / PROCEDURES      COURSE & MEDICAL DECISION MAKING    ED Observation Status? No; Patient does not meet criteria for ED Observation.     INITIAL ASSESSMENT, COURSE AND PLAN  Care Narrative: 68-year-old female with metastatic melanoma presents with intractable pain and is almost out of short course of opiates prescribed to her as an outpatient.  Denies new changes to her symptoms reports primarily just the pain caused her to present.  Exam is overall benign and her vital signs are within normal limits.  I was able to get the patient's pain under control here and agreed to prescribe her a short course of opiate medications.  Her hand  will attempt to get in touch with doctors on Monday as she is currently being evaluated for hospice.  They are confident she will be able to get more medications at that time.  The patient was counseled on the addictive nature of these medications and how to appropriately use them.  Return precautions were given for intractable pain or other concerns            ADDITIONAL PROBLEM LIST  Past Medical History:   Diagnosis Date    Arthritis     Possibly Rh- rheumatoid arthritis, unsure    RLS (restless legs syndrome)     Seizure (HCC) 08/2013    Thyroid disease        DISPOSITION AND DISCUSSIONS  I have discussed management of the patient with the following physicians and YIN's:      Discussion of management with other [facility] or appropriate source(s): Case Management discussed prescription issue patient to determine if she would prefer to fill prescriptions ordered by me or by oncologist tomorrow morning.    Escalation of care considered, and ultimately not performed:acute inpatient care management, however at this time, the patient is most appropriate for outpatient management    Barriers to care at this time,  including but not limited to: .     Decision tools and prescription drugs considered including, but not limited to: Pain Medications oxycodone .    FINAL DIAGNOSIS  1. Epigastric pain Acute   2. Chronic gastric ulcer without hemorrhage and without perforation Acute   3. Metastatic melanoma (HCC) Acute          Electronically signed by: Daquan Nunn M.D., 11/11/2023 4:27 PM

## 2023-11-12 NOTE — DISCHARGE PLANNING
COURTNEY received a call from patient-Madisonfranky Garcia asking for help getting [facility] pharmacy on [address] [address] [facility] to release the prescription for oxycodone.  CM reviewed the patients chart and noted she has a prescription available for 120 tablets at [facility] that was prescribed by her oncologist and is available for  on 11.10.2023.  COURTNEY spoke with Dr. Nunn -VALE to address if he wanted to cancel the prescription at the [facility] so they can  the prescription he wrote to [facility].  Per Dr. Nunn, he will gladly cancel the prescription at [facility] but this might create an issue in the future.   Dr. Nunn suggest I speak with Madison and see how she would like to proceed.  COURTNEY did speak with her  and he stated he was not aware the prescription was available for  at [facility] and he will get this prescription in the am.  He stated she still has two pills left for tonight;.  He appreciated the update.  CM updated Dr. Nunn to the above.

## 2023-11-12 NOTE — ED TRIAGE NOTES
Chief Complaint   Patient presents with    Abdominal Pain     LLQ pain progressively worsening. Pt has uveal melanoma with mets to the stomach. Pt reports worsening nausea.vomiting and pain at home.      Pt BIB EMS from home for above complaint. Pt received 100 mcg fentanyl prior to arrival.

## 2023-11-12 NOTE — ED NOTES
Pt provided discharge instructions and follow-up information. Pt verbalized understanding and all questions answered. PIV removed. Pt assisted from ED in WC

## 2023-11-20 NOTE — PROGRESS NOTES
Patient is having nausea/vomiting with morphine, despite Zofran. Will trial Reglan, as is drug of choice for opioid-induced N/V.

## 2023-11-21 NOTE — CASE COMMUNICATION
Spoke with patient this AM regarding CNA appointment. Patient stated she was not feeling great today and did not want a shower. Patient requested a call later in the week.

## 2024-01-11 ENCOUNTER — DOCUMENTATION (OUTPATIENT)
Dept: HEALTH INFORMATION MANAGEMENT | Facility: OTHER | Age: 69
End: 2024-01-11
Payer: MEDICARE

## (undated) DEVICE — FILM CASSETTE ENDO

## (undated) DEVICE — PORT AUXILLARY WATER (50EA/BX)

## (undated) DEVICE — CANISTER SUCTION RIGID RED 1500CC (40EA/CA)

## (undated) DEVICE — BUTTON ENDOSCOPY DISPOSABLE

## (undated) DEVICE — TUBE CONNECTING SUCTION - CLEAR PLASTIC STERILE 72 IN (50EA/CA)

## (undated) DEVICE — MASK WITH FACE SHIELD (25/BX 4BX/CA)

## (undated) DEVICE — BITE BLOCK, DISP.

## (undated) DEVICE — SENSOR OXIMETER ADULT SPO2 RD SET (20EA/BX)

## (undated) DEVICE — MASK PANORAMIC OXYGEN PRO2 (30EA/CA)

## (undated) DEVICE — TRAP POLYP E-TRAP (25EA/BX)

## (undated) DEVICE — FORCEP RADIAL JAW 4 STANDARD CAPACITY W/NEEDLE 240CM (40EA/BX)

## (undated) DEVICE — WATER IRRIGATION STERILE 1000ML (12EA/CA)

## (undated) DEVICE — KIT CUSTOM PROCEDURE SINGLE FOR ENDO  (15/CA)

## (undated) DEVICE — SET LEADWIRE 5 LEAD BEDSIDE DISPOSABLE ECG (1SET OF 5/EA)

## (undated) DEVICE — CAPTIVATOR II-15MM ROUND STIFF  (40/BX)

## (undated) DEVICE — ELECTRODE 850 FOAM ADHESIVE - HYDROGEL RADIOTRNSPRNT (50/PK)

## (undated) DEVICE — CONTAINER, SPECIMEN, STERILE

## (undated) DEVICE — NEPTUNE 4 PORT MANIFOLD - (20/PK)

## (undated) DEVICE — KIT PROCEDURE DOUBLE ENDO ONLY (5/CA)

## (undated) DEVICE — TOWEL STOP TIMEOUT SAFETY FLAG (40EA/CA)